# Patient Record
Sex: MALE | Race: BLACK OR AFRICAN AMERICAN | NOT HISPANIC OR LATINO | Employment: OTHER | URBAN - METROPOLITAN AREA
[De-identification: names, ages, dates, MRNs, and addresses within clinical notes are randomized per-mention and may not be internally consistent; named-entity substitution may affect disease eponyms.]

---

## 2023-09-28 ENCOUNTER — TELEPHONE (OUTPATIENT)
Dept: FAMILY MEDICINE CLINIC | Facility: CLINIC | Age: 83
End: 2023-09-28

## 2023-09-28 ENCOUNTER — IN HOME VISIT (OUTPATIENT)
Dept: FAMILY MEDICINE CLINIC | Facility: CLINIC | Age: 83
End: 2023-09-28
Payer: MEDICARE

## 2023-09-28 VITALS
RESPIRATION RATE: 16 BRPM | SYSTOLIC BLOOD PRESSURE: 120 MMHG | DIASTOLIC BLOOD PRESSURE: 60 MMHG | TEMPERATURE: 98.2 F | HEART RATE: 74 BPM | OXYGEN SATURATION: 96 %

## 2023-09-28 DIAGNOSIS — H40.9 GLAUCOMA OF BOTH EYES, UNSPECIFIED GLAUCOMA TYPE: ICD-10-CM

## 2023-09-28 DIAGNOSIS — E11.9 TYPE 2 DIABETES MELLITUS WITHOUT COMPLICATION, WITHOUT LONG-TERM CURRENT USE OF INSULIN (HCC): ICD-10-CM

## 2023-09-28 DIAGNOSIS — K21.9 GASTROESOPHAGEAL REFLUX DISEASE, UNSPECIFIED WHETHER ESOPHAGITIS PRESENT: ICD-10-CM

## 2023-09-28 DIAGNOSIS — J44.9 CHRONIC OBSTRUCTIVE PULMONARY DISEASE, UNSPECIFIED COPD TYPE (HCC): ICD-10-CM

## 2023-09-28 DIAGNOSIS — G47.00 INSOMNIA, UNSPECIFIED TYPE: ICD-10-CM

## 2023-09-28 DIAGNOSIS — K59.00 CONSTIPATION, UNSPECIFIED CONSTIPATION TYPE: ICD-10-CM

## 2023-09-28 DIAGNOSIS — G20.A1 PD (PARKINSON'S DISEASE): ICD-10-CM

## 2023-09-28 DIAGNOSIS — Z00.01 ENCOUNTER FOR GENERAL ADULT MEDICAL EXAMINATION WITH ABNORMAL FINDINGS: Primary | ICD-10-CM

## 2023-09-28 DIAGNOSIS — B36.9 FUNGAL RASH OF TORSO: ICD-10-CM

## 2023-09-28 DIAGNOSIS — F03.C0 SEVERE DEMENTIA, UNSPECIFIED DEMENTIA TYPE, UNSPECIFIED WHETHER BEHAVIORAL, PSYCHOTIC, OR MOOD DISTURBANCE OR ANXIETY (HCC): ICD-10-CM

## 2023-09-28 DIAGNOSIS — N40.0 BENIGN PROSTATIC HYPERPLASIA, UNSPECIFIED WHETHER LOWER URINARY TRACT SYMPTOMS PRESENT: ICD-10-CM

## 2023-09-28 DIAGNOSIS — L89.219 PRESSURE INJURY OF SKIN OF RIGHT HIP, UNSPECIFIED INJURY STAGE: ICD-10-CM

## 2023-09-28 DIAGNOSIS — Z85.46 HISTORY OF PROSTATE CANCER: ICD-10-CM

## 2023-09-28 DIAGNOSIS — H04.123 DRY EYES: ICD-10-CM

## 2023-09-28 DIAGNOSIS — I10 HYPERTENSION, UNSPECIFIED TYPE: ICD-10-CM

## 2023-09-28 DIAGNOSIS — N39.0 URINARY TRACT INFECTION ASSOCIATED WITH CATHETERIZATION OF URINARY TRACT, UNSPECIFIED INDWELLING URINARY CATHETER TYPE, INITIAL ENCOUNTER: ICD-10-CM

## 2023-09-28 DIAGNOSIS — I82.622 ARM DVT (DEEP VENOUS THROMBOEMBOLISM), ACUTE, LEFT (HCC): ICD-10-CM

## 2023-09-28 DIAGNOSIS — Z90.12 H/O LEFT MASTECTOMY: ICD-10-CM

## 2023-09-28 DIAGNOSIS — L98.429 SKIN ULCER OF SACRUM, UNSPECIFIED ULCER STAGE (HCC): ICD-10-CM

## 2023-09-28 DIAGNOSIS — Z74.01 BEDBOUND: ICD-10-CM

## 2023-09-28 DIAGNOSIS — T83.511A URINARY TRACT INFECTION ASSOCIATED WITH CATHETERIZATION OF URINARY TRACT, UNSPECIFIED INDWELLING URINARY CATHETER TYPE, INITIAL ENCOUNTER: ICD-10-CM

## 2023-09-28 DIAGNOSIS — R13.10 DYSPHAGIA, UNSPECIFIED TYPE: ICD-10-CM

## 2023-09-28 DIAGNOSIS — Z93.1 GASTROSTOMY TUBE DEPENDENT (HCC): ICD-10-CM

## 2023-09-28 DIAGNOSIS — Z97.8 INDWELLING FOLEY CATHETER PRESENT: ICD-10-CM

## 2023-09-28 DIAGNOSIS — N18.9 CHRONIC KIDNEY DISEASE, UNSPECIFIED CKD STAGE: ICD-10-CM

## 2023-09-28 DIAGNOSIS — F32.A DEPRESSION, UNSPECIFIED DEPRESSION TYPE: ICD-10-CM

## 2023-09-28 PROCEDURE — G0438 PPPS, INITIAL VISIT: HCPCS | Performed by: NURSE PRACTITIONER

## 2023-09-28 RX ORDER — ASCORBIC ACID 250 MG
250 TABLET ORAL DAILY
Qty: 30 TABLET | Refills: 3 | Status: ON HOLD
Start: 2023-09-28 | End: 2023-10-28

## 2023-09-28 RX ORDER — AMLODIPINE BESYLATE 10 MG/1
10 TABLET ORAL DAILY
Qty: 30 TABLET | Refills: 3 | Status: ON HOLD
Start: 2023-09-28 | End: 2023-10-28

## 2023-09-28 RX ORDER — NYSTATIN 100000 [USP'U]/G
POWDER TOPICAL 3 TIMES DAILY
Qty: 60 G | Refills: 3 | Status: ON HOLD
Start: 2023-09-28 | End: 2023-10-28

## 2023-09-28 RX ORDER — POLYVINYL ALCOHOL 14 MG/ML
1 SOLUTION/ DROPS OPHTHALMIC AS NEEDED
Qty: 30 ML | Refills: 3 | Status: ON HOLD
Start: 2023-09-28 | End: 2023-10-28

## 2023-09-28 RX ORDER — TAMSULOSIN HYDROCHLORIDE 0.4 MG/1
0.4 CAPSULE ORAL
Qty: 30 CAPSULE | Refills: 3 | Status: ON HOLD
Start: 2023-09-28 | End: 2023-10-28

## 2023-09-28 RX ORDER — FOLIC ACID 1 MG/1
1 TABLET ORAL DAILY
Qty: 30 TABLET | Refills: 3 | Status: ON HOLD
Start: 2023-09-28 | End: 2024-01-26

## 2023-09-28 RX ORDER — FAMOTIDINE 20 MG/1
20 TABLET, FILM COATED ORAL 2 TIMES DAILY
Qty: 60 TABLET | Refills: 3 | Status: ON HOLD
Start: 2023-09-28 | End: 2023-10-28

## 2023-09-28 RX ORDER — IPRATROPIUM BROMIDE AND ALBUTEROL SULFATE 2.5; .5 MG/3ML; MG/3ML
3 SOLUTION RESPIRATORY (INHALATION) 4 TIMES DAILY
Qty: 360 ML | Refills: 3 | Status: ON HOLD
Start: 2023-09-28 | End: 2023-10-28

## 2023-09-28 RX ORDER — LAMOTRIGINE 25 MG/1
25 TABLET ORAL 2 TIMES DAILY
Qty: 60 TABLET | Refills: 3 | Status: ON HOLD
Start: 2023-09-28 | End: 2023-10-28

## 2023-09-28 RX ORDER — SERTRALINE HYDROCHLORIDE 100 MG/1
100 TABLET, FILM COATED ORAL DAILY
Qty: 30 TABLET | Refills: 5 | Status: ON HOLD
Start: 2023-09-28

## 2023-09-28 RX ORDER — BUDESONIDE 0.25 MG/2ML
0.25 INHALANT ORAL 2 TIMES DAILY
Qty: 120 ML | Refills: 3 | Status: ON HOLD
Start: 2023-09-28 | End: 2023-10-28

## 2023-09-28 RX ORDER — CARBIDOPA AND LEVODOPA 25; 100 MG/1; MG/1
1 TABLET, ORALLY DISINTEGRATING ORAL 3 TIMES DAILY
Qty: 90 TABLET | Refills: 3 | Status: ON HOLD
Start: 2023-09-28 | End: 2023-10-28

## 2023-09-28 RX ORDER — LATANOPROST 50 UG/ML
1 SOLUTION/ DROPS OPHTHALMIC
Qty: 1.5 ML | Refills: 3 | Status: ON HOLD
Start: 2023-09-28 | End: 2023-10-28

## 2023-09-28 NOTE — TELEPHONE ENCOUNTER
vm on clinical line:     Kurtis my name is [place]. I am calling from the [facility] of [address] regarding a referral for Georgie Lover YOB: 1940. I am calling to confirm if Tuesday Remembers will be signing for his home care orders. If you could please give me a call back, my number is [phone]. Thank you. Called back & confirmed seen today. Will sign for home orders.

## 2023-09-28 NOTE — PROGRESS NOTES
Assessment and Plan:     Problem List Items Addressed This Visit        Digestive    Dysphagia    Gastroesophageal reflux disease    Relevant Medications    famotidine (PEPCID) 20 mg tablet       Endocrine    Type 2 diabetes mellitus without complication  , without long-term current use of insulin ([facility])  Will order blood testing supplies for daughter       Respiratory    Chronic obstructive pulmonary disease ([facility])    Relevant Medications    budesonide (Pulmicort) 0.25 mg/2 mL nebulizer solution    ipratropium-albuterol (DUO-NEB) 0.5-2.5 mg/3 mL nebulizer solution       Cardiovascular and Mediastinum    Hypertension    Relevant Medications    amLODIPine (NORVASC) 10 mg tablet    Arm DVT (deep venous thromboembolism), acute, left ([facility])  No blood thinners ordered for patient on arrival from [place]-report from daughter  No swelling of limb       Nervous and Auditory    PD (Parkinson's disease)    Relevant Medications    carbidopa-levodopa (PARCOPA)  mg per disintegrating tablet    lamoTRIgine (LaMICtal) 25 mg tabletl    Severe dementia ([facility])    Relevant Medications    lamoTRIgine (LaMICtal) 25 mg tablet    sertraline (ZOLOFT) 100 mg tablet       Musculoskeletal and Integument    Skin ulcer of sacrum ([facility])    Relevant Medications    ascorbic acid (VITAMIN C) 564 MG tablet    folic acid ( Folic Acid) 1 mg tablet    nystatin (MYCOSTATIN) powder    Fungal rash of torso    Relevant Medications    nystatin (MYCOSTATIN) powder       Genitourinary    Catheter-associated urinary tract infection     Chronic kidney disease    Benign prostatic hyperplasia    Relevant Medications    tamsulosin (FLOMAX) 0.4 mg       Other    Encounter for general adult medical examination with abnormal findings - Primary    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[address])    Bedbound    Pressure injury of skin of right hip    H/O left mastectomy    History of prostate cancer    Indwelling Lieberman catheter present    Constipation    Relevant Medications docusate (COLACE) 50 mg/5 mL liquid    magnesium hydroxide (MILK OF MAGNESIA) 400 mg/5 mL oral suspension    Glaucoma of both eyes    Relevant Medications    latanoprost (XALATAN) 0.005 % ophthalmic solution    polyvinyl alcohol (LIQUIFILM TEARS) 1.4 % ophthalmic solution    Insomnia    Relevant Medications    Melatonin 5 MG/ML LIQD    Dry eyes    Relevant Medications    polyvinyl alcohol (LIQUIFILM TEARS) 1.4 % ophthalmic solution    Depression    Relevant Medications    sertraline (ZOLOFT) 100 mg tablet     BMI Counseling: There is no height or weight on file to calculate BMI. Follow-up plan was not completed due to elderly patient (72 years old) where weight reduction/weight gain would complicate underlying health condition such as: mental illness, dementia, or confusion. Bed bound    Depression Screening and Follow-up Plan: Patient not screened for depression due to medical reason (urgent/emergent situation, decreased capacity). Tobacco Cessation Counseling: Tobacco use screening or tobacco cessation counseling was not performed due to other medical reasons. Patient has dementia      Preventive health issues were discussed with patient, and age appropriate screening tests were ordered as noted in patient's After Visit Summary. Personalized health advice and appropriate referrals for health education or preventive services given if needed, as noted in patient's After Visit Summary. History of Present Illness:     Patient presents for a Medicare Wellness Visit    Patient seen today at his daughter's house. Arrived last night from [facility] where he had been living for over 8 years. He was in a hospital in [facility] and several nursing homes. He currently has a UTI. He has a vazquez catheter and gastrostomy tube in which he receives meds, food and fluids. He had a left masectomy in July with no further follow up. He has a history of prostate Ca.   Per his daughter he has a left arm DVT but reacted poorly to blood thinners. There is no swelling of his left arm. He is a DNR, DNI. He has dx of UTI, PD, HTN, dementia, CKD, DM, dysphagia, and bed bound. He has right hip and sacral wounds. > 75 minutes was spent with Nilsa Holden on detailed history-reviewing all paperwork his daughter had and talking to his daughter-physical exam and assessment, planning and diagnosing and education for his daughter on tube feeding. Patient Care Team:  Tuesday ROSITA Anderson as PCP - General (Nurse Practitioner)     Review of Systems:     Review of Systems   Unable to perform ROS: Dementia      Problem List:     Patient Active Problem List   Diagnosis   • Type 2 diabetes mellitus without complication, without long-term current use of insulin ([address])   • Encounter for general adult medical examination with abnormal findings   • Catheter-associated urinary tract infection    • PD (Parkinson's disease)   • Hypertension   • Chronic kidney disease   • Severe dementia ([address])   • Dysphagia   • 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[address])   • Bedbound   • Pressure injury of skin of right hip   • Skin ulcer of sacrum ([address])   • H/O left mastectomy   • History of prostate cancer   • Arm DVT (deep venous thromboembolism), acute, left ([facility])   • Indwelling Lieberman catheter present   • Chronic obstructive pulmonary disease ([facility])   • Constipation   • Gastroesophageal reflux disease   • Glaucoma of both eyes   • Insomnia   • Fungal rash of torso   • Dry eyes   • Depression   • Benign prostatic hyperplasia      Past Medical and Surgical History:     No past medical history on file. No past surgical history on file. Family History:     No family history on file.    Social History:     Social History     Socioeconomic History   • Marital status:      Spouse name: Not on file   • Number of children: Not on file   • Years of education: Not on file   • Highest education level: Not on file   Occupational History   • Not on file   Tobacco Use   • Smoking status: Not on file   • Smokeless tobacco: Not on file   Substance and Sexual Activity   • Alcohol use: Not on file   • Drug use: Not on file   • Sexual activity: Not on file   Other Topics Concern   • Not on file   Social History Narrative   • Not on file     Social Determinants of Health     Financial Resource Strain: Unknown (9/28/2023)    Overall Financial Resource Strain (CARDIA)    • Difficulty of Paying Living Expenses: Patient refused   Food Insecurity: Not on file   Transportation Needs: Not on file   Physical Activity: Not on file   Stress: Not on file   Social Connections: Not on file   Intimate Partner Violence: Not on file   Housing Stability: Not on file      Medications and Allergies:     Current Outpatient Medications   Medication Sig Dispense Refill   • amLODIPine (NORVASC) 10 mg tablet Take 1 tablet (10 mg total) by mouth daily 30 tablet 3   • ascorbic acid (VITAMIN C) 250 MG tablet Take 1 tablet (250 mg total) by mouth daily 30 tablet 3   • budesonide (Pulmicort) 0.25 mg/2 mL nebulizer solution Take 2 mL (0.25 mg total) by nebulization 2 (two) times a day Rinse mouth after use.  120 mL 3   • carbidopa-levodopa (PARCOPA)  mg per disintegrating tablet Take 1 tablet by mouth 3 (three) times a day 90 tablet 3   • docusate (COLACE) 50 mg/5 mL liquid Take 10 mL (100 mg total) by mouth daily 300 mL 3   • famotidine (PEPCID) 20 mg tablet Take 1 tablet (20 mg total) by mouth 2 (two) times a day 60 tablet 3   • folic acid (KP Folic Acid) 1 mg tablet Take 1 tablet (1 mg total) by mouth daily 30 tablet 3   • ipratropium-albuterol (DUO-NEB) 0.5-2.5 mg/3 mL nebulizer solution Take 3 mL by nebulization 4 (four) times a day 360 mL 3   • lamoTRIgine (LaMICtal) 25 mg tablet Take 1 tablet (25 mg total) by mouth 2 (two) times a day 60 tablet 3   • latanoprost (XALATAN) 0.005 % ophthalmic solution Administer 1 drop to both eyes daily at bedtime 1.5 mL 3   • magnesium hydroxide (MILK OF MAGNESIA) 400 mg/5 mL oral suspension Take 30 mL by mouth daily as needed (every 3rd day) 300 mL 3   • Melatonin 5 MG/ML LIQD Take 2 mL (10 mg total) by mouth daily at bedtime 60 mL 3   • nystatin (MYCOSTATIN) powder Apply topically 3 (three) times a day 60 g 3   • polyvinyl alcohol (LIQUIFILM TEARS) 1.4 % ophthalmic solution Administer 1 drop to both eyes as needed for dry eyes 30 mL 3   • sertraline (ZOLOFT) 100 mg tablet Take 1 tablet (100 mg total) by mouth daily 30 tablet 5   • tamsulosin (FLOMAX) 0.4 mg Take 1 capsule (0.4 mg total) by mouth daily with dinner 30 capsule 3     No current facility-administered medications for this visit. Not on File   Immunizations: There is no immunization history on file for this patient. Health Maintenance: There are no preventive care reminders to display for this patient. Topic Date Due   • Influenza Vaccine (1) 09/01/2023      Medicare Screening Tests and Risk Assessments:     Castillo Rodriguez is here for his Initial Wellness visit. Historian  Patient cannot answer questions due to cognitive impairment, intelluctual disability, or expressive limitations. Information provided by: family and caregiver. Health Risk Assessment:   Patient rates overall health as fair. Patient feels that their physical health rating is slightly worse. Pain experienced in the last 7 days has been none. Patient states that he has experienced weight loss or gain in last 6 months. Fall Risk Screening:    In the past year, patient has experienced: no history of falling in past year      Home Safety:  Bed bound    Nutrition:   Tube feeding-Diabetisource 1.2 250ml 2 containers 3 times a day    Medications:   Medications via peg    Activities of Daily Living (ADLs)/Instrumental Activities of Daily Living (IADLs):       ADL comments: Bed bound and complete care    Previous Hospitalizations:   Any hospitalizations or ED visits within the last 12 months?: Yes      Advance Care Planning:   Living will: Yes    Advanced directive: Yes    Provider agrees with end of life decisions: Yes      Comments: Daughter Pao CID  Patient DNR/DNI    Cognitive Screening:   Provider or family/friend/caregiver concerned regarding cognition?: No    PREVENTIVE SCREENINGS      Cardiovascular Screening:    General: Patient Declines      Diabetes Screening:       Due for: Blood Glucose      Colorectal Cancer Screening:     General: Patient Declines      Prostate Cancer Screening:    General: Screening Not Indicated      Osteoporosis Screening:    General: Patient Declines      Abdominal Aortic Aneurysm (AAA) Screening:        General: Patient Declines      Lung Cancer Screening:     General: Screening Not Indicated      Hepatitis C Screening:      Hep C Screening Accepted: Yes      Screening, Brief Intervention, and Referral to Treatment (SBIRT)    Screening  Typical number of drinks in a day: 0  Typical number of drinks in a week: 0  Interpretation: Low risk drinking behavior. No results found. Physical Exam:     /60   Pulse 74   Temp 98.2 °F (36.8 °C)   Resp 16   SpO2 96%     Physical Exam  Constitutional:       General: He is not in acute distress. Appearance: He is normal weight. He is not ill-appearing, toxic-appearing or diaphoretic. HENT:      Head: Normocephalic and atraumatic. Right Ear: External ear normal.      Left Ear: External ear normal.      Nose: Nose normal. No congestion. Mouth/Throat:      Mouth: Mucous membranes are moist.   Eyes:      General:         Right eye: No discharge. Left eye: No discharge. Conjunctiva/sclera: Conjunctivae normal.   Cardiovascular:      Rate and Rhythm: Normal rate and regular rhythm. Pulses: Normal pulses. Heart sounds: Normal heart sounds. Pulmonary:      Effort: Pulmonary effort is normal. No respiratory distress. Breath sounds: Normal breath sounds. No wheezing, rhonchi or rales. Abdominal:      General: Abdomen is flat.  Bowel sounds are normal. Palpations: Abdomen is soft. Tenderness: There is no abdominal tenderness. There is no guarding. Comments: Gastrostomy tube left abdomen   Genitourinary:     Penis: Normal.       Comments: Indwelling vazquez catheter  Musculoskeletal:      Cervical back: Normal range of motion. No rigidity. Right lower leg: No edema. Left lower leg: No edema. Comments: Stiffness of limbs and joints   Skin:     General: Skin is warm and dry. Findings: Lesion present. No bruising or rash. Comments: Wounds of right hip and sacrum   Neurological:      Mental Status: He is alert. He is disoriented.       Coordination: Coordination abnormal.      Gait: Gait abnormal.      Comments: dementia   Psychiatric:      Comments: dementia          ROSITA Monae

## 2023-09-29 ENCOUNTER — PATIENT OUTREACH (OUTPATIENT)
Dept: FAMILY MEDICINE CLINIC | Facility: CLINIC | Age: 83
End: 2023-09-29

## 2023-09-29 ENCOUNTER — HOSPITAL ENCOUNTER (INPATIENT)
Facility: HOSPITAL | Age: 83
LOS: 10 days | Discharge: HOME HEALTH | DRG: 668 | End: 2023-10-09
Attending: EMERGENCY MEDICINE | Admitting: INTERNAL MEDICINE
Payer: MEDICARE

## 2023-09-29 ENCOUNTER — TELEPHONE (OUTPATIENT)
Dept: FAMILY MEDICINE CLINIC | Facility: CLINIC | Age: 83
End: 2023-09-29

## 2023-09-29 ENCOUNTER — APPOINTMENT (EMERGENCY)
Dept: RADIOLOGY | Facility: HOSPITAL | Age: 83
DRG: 668 | End: 2023-09-29
Payer: MEDICARE

## 2023-09-29 DIAGNOSIS — K21.9 GASTROESOPHAGEAL REFLUX DISEASE, UNSPECIFIED WHETHER ESOPHAGITIS PRESENT: ICD-10-CM

## 2023-09-29 DIAGNOSIS — G20.A1 PARKINSON'S DISEASE WITHOUT DYSKINESIA, UNSPECIFIED WHETHER MANIFESTATIONS FLUCTUATE: ICD-10-CM

## 2023-09-29 DIAGNOSIS — N40.0 BENIGN PROSTATIC HYPERPLASIA, UNSPECIFIED WHETHER LOWER URINARY TRACT SYMPTOMS PRESENT: ICD-10-CM

## 2023-09-29 DIAGNOSIS — R13.10 DYSPHAGIA, UNSPECIFIED TYPE: ICD-10-CM

## 2023-09-29 DIAGNOSIS — R31.9 HEMATURIA: Primary | ICD-10-CM

## 2023-09-29 DIAGNOSIS — I10 HYPERTENSION, UNSPECIFIED TYPE: ICD-10-CM

## 2023-09-29 DIAGNOSIS — R31.9 HEMATURIA SYNDROME: ICD-10-CM

## 2023-09-29 DIAGNOSIS — R31.0 GROSS HEMATURIA: ICD-10-CM

## 2023-09-29 DIAGNOSIS — K59.00 CONSTIPATION, UNSPECIFIED CONSTIPATION TYPE: ICD-10-CM

## 2023-09-29 DIAGNOSIS — F32.A DEPRESSION, UNSPECIFIED DEPRESSION TYPE: ICD-10-CM

## 2023-09-29 DIAGNOSIS — Z93.1 GASTROSTOMY TUBE DEPENDENT (HCC): ICD-10-CM

## 2023-09-29 DIAGNOSIS — J44.9 CHRONIC OBSTRUCTIVE PULMONARY DISEASE, UNSPECIFIED COPD TYPE (HCC): ICD-10-CM

## 2023-09-29 DIAGNOSIS — D64.9 ANEMIA, UNSPECIFIED TYPE: ICD-10-CM

## 2023-09-29 DIAGNOSIS — G47.00 INSOMNIA, UNSPECIFIED TYPE: ICD-10-CM

## 2023-09-29 DIAGNOSIS — C67.2 CARCINOMA OF LATERAL WALL OF URINARY BLADDER (HCC): ICD-10-CM

## 2023-09-29 DIAGNOSIS — N18.31 STAGE 3A CHRONIC KIDNEY DISEASE (HCC): ICD-10-CM

## 2023-09-29 LAB
ALBUMIN SERPL BCP-MCNC: 3 G/DL (ref 3.5–5)
ALP SERPL-CCNC: 46 U/L (ref 34–104)
ALT SERPL W P-5'-P-CCNC: 10 U/L (ref 7–52)
ANION GAP SERPL CALCULATED.3IONS-SCNC: 7 MMOL/L
APTT PPP: 35 SECONDS (ref 23–37)
AST SERPL W P-5'-P-CCNC: 21 U/L (ref 13–39)
BACTERIA UR QL AUTO: ABNORMAL /HPF
BACTERIA UR QL AUTO: ABNORMAL /HPF
BASOPHILS # BLD AUTO: 0.08 THOUSANDS/ÂΜL (ref 0–0.1)
BASOPHILS NFR BLD AUTO: 1 % (ref 0–1)
BILIRUB SERPL-MCNC: 0.41 MG/DL (ref 0.2–1)
BILIRUB UR QL STRIP: ABNORMAL
BILIRUB UR QL STRIP: ABNORMAL
BUN SERPL-MCNC: 58 MG/DL (ref 5–25)
CALCIUM ALBUM COR SERPL-MCNC: 10 MG/DL (ref 8.3–10.1)
CALCIUM SERPL-MCNC: 9.2 MG/DL (ref 8.4–10.2)
CHLORIDE SERPL-SCNC: 102 MMOL/L (ref 96–108)
CLARITY UR: ABNORMAL
CLARITY UR: ABNORMAL
CO2 SERPL-SCNC: 26 MMOL/L (ref 21–32)
COLOR UR: ABNORMAL
COLOR UR: ABNORMAL
CREAT SERPL-MCNC: 2.35 MG/DL (ref 0.6–1.3)
EOSINOPHIL # BLD AUTO: 0.36 THOUSAND/ÂΜL (ref 0–0.61)
EOSINOPHIL NFR BLD AUTO: 4 % (ref 0–6)
ERYTHROCYTE [DISTWIDTH] IN BLOOD BY AUTOMATED COUNT: 16.4 % (ref 11.6–15.1)
GFR SERPL CREATININE-BSD FRML MDRD: 24 ML/MIN/1.73SQ M
GLUCOSE SERPL-MCNC: 132 MG/DL (ref 65–140)
GLUCOSE SERPL-MCNC: 134 MG/DL (ref 65–140)
GLUCOSE SERPL-MCNC: 169 MG/DL (ref 65–140)
GLUCOSE UR STRIP-MCNC: ABNORMAL MG/DL
GLUCOSE UR STRIP-MCNC: NEGATIVE MG/DL
HCT VFR BLD AUTO: 32.7 % (ref 36.5–49.3)
HGB BLD-MCNC: 10.4 G/DL (ref 12–17)
HGB UR QL STRIP.AUTO: ABNORMAL
HGB UR QL STRIP.AUTO: ABNORMAL
IMM GRANULOCYTES # BLD AUTO: 0.02 THOUSAND/UL (ref 0–0.2)
IMM GRANULOCYTES NFR BLD AUTO: 0 % (ref 0–2)
INR PPP: 1.13 (ref 0.84–1.19)
KETONES UR STRIP-MCNC: ABNORMAL MG/DL
KETONES UR STRIP-MCNC: ABNORMAL MG/DL
LEUKOCYTE ESTERASE UR QL STRIP: ABNORMAL
LEUKOCYTE ESTERASE UR QL STRIP: ABNORMAL
LYMPHOCYTES # BLD AUTO: 1.7 THOUSANDS/ÂΜL (ref 0.6–4.47)
LYMPHOCYTES NFR BLD AUTO: 19 % (ref 14–44)
MAGNESIUM SERPL-MCNC: 2 MG/DL (ref 1.9–2.7)
MCH RBC QN AUTO: 27.7 PG (ref 26.8–34.3)
MCHC RBC AUTO-ENTMCNC: 31.8 G/DL (ref 31.4–37.4)
MCV RBC AUTO: 87 FL (ref 82–98)
MONOCYTES # BLD AUTO: 0.72 THOUSAND/ÂΜL (ref 0.17–1.22)
MONOCYTES NFR BLD AUTO: 8 % (ref 4–12)
NEUTROPHILS # BLD AUTO: 6.19 THOUSANDS/ÂΜL (ref 1.85–7.62)
NEUTS SEG NFR BLD AUTO: 68 % (ref 43–75)
NITRITE UR QL STRIP: ABNORMAL
NITRITE UR QL STRIP: ABNORMAL
NON-SQ EPI CELLS URNS QL MICRO: ABNORMAL /HPF
NON-SQ EPI CELLS URNS QL MICRO: ABNORMAL /HPF
NRBC BLD AUTO-RTO: 0 /100 WBCS
PH UR STRIP.AUTO: 6.5 [PH]
PH UR STRIP.AUTO: ABNORMAL [PH]
PLATELET # BLD AUTO: 239 THOUSANDS/UL (ref 149–390)
PMV BLD AUTO: 9 FL (ref 8.9–12.7)
POTASSIUM SERPL-SCNC: 4.6 MMOL/L (ref 3.5–5.3)
PROT SERPL-MCNC: 7.3 G/DL (ref 6.4–8.4)
PROT UR STRIP-MCNC: ABNORMAL MG/DL
PROT UR STRIP-MCNC: ABNORMAL MG/DL
PROTHROMBIN TIME: 14.7 SECONDS (ref 11.6–14.5)
RBC # BLD AUTO: 3.75 MILLION/UL (ref 3.88–5.62)
RBC #/AREA URNS AUTO: ABNORMAL /HPF
RBC #/AREA URNS AUTO: ABNORMAL /HPF
SODIUM SERPL-SCNC: 135 MMOL/L (ref 135–147)
SP GR UR STRIP.AUTO: 1.01 (ref 1–1.03)
SP GR UR STRIP.AUTO: 1.01 (ref 1–1.03)
UROBILINOGEN UR QL STRIP.AUTO: 0.2 E.U./DL
UROBILINOGEN UR QL STRIP.AUTO: ABNORMAL E.U./DL
WBC # BLD AUTO: 9.07 THOUSAND/UL (ref 4.31–10.16)
WBC #/AREA URNS AUTO: ABNORMAL /HPF
WBC #/AREA URNS AUTO: ABNORMAL /HPF

## 2023-09-29 PROCEDURE — 87077 CULTURE AEROBIC IDENTIFY: CPT | Performed by: EMERGENCY MEDICINE

## 2023-09-29 PROCEDURE — 85730 THROMBOPLASTIN TIME PARTIAL: CPT | Performed by: EMERGENCY MEDICINE

## 2023-09-29 PROCEDURE — 94640 AIRWAY INHALATION TREATMENT: CPT

## 2023-09-29 PROCEDURE — 87186 SC STD MICRODIL/AGAR DIL: CPT | Performed by: EMERGENCY MEDICINE

## 2023-09-29 PROCEDURE — 94760 N-INVAS EAR/PLS OXIMETRY 1: CPT

## 2023-09-29 PROCEDURE — 83036 HEMOGLOBIN GLYCOSYLATED A1C: CPT

## 2023-09-29 PROCEDURE — 87086 URINE CULTURE/COLONY COUNT: CPT | Performed by: EMERGENCY MEDICINE

## 2023-09-29 PROCEDURE — 85025 COMPLETE CBC W/AUTO DIFF WBC: CPT | Performed by: EMERGENCY MEDICINE

## 2023-09-29 PROCEDURE — 99285 EMERGENCY DEPT VISIT HI MDM: CPT

## 2023-09-29 PROCEDURE — 83735 ASSAY OF MAGNESIUM: CPT | Performed by: EMERGENCY MEDICINE

## 2023-09-29 PROCEDURE — 85610 PROTHROMBIN TIME: CPT | Performed by: EMERGENCY MEDICINE

## 2023-09-29 PROCEDURE — 81001 URINALYSIS AUTO W/SCOPE: CPT | Performed by: EMERGENCY MEDICINE

## 2023-09-29 PROCEDURE — 82948 REAGENT STRIP/BLOOD GLUCOSE: CPT

## 2023-09-29 PROCEDURE — 80053 COMPREHEN METABOLIC PANEL: CPT | Performed by: EMERGENCY MEDICINE

## 2023-09-29 PROCEDURE — 36415 COLL VENOUS BLD VENIPUNCTURE: CPT | Performed by: EMERGENCY MEDICINE

## 2023-09-29 PROCEDURE — 99285 EMERGENCY DEPT VISIT HI MDM: CPT | Performed by: EMERGENCY MEDICINE

## 2023-09-29 PROCEDURE — 99223 1ST HOSP IP/OBS HIGH 75: CPT | Performed by: INTERNAL MEDICINE

## 2023-09-29 PROCEDURE — G1004 CDSM NDSC: HCPCS

## 2023-09-29 PROCEDURE — 74176 CT ABD & PELVIS W/O CONTRAST: CPT

## 2023-09-29 RX ORDER — SERTRALINE HYDROCHLORIDE 100 MG/1
100 TABLET, FILM COATED ORAL DAILY
Status: DISCONTINUED | OUTPATIENT
Start: 2023-09-30 | End: 2023-10-09 | Stop reason: HOSPADM

## 2023-09-29 RX ORDER — FAMOTIDINE 20 MG/1
20 TABLET, FILM COATED ORAL EVERY OTHER DAY
Status: DISCONTINUED | OUTPATIENT
Start: 2023-09-30 | End: 2023-10-09 | Stop reason: HOSPADM

## 2023-09-29 RX ORDER — LANOLIN ALCOHOL/MO/W.PET/CERES
6 CREAM (GRAM) TOPICAL
Status: DISCONTINUED | OUTPATIENT
Start: 2023-09-29 | End: 2023-10-09 | Stop reason: HOSPADM

## 2023-09-29 RX ORDER — SERTRALINE HYDROCHLORIDE 100 MG/1
100 TABLET, FILM COATED ORAL DAILY
Status: DISCONTINUED | OUTPATIENT
Start: 2023-09-30 | End: 2023-09-29

## 2023-09-29 RX ORDER — IPRATROPIUM BROMIDE AND ALBUTEROL SULFATE 2.5; .5 MG/3ML; MG/3ML
3 SOLUTION RESPIRATORY (INHALATION) 4 TIMES DAILY
Status: DISCONTINUED | OUTPATIENT
Start: 2023-09-29 | End: 2023-09-30

## 2023-09-29 RX ORDER — MULTIVIT WITH MINERALS/LUTEIN
250 TABLET ORAL DAILY
Status: DISCONTINUED | OUTPATIENT
Start: 2023-09-30 | End: 2023-09-29

## 2023-09-29 RX ORDER — NYSTATIN 100000 [USP'U]/G
POWDER TOPICAL 3 TIMES DAILY
Status: DISCONTINUED | OUTPATIENT
Start: 2023-09-29 | End: 2023-10-09 | Stop reason: HOSPADM

## 2023-09-29 RX ORDER — LATANOPROST 50 UG/ML
1 SOLUTION/ DROPS OPHTHALMIC
Status: DISCONTINUED | OUTPATIENT
Start: 2023-09-29 | End: 2023-10-09 | Stop reason: HOSPADM

## 2023-09-29 RX ORDER — LABETALOL 100 MG/1
200 TABLET, FILM COATED ORAL EVERY 8 HOURS SCHEDULED
Status: DISCONTINUED | OUTPATIENT
Start: 2023-09-29 | End: 2023-09-29

## 2023-09-29 RX ORDER — FOLIC ACID 1 MG/1
1 TABLET ORAL DAILY
Status: DISCONTINUED | OUTPATIENT
Start: 2023-09-30 | End: 2023-10-09 | Stop reason: HOSPADM

## 2023-09-29 RX ORDER — TAMSULOSIN HYDROCHLORIDE 0.4 MG/1
0.4 CAPSULE ORAL
Status: DISCONTINUED | OUTPATIENT
Start: 2023-09-30 | End: 2023-09-30

## 2023-09-29 RX ORDER — AMLODIPINE BESYLATE 10 MG/1
10 TABLET ORAL DAILY
Status: DISCONTINUED | OUTPATIENT
Start: 2023-09-30 | End: 2023-09-29

## 2023-09-29 RX ORDER — LABETALOL 100 MG/1
200 TABLET, FILM COATED ORAL EVERY 8 HOURS SCHEDULED
Status: DISCONTINUED | OUTPATIENT
Start: 2023-09-30 | End: 2023-10-06

## 2023-09-29 RX ORDER — FOLIC ACID 1 MG/1
1 TABLET ORAL DAILY
Status: DISCONTINUED | OUTPATIENT
Start: 2023-09-30 | End: 2023-09-29

## 2023-09-29 RX ORDER — LAMOTRIGINE 25 MG/1
25 TABLET ORAL 2 TIMES DAILY
Status: DISCONTINUED | OUTPATIENT
Start: 2023-09-30 | End: 2023-10-09 | Stop reason: HOSPADM

## 2023-09-29 RX ORDER — BUDESONIDE 0.25 MG/2ML
0.25 INHALANT ORAL 2 TIMES DAILY
Status: DISCONTINUED | OUTPATIENT
Start: 2023-09-29 | End: 2023-10-09 | Stop reason: HOSPADM

## 2023-09-29 RX ORDER — FAMOTIDINE 20 MG/1
20 TABLET, FILM COATED ORAL EVERY OTHER DAY
Status: DISCONTINUED | OUTPATIENT
Start: 2023-09-30 | End: 2023-09-29

## 2023-09-29 RX ORDER — LAMOTRIGINE 25 MG/1
25 TABLET ORAL 2 TIMES DAILY
Status: DISCONTINUED | OUTPATIENT
Start: 2023-09-29 | End: 2023-09-29

## 2023-09-29 RX ORDER — MULTIVIT WITH MINERALS/LUTEIN
250 TABLET ORAL DAILY
Status: DISCONTINUED | OUTPATIENT
Start: 2023-09-30 | End: 2023-10-09 | Stop reason: HOSPADM

## 2023-09-29 RX ORDER — DOCUSATE SODIUM 100 MG/1
100 CAPSULE, LIQUID FILLED ORAL DAILY
Status: DISCONTINUED | OUTPATIENT
Start: 2023-09-30 | End: 2023-09-30

## 2023-09-29 RX ORDER — AMLODIPINE BESYLATE 10 MG/1
10 TABLET ORAL DAILY
Status: DISCONTINUED | OUTPATIENT
Start: 2023-09-30 | End: 2023-10-06

## 2023-09-29 RX ORDER — CEFTRIAXONE 1 G/50ML
1000 INJECTION, SOLUTION INTRAVENOUS ONCE
Status: COMPLETED | OUTPATIENT
Start: 2023-09-29 | End: 2023-09-29

## 2023-09-29 RX ORDER — CEFTRIAXONE 1 G/50ML
1000 INJECTION, SOLUTION INTRAVENOUS EVERY 24 HOURS
Status: DISCONTINUED | OUTPATIENT
Start: 2023-09-30 | End: 2023-10-01

## 2023-09-29 RX ORDER — MINERAL OIL AND PETROLATUM 150; 830 MG/G; MG/G
OINTMENT OPHTHALMIC EVERY 4 HOURS PRN
Status: DISCONTINUED | OUTPATIENT
Start: 2023-09-29 | End: 2023-10-09 | Stop reason: HOSPADM

## 2023-09-29 RX ADMIN — Medication 6 MG: at 23:08

## 2023-09-29 RX ADMIN — IPRATROPIUM BROMIDE AND ALBUTEROL SULFATE 3 ML: .5; 2.5 SOLUTION RESPIRATORY (INHALATION) at 22:45

## 2023-09-29 RX ADMIN — CEFTRIAXONE 1000 MG: 1 INJECTION, SOLUTION INTRAVENOUS at 20:29

## 2023-09-29 RX ADMIN — LATANOPROST 1 DROP: 50 SOLUTION OPHTHALMIC at 23:08

## 2023-09-29 RX ADMIN — BUDESONIDE 0.25 MG: 0.25 INHALANT ORAL at 22:48

## 2023-09-29 RX ADMIN — NYSTATIN: 100000 POWDER TOPICAL at 23:08

## 2023-09-29 NOTE — TELEPHONE ENCOUNTER
Tuesday    Could you please see if you can go to the house, Mr. Tarun Tucker has blood in the cathere the daughter Herrera Spencer  [phone] cell    Please asap. please thanks

## 2023-09-29 NOTE — TELEPHONE ENCOUNTER
Allyn vang    Tuesday will like me to forward this call. See if you can help her. Please    "Tuesday"  " Could you please see if you can go to the hose.  Mr. Samantha García has blood in the cathere the daughnorma Olguin called this morning an now."    [phone] cell

## 2023-09-29 NOTE — PROGRESS NOTES
Received in basket message to reach out to patients daughter. Spoke with Mele Bradford from clerical staff. Attempted outreach to patients daughter. Left detailed message for daughter requesting return call. RN CM contact number provided. Daughter returned RN CM phone call. Per daughter [facility] placed order for [facility]. [facility] ordered all DME. Daughter has been following up with [facility]. Pt had blood in his cathter. Dorita from [facility] came to do home visit. Dorita recommended that patient be sent to ED for evaluation. Pt was enroute to ED at time of call.

## 2023-09-29 NOTE — ED PROVIDER NOTES
History  Chief Complaint   Patient presents with   • Blood in Urine     Pt brought in by squad reportexdly from home. Per squad, pt was recently discharged from nursing home and has developed hematuria from vazquez catheter. Upon arrival, pt noted with dark red urine from catheter. Pt denies any pain at this time. 77-year-old male who lives at home presents with malfunction of the Vazquez catheter where he is draining blood. He has a chronic Vazquez for urine retention/UTIs. History of Parkinson's dementia mostly bedbound recently discharged from another hospital for evaluation of sacral ulcers. No reports of fevers chills abdominal pain nausea vomiting or any other symptoms. History provided by:  Patient and relative   used: No        Prior to Admission Medications   Prescriptions Last Dose Informant Patient Reported? Taking? Blood Glucose Monitoring Suppl Supplies MISC Unknown  No No   Sig: Use every morning Check FBG daily   Melatonin 5 MG/ML LIQD 9/28/2023 at 2100  No Yes   Sig: Take 2 mL (10 mg total) by mouth daily at bedtime   amLODIPine (NORVASC) 10 mg tablet 9/29/2023 at 0900  No Yes   Sig: Take 1 tablet (10 mg total) by mouth daily   ascorbic acid (VITAMIN C) 250 MG tablet More than a month  No No   Sig: Take 1 tablet (250 mg total) by mouth daily   Patient not taking: Reported on 9/29/2023   budesonide (Pulmicort) 0.25 mg/2 mL nebulizer solution More than a month  No No   Sig: Take 2 mL (0.25 mg total) by nebulization 2 (two) times a day Rinse mouth after use.    carbidopa-levodopa (PARCOPA)  mg per disintegrating tablet 9/29/2023 at 0900  No Yes   Sig: Take 1 tablet by mouth 3 (three) times a day   docusate (COLACE) 50 mg/5 mL liquid More than a month  No No   Sig: Take 10 mL (100 mg total) by mouth daily   famotidine (PEPCID) 20 mg tablet 9/29/2023 at 0900  No Yes   Sig: Take 1 tablet (20 mg total) by mouth 2 (two) times a day   folic acid (KP Folic Acid) 1 mg tablet 9/29/2023 at 0900  No Yes   Sig: Take 1 tablet (1 mg total) by mouth daily   ipratropium-albuterol (DUO-NEB) 0.5-2.5 mg/3 mL nebulizer solution More than a month  No No   Sig: Take 3 mL by nebulization 4 (four) times a day   lamoTRIgine (LaMICtal) 25 mg tablet 9/29/2023 at 0900  No Yes   Sig: Take 1 tablet (25 mg total) by mouth 2 (two) times a day   latanoprost (XALATAN) 0.005 % ophthalmic solution 9/29/2023 at 0900  No Yes   Sig: Administer 1 drop to both eyes daily at bedtime   magnesium hydroxide (MILK OF MAGNESIA) 400 mg/5 mL oral suspension More than a month  No No   Sig: Take 30 mL by mouth daily as needed (every 3rd day)   nystatin (MYCOSTATIN) powder More than a month  No No   Sig: Apply topically 3 (three) times a day   polyvinyl alcohol (LIQUIFILM TEARS) 1.4 % ophthalmic solution More than a month  No No   Sig: Administer 1 drop to both eyes as needed for dry eyes   sertraline (ZOLOFT) 100 mg tablet 9/29/2023 at 0900  No Yes   Sig: Take 1 tablet (100 mg total) by mouth daily   tamsulosin (FLOMAX) 0.4 mg 9/28/2023 at 1600  No Yes   Sig: Take 1 capsule (0.4 mg total) by mouth daily with dinner      Facility-Administered Medications: None       Past Medical History:   Diagnosis Date   • BPH (benign prostatic hyperplasia)    • Chronic kidney disease    • COPD (chronic obstructive pulmonary disease) ([facility])    • Dementia ([facility])    • Depression    • Diabetes ([address])    • DVT (deep venous thrombosis) ([facility])    • Dysphagia    • Gastrointestinal tube present ([facility])    • GERD (gastroesophageal reflux disease)    • Glaucoma    • HTN (hypertension)    • Indwelling Lieberman catheter present    • Parkinsons ([address])    • Prostate cancer ([address])        Past Surgical History:   Procedure Laterality Date   • IVC FILTER INSERTION      Per patient's Daughter/Caregiver Pao during inpatient admission   • MASTECTOMY Left        History reviewed. No pertinent family history.   I have reviewed and agree with the history as documented. E-Cigarette/Vaping   • E-Cigarette Use Never User      E-Cigarette/Vaping Substances     Social History     Tobacco Use   • Smoking status: Never   • Smokeless tobacco: Never   Vaping Use   • Vaping Use: Never used   Substance Use Topics   • Alcohol use: Never   • Drug use: Never       Review of Systems   Constitutional: Negative. HENT: Negative. Eyes: Negative. Respiratory: Negative. Cardiovascular: Negative. Gastrointestinal: Negative. Endocrine: Negative. Genitourinary: Positive for hematuria. Musculoskeletal: Negative. Skin: Negative. Allergic/Immunologic: Negative. Neurological: Negative. Hematological: Negative. Psychiatric/Behavioral: Negative. All other systems reviewed and are negative. Physical Exam  Physical Exam  Vitals and nursing note reviewed. Constitutional:       Appearance: Normal appearance. HENT:      Head: Normocephalic and atraumatic. Nose: Nose normal.      Mouth/Throat:      Mouth: Mucous membranes are moist.   Eyes:      Extraocular Movements: Extraocular movements intact. Pupils: Pupils are equal, round, and reactive to light. Cardiovascular:      Rate and Rhythm: Normal rate and regular rhythm. Pulmonary:      Effort: Pulmonary effort is normal.      Breath sounds: Normal breath sounds. Abdominal:      General: Abdomen is flat. Bowel sounds are normal.      Palpations: Abdomen is soft. Musculoskeletal:         General: Normal range of motion. Cervical back: Normal range of motion and neck supple. Skin:     General: Skin is warm. Capillary Refill: Capillary refill takes less than 2 seconds. Neurological:      General: No focal deficit present. Mental Status: He is alert and oriented to person, place, and time. Mental status is at baseline. Psychiatric:         Mood and Affect: Mood normal.         Thought Content:  Thought content normal.         Vital Signs  ED Triage Vitals [09/29/23 1557]   Temperature Pulse Respirations Blood Pressure SpO2   98 °F (36.7 °C) 71 20 114/59 95 %      Temp Source Heart Rate Source Patient Position - Orthostatic VS BP Location FiO2 (%)   Tympanic Monitor Lying Right arm --      Pain Score       No Pain           Vitals:    09/29/23 2030 09/29/23 2100 09/29/23 2204 09/29/23 2248   BP: (!) 146/102 150/67 147/77    Pulse: 88 88 95 79   Patient Position - Orthostatic VS: Lying Lying           Visual Acuity      ED Medications  Medications   budesonide (PULMICORT) inhalation solution 0.25 mg (0.25 mg Nebulization Given 9/29/23 2248)   docusate sodium (COLACE) capsule 100 mg (has no administration in time range)   ipratropium-albuterol (DUO-NEB) 0.5-2.5 mg/3 mL inhalation solution 3 mL (3 mL Nebulization Given 9/29/23 2245)   latanoprost (XALATAN) 0.005 % ophthalmic solution 1 drop (1 drop Both Eyes Given 9/29/23 2308)   melatonin tablet 6 mg (6 mg Per G Tube Given 9/29/23 2308)   nystatin (MYCOSTATIN) powder ( Topical Given 9/29/23 2308)   artificial tear (LUBRIFRESH P.M.) ophthalmic ointment (has no administration in time range)   tamsulosin (FLOMAX) capsule 0.4 mg (has no administration in time range)   amLODIPine (NORVASC) tablet 10 mg (has no administration in time range)   labetalol (NORMODYNE) tablet 200 mg (has no administration in time range)   sertraline (ZOLOFT) tablet 100 mg (has no administration in time range)   famotidine (PEPCID) tablet 20 mg (has no administration in time range)   magnesium hydroxide (MILK OF MAGNESIA) oral suspension 30 mL (has no administration in time range)   folic acid (FOLVITE) tablet 1 mg (has no administration in time range)   carbidopa-levodopa (SINEMET)  mg per tablet 1 tablet (has no administration in time range)   lamoTRIgine (LaMICtal) tablet 25 mg (has no administration in time range)   ascorbic acid (VITAMIN C) tablet 250 mg (has no administration in time range)   cefTRIAXone (ROCEPHIN) IVPB (premix in dextrose) 1,000 mg 50 mL (has no administration in time range)   cefTRIAXone (ROCEPHIN) IVPB (premix in dextrose) 1,000 mg 50 mL (0 mg Intravenous Stopped 9/29/23 2101)       Diagnostic Studies  Results Reviewed     Procedure Component Value Units Date/Time    Urinalysis with microscopic [[id number]]  (Abnormal) Collected: 09/29/23 2023    Lab Status: Final result Specimen: Urine, Indwelling Lieberman Catheter Updated: 09/29/23 2101     Color, UA Red     Clarity, UA Cloudy     Specific Gravity, UA 1.010     pH, UA 6.5     Leukocytes, UA Interference- unable to analyze     Nitrite, UA Interference- unable to analyze     Protein, UA       Interference- unable to analyze     mg/dl     Glucose, UA Negative mg/dl      Ketones, UA       Interference- unable to analyze     mg/dl     Urobilinogen, UA 0.2 E.U./dl      Bilirubin, UA Interference- unable to analyze     Occult Blood, UA Interference- unable to analyze     RBC, UA Innumerable /hpf      WBC, UA       Field obscured, unable to enumerate     /hpf     Epithelial Cells       Field obscured, unable to enumerate     /hpf     Bacteria, UA       Field obscured, unable to enumerate     /hpf    Urine culture [[id number]] Collected: 09/29/23 2023    Lab Status:  In process Specimen: Urine, Indwelling Lieberman Catheter Updated: 09/29/23 2030    Urine Microscopic [[id number]]  (Abnormal) Collected: 09/29/23 1629    Lab Status: Final result Specimen: Urine, Indwelling Lieberman Catheter Updated: 09/29/23 1702     RBC, UA Innumerable /hpf      WBC, UA       Field obscured, unable to enumerate     /hpf     Epithelial Cells       Field obscured, unable to enumerate     /hpf     Bacteria, UA       Field obscured, unable to enumerate     /hpf    UA (URINE) with reflex to Scope [[id number]]  (Abnormal) Collected: 09/29/23 1629    Lab Status: Edited Result - FINAL Specimen: Urine, Indwelling Lieberman Catheter Updated: 09/29/23 1700     Color, UA Red     Clarity, UA Turbid     Specific Gravity, UA 1.015     pH, UA Interference-unable to analyze     Leukocytes, UA Interference- unable to analyze     Nitrite, UA Interference- unable to analyze     Protein, UA       Interference- unable to analyze     mg/dl     Glucose, UA       Interference- unable to analyze     mg/dl     Ketones, UA       Interference- unable to analyze     mg/dl     Urobilinogen, UA       Interference-unable to analyze     E.U./dl     Bilirubin, UA Interference- unable to analyze     Occult Blood, UA Interference- unable to analyze    Comprehensive metabolic panel [[id number]]  (Abnormal) Collected: 09/29/23 1629    Lab Status: Final result Specimen: Blood from Arm, Right Updated: 09/29/23 1654     Sodium 135 mmol/L      Potassium 4.6 mmol/L      Chloride 102 mmol/L      CO2 26 mmol/L      ANION GAP 7 mmol/L      BUN 58 mg/dL      Creatinine 2.35 mg/dL      Glucose 134 mg/dL      Calcium 9.2 mg/dL      Corrected Calcium 10.0 mg/dL      AST 21 U/L      ALT 10 U/L      Alkaline Phosphatase 46 U/L      Total Protein 7.3 g/dL      Albumin 3.0 g/dL      Total Bilirubin 0.41 mg/dL      eGFR 24 ml/min/1.73sq m     Narrative:      [place] guidelines for Chronic Kidney Disease (CKD):   •  Stage 1 with normal or high GFR (GFR > 90 mL/min/1.73 square meters)  •  Stage 2 Mild CKD (GFR = 60-89 mL/min/1.73 square meters)  •  Stage 3A Moderate CKD (GFR = 45-59 mL/min/1.73 square meters)  •  Stage 3B Moderate CKD (GFR = 30-44 mL/min/1.73 square meters)  •  Stage 4 Severe CKD (GFR = 15-29 mL/min/1.73 square meters)  •  Stage 5 End Stage CKD (GFR <15 mL/min/1.73 square meters)  Note: GFR calculation is accurate only with a steady state creatinine    Magnesium [[id number]]  (Normal) Collected: 09/29/23 1629    Lab Status: Final result Specimen: Blood from Arm, Right Updated: 09/29/23 1654     Magnesium 2.0 mg/dL     Protime-INR [[id number]]  (Abnormal) Collected: 09/29/23 1629    Lab Status: Final result Specimen: Blood from Arm, Right Updated: 09/29/23 1650     Protime 14.7 seconds      INR 1.13    APTT [[id number]]  (Normal) Collected: 09/29/23 1629    Lab Status: Final result Specimen: Blood from Arm, Right Updated: 09/29/23 1650     PTT 35 seconds     CBC and differential [[id number]]  (Abnormal) Collected: 09/29/23 1629    Lab Status: Final result Specimen: Blood from Arm, Right Updated: 09/29/23 1638     WBC 9.07 Thousand/uL      RBC 3.75 Million/uL      Hemoglobin 10.4 g/dL      Hematocrit 32.7 %      MCV 87 fL      MCH 27.7 pg      MCHC 31.8 g/dL      RDW 16.4 %      MPV 9.0 fL      Platelets 379 Thousands/uL      nRBC 0 /100 WBCs      Neutrophils Relative 68 %      Immat GRANS % 0 %      Lymphocytes Relative 19 %      Monocytes Relative 8 %      Eosinophils Relative 4 %      Basophils Relative 1 %      Neutrophils Absolute 6.19 Thousands/µL      Immature Grans Absolute 0.02 Thousand/uL      Lymphocytes Absolute 1.70 Thousands/µL      Monocytes Absolute 0.72 Thousand/µL      Eosinophils Absolute 0.36 Thousand/µL      Basophils Absolute 0.08 Thousands/µL     Urine culture [[id number]] Collected: 09/29/23 1630    Lab Status: In process Specimen: Urine, Indwelling Lieberman Catheter Updated: 09/29/23 1635    Fingerstick Glucose (POCT) [[id number]]  (Abnormal) Collected: 09/29/23 1553    Lab Status: Final result Updated: 09/29/23 1554     POC Glucose 169 mg/dl                  CT abdomen pelvis wo contrast   Final Result by Roosevelt Palma MD (09/29 1825)      Low-lying Lieberman catheter with balloon in the region of the prostatic urethra. Recommend repositioning. No urinary tract calculi, although the base of the urinary bladder is obscured by streak artifact from patient's hip prostheses. No hydronephrosis. Additional findings as above. The study was marked in [facility] for immediate notification.          Workstation performed: [id number]                    Procedures  Procedures         ED Course                               SBIRT 20yo+    Flowsheet Row Most Recent Value   Initial Alcohol Screen: US AUDIT-C     1. How often do you have a drink containing alcohol? 0 Filed at: 09/29/2023 1634   2. How many drinks containing alcohol do you have on a typical day you are drinking? 0 Filed at: 09/29/2023 1634   3a. Male UNDER 65: How often do you have five or more drinks on one occasion? 0 Filed at: 09/29/2023 1634   3b. FEMALE Any Age, or MALE 65+: How often do you have 4 or more drinks on one occassion? 0 Filed at: 09/29/2023 1634   Audit-C Score 0 Filed at: 09/29/2023 1634   ROYA: How many times in the past year have you. .. Used an illegal drug or used a prescription medication for non-medical reasons? Never Filed at: 09/29/2023 1634                    Medical Decision Making  Patient evaluated in the ED with imaging labs urinalysis I spoke to Dr. Sheri Mauro the urologist who replaced the Lieberman catheter connected to continuous bladder irrigation admitted overnight for further observation. Given a dose of IV antibiotics in the ED. Hematuria: acute illness or injury  Amount and/or Complexity of Data Reviewed  External Data Reviewed: notes. Labs: ordered. Decision-making details documented in ED Course. Radiology: ordered. Decision-making details documented in ED Course. Risk  Prescription drug management. Decision regarding hospitalization.           Disposition  Final diagnoses:   Hematuria     Time reflects when diagnosis was documented in both MDM as applicable and the Disposition within this note     Time User Action Codes Description Comment    9/29/2023  7:50 PM Jimmie Samuels Add [R31.9] Hematuria     9/29/2023 11:05 PM Merced Guevara Add [Z93.1] 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[address])     9/30/2023 12:13 AM Tara Fischer Add [R31.0] Gross hematuria       ED Disposition     ED Disposition   Admit    Condition   Stable    Date/Time   Fri Sep 29, 2023  7:50 PM    Comment               Follow-up Information    None         Current Discharge Medication List CONTINUE these medications which have NOT CHANGED    Details   amLODIPine (NORVASC) 10 mg tablet Take 1 tablet (10 mg total) by mouth daily  Qty: 30 tablet, Refills: 3    Associated Diagnoses: Hypertension, unspecified type      carbidopa-levodopa (PARCOPA)  mg per disintegrating tablet Take 1 tablet by mouth 3 (three) times a day  Qty: 90 tablet, Refills: 3    Associated Diagnoses: PD (Parkinson's disease)      famotidine (PEPCID) 20 mg tablet Take 1 tablet (20 mg total) by mouth 2 (two) times a day  Qty: 60 tablet, Refills: 3    Associated Diagnoses: Gastroesophageal reflux disease, unspecified whether esophagitis present      folic acid ( Folic Acid) 1 mg tablet Take 1 tablet (1 mg total) by mouth daily  Qty: 30 tablet, Refills: 3    Associated Diagnoses: Skin ulcer of sacrum, unspecified ulcer stage ([facility])      lamoTRIgine (LaMICtal) 25 mg tablet Take 1 tablet (25 mg total) by mouth 2 (two) times a day  Qty: 60 tablet, Refills: 3    Associated Diagnoses: Severe dementia, unspecified dementia type, unspecified whether behavioral, psychotic, or mood disturbance or anxiety ([facility])      latanoprost (XALATAN) 0.005 % ophthalmic solution Administer 1 drop to both eyes daily at bedtime  Qty: 1.5 mL, Refills: 3    Associated Diagnoses: Glaucoma of both eyes, unspecified glaucoma type      Melatonin 5 MG/ML LIQD Take 2 mL (10 mg total) by mouth daily at bedtime  Qty: 60 mL, Refills: 3    Associated Diagnoses: Insomnia, unspecified type      sertraline (ZOLOFT) 100 mg tablet Take 1 tablet (100 mg total) by mouth daily  Qty: 30 tablet, Refills: 5    Associated Diagnoses: Depression, unspecified depression type      tamsulosin (FLOMAX) 0.4 mg Take 1 capsule (0.4 mg total) by mouth daily with dinner  Qty: 30 capsule, Refills: 3    Associated Diagnoses: Benign prostatic hyperplasia, unspecified whether lower urinary tract symptoms present      ascorbic acid (VITAMIN C) 250 MG tablet Take 1 tablet (250 mg total) by mouth daily  Qty: 30 tablet, Refills: 3    Associated Diagnoses: Skin ulcer of sacrum, unspecified ulcer stage ([facility])      Blood Glucose Monitoring Suppl Supplies MISC Use every morning Check FBG daily  Qty: 1 each, Refills: 0    Associated Diagnoses: Type 2 diabetes mellitus without complication, without long-term current use of insulin ([facility])      budesonide (Pulmicort) 0.25 mg/2 mL nebulizer solution Take 2 mL (0.25 mg total) by nebulization 2 (two) times a day Rinse mouth after use. Qty: 120 mL, Refills: 3    Associated Diagnoses: Chronic obstructive pulmonary disease, unspecified COPD type ([facility])      docusate (COLACE) 50 mg/5 mL liquid Take 10 mL (100 mg total) by mouth daily  Qty: 300 mL, Refills: 3    Associated Diagnoses: Constipation, unspecified constipation type      ipratropium-albuterol (DUO-NEB) 0.5-2.5 mg/3 mL nebulizer solution Take 3 mL by nebulization 4 (four) times a day  Qty: 360 mL, Refills: 3    Associated Diagnoses: Chronic obstructive pulmonary disease, unspecified COPD type ([facility])      magnesium hydroxide (MILK OF MAGNESIA) 400 mg/5 mL oral suspension Take 30 mL by mouth daily as needed (every 3rd day)  Qty: 300 mL, Refills: 3    Associated Diagnoses: Constipation, unspecified constipation type      nystatin (MYCOSTATIN) powder Apply topically 3 (three) times a day  Qty: 60 g, Refills: 3    Associated Diagnoses: Fungal rash of torso      polyvinyl alcohol (LIQUIFILM TEARS) 1.4 % ophthalmic solution Administer 1 drop to both eyes as needed for dry eyes  Qty: 30 mL, Refills: 3    Associated Diagnoses: Dry eyes             No discharge procedures on file.     PDMP Review     None          ED Provider  Electronically Signed by           Rm Palmer DO  09/30/23 0105

## 2023-09-29 NOTE — ED NOTES
Pt arrives via squad from home with bunny boots noted on feet, pt noted with trixie lift sling under pt and gown wrapped loosely around penis and vazquez with blood noted to that area of gown. Pt noted with vazquez not secured by any vazquez secure device.         Joyce Starkey RN  09/29/23 [id number]

## 2023-09-30 LAB
ANION GAP SERPL CALCULATED.3IONS-SCNC: 8 MMOL/L
BASOPHILS # BLD AUTO: 0.07 THOUSANDS/ÂΜL (ref 0–0.1)
BASOPHILS NFR BLD AUTO: 1 % (ref 0–1)
BUN SERPL-MCNC: 59 MG/DL (ref 5–25)
CALCIUM SERPL-MCNC: 9.5 MG/DL (ref 8.4–10.2)
CHLORIDE SERPL-SCNC: 103 MMOL/L (ref 96–108)
CO2 SERPL-SCNC: 26 MMOL/L (ref 21–32)
CREAT SERPL-MCNC: 2.36 MG/DL (ref 0.6–1.3)
EOSINOPHIL # BLD AUTO: 0.26 THOUSAND/ÂΜL (ref 0–0.61)
EOSINOPHIL NFR BLD AUTO: 2 % (ref 0–6)
ERYTHROCYTE [DISTWIDTH] IN BLOOD BY AUTOMATED COUNT: 16.3 % (ref 11.6–15.1)
EST. AVERAGE GLUCOSE BLD GHB EST-MCNC: 126 MG/DL
GFR SERPL CREATININE-BSD FRML MDRD: 24 ML/MIN/1.73SQ M
GLUCOSE SERPL-MCNC: 121 MG/DL (ref 65–140)
GLUCOSE SERPL-MCNC: 147 MG/DL (ref 65–140)
GLUCOSE SERPL-MCNC: 155 MG/DL (ref 65–140)
GLUCOSE SERPL-MCNC: 162 MG/DL (ref 65–140)
GLUCOSE SERPL-MCNC: 173 MG/DL (ref 65–140)
GLUCOSE SERPL-MCNC: 197 MG/DL (ref 65–140)
HBA1C MFR BLD: 6 %
HCT VFR BLD AUTO: 31.9 % (ref 36.5–49.3)
HGB BLD-MCNC: 10.1 G/DL (ref 12–17)
IMM GRANULOCYTES # BLD AUTO: 0.05 THOUSAND/UL (ref 0–0.2)
IMM GRANULOCYTES NFR BLD AUTO: 0 % (ref 0–2)
LYMPHOCYTES # BLD AUTO: 1.73 THOUSANDS/ÂΜL (ref 0.6–4.47)
LYMPHOCYTES NFR BLD AUTO: 13 % (ref 14–44)
MAGNESIUM SERPL-MCNC: 1.9 MG/DL (ref 1.9–2.7)
MCH RBC QN AUTO: 27.1 PG (ref 26.8–34.3)
MCHC RBC AUTO-ENTMCNC: 31.7 G/DL (ref 31.4–37.4)
MCV RBC AUTO: 86 FL (ref 82–98)
MONOCYTES # BLD AUTO: 1.06 THOUSAND/ÂΜL (ref 0.17–1.22)
MONOCYTES NFR BLD AUTO: 8 % (ref 4–12)
NEUTROPHILS # BLD AUTO: 10.5 THOUSANDS/ÂΜL (ref 1.85–7.62)
NEUTS SEG NFR BLD AUTO: 76 % (ref 43–75)
NRBC BLD AUTO-RTO: 0 /100 WBCS
PLATELET # BLD AUTO: 256 THOUSANDS/UL (ref 149–390)
PMV BLD AUTO: 9.5 FL (ref 8.9–12.7)
POTASSIUM SERPL-SCNC: 3.9 MMOL/L (ref 3.5–5.3)
RBC # BLD AUTO: 3.73 MILLION/UL (ref 3.88–5.62)
SODIUM SERPL-SCNC: 137 MMOL/L (ref 135–147)
WBC # BLD AUTO: 13.67 THOUSAND/UL (ref 4.31–10.16)

## 2023-09-30 PROCEDURE — 85025 COMPLETE CBC W/AUTO DIFF WBC: CPT

## 2023-09-30 PROCEDURE — 94760 N-INVAS EAR/PLS OXIMETRY 1: CPT

## 2023-09-30 PROCEDURE — 82948 REAGENT STRIP/BLOOD GLUCOSE: CPT

## 2023-09-30 PROCEDURE — 94640 AIRWAY INHALATION TREATMENT: CPT

## 2023-09-30 PROCEDURE — 99223 1ST HOSP IP/OBS HIGH 75: CPT | Performed by: INTERNAL MEDICINE

## 2023-09-30 PROCEDURE — 92610 EVALUATE SWALLOWING FUNCTION: CPT | Performed by: SPEECH-LANGUAGE PATHOLOGIST

## 2023-09-30 PROCEDURE — 80048 BASIC METABOLIC PNL TOTAL CA: CPT

## 2023-09-30 PROCEDURE — 83735 ASSAY OF MAGNESIUM: CPT

## 2023-09-30 RX ORDER — OXYBUTYNIN CHLORIDE 5 MG/1
2.5 TABLET ORAL 2 TIMES DAILY
Status: DISCONTINUED | OUTPATIENT
Start: 2023-09-30 | End: 2023-09-30

## 2023-09-30 RX ORDER — MAGNESIUM SULFATE HEPTAHYDRATE 40 MG/ML
2 INJECTION, SOLUTION INTRAVENOUS ONCE
Status: COMPLETED | OUTPATIENT
Start: 2023-09-30 | End: 2023-09-30

## 2023-09-30 RX ORDER — ACETAMINOPHEN 325 MG/1
650 TABLET ORAL EVERY 6 HOURS PRN
Status: DISCONTINUED | OUTPATIENT
Start: 2023-09-30 | End: 2023-10-09 | Stop reason: HOSPADM

## 2023-09-30 RX ORDER — ACETAMINOPHEN 325 MG/1
650 TABLET ORAL EVERY 6 HOURS PRN
Status: DISCONTINUED | OUTPATIENT
Start: 2023-09-30 | End: 2023-09-30

## 2023-09-30 RX ORDER — IPRATROPIUM BROMIDE AND ALBUTEROL SULFATE 2.5; .5 MG/3ML; MG/3ML
3 SOLUTION RESPIRATORY (INHALATION)
Status: DISCONTINUED | OUTPATIENT
Start: 2023-09-30 | End: 2023-10-09 | Stop reason: HOSPADM

## 2023-09-30 RX ORDER — SODIUM CHLORIDE 9 MG/ML
75 INJECTION, SOLUTION INTRAVENOUS CONTINUOUS
Status: DISCONTINUED | OUTPATIENT
Start: 2023-09-30 | End: 2023-10-01

## 2023-09-30 RX ORDER — POTASSIUM CHLORIDE 20 MEQ/1
20 TABLET, EXTENDED RELEASE ORAL ONCE
Status: DISCONTINUED | OUTPATIENT
Start: 2023-09-30 | End: 2023-09-30

## 2023-09-30 RX ORDER — POTASSIUM CHLORIDE 20MEQ/15ML
20 LIQUID (ML) ORAL ONCE
Status: COMPLETED | OUTPATIENT
Start: 2023-09-30 | End: 2023-09-30

## 2023-09-30 RX ORDER — SENNOSIDES 8.8 MG/5ML
8.8 LIQUID ORAL
Status: DISCONTINUED | OUTPATIENT
Start: 2023-09-30 | End: 2023-10-09 | Stop reason: HOSPADM

## 2023-09-30 RX ORDER — OXYBUTYNIN CHLORIDE 5 MG/1
2.5 TABLET ORAL 2 TIMES DAILY
Status: DISCONTINUED | OUTPATIENT
Start: 2023-10-01 | End: 2023-10-09 | Stop reason: HOSPADM

## 2023-09-30 RX ADMIN — SODIUM CHLORIDE 75 ML/HR: 0.9 INJECTION, SOLUTION INTRAVENOUS at 21:52

## 2023-09-30 RX ADMIN — AMLODIPINE BESYLATE 10 MG: 10 TABLET ORAL at 08:36

## 2023-09-30 RX ADMIN — Medication 250 MG: at 08:36

## 2023-09-30 RX ADMIN — ACETAMINOPHEN 650 MG: 325 TABLET ORAL at 09:05

## 2023-09-30 RX ADMIN — OXYBUTYNIN CHLORIDE 2.5 MG: 5 TABLET ORAL at 17:46

## 2023-09-30 RX ADMIN — LAMOTRIGINE 25 MG: 25 TABLET ORAL at 08:35

## 2023-09-30 RX ADMIN — NYSTATIN: 100000 POWDER TOPICAL at 17:48

## 2023-09-30 RX ADMIN — MAGNESIUM SULFATE HEPTAHYDRATE 2 G: 40 INJECTION, SOLUTION INTRAVENOUS at 08:36

## 2023-09-30 RX ADMIN — FAMOTIDINE 20 MG: 20 TABLET, FILM COATED ORAL at 08:36

## 2023-09-30 RX ADMIN — IPRATROPIUM BROMIDE AND ALBUTEROL SULFATE 3 ML: 2.5; .5 SOLUTION RESPIRATORY (INHALATION) at 19:25

## 2023-09-30 RX ADMIN — LAMOTRIGINE 25 MG: 25 TABLET ORAL at 17:46

## 2023-09-30 RX ADMIN — SENNOSIDES 8.8 MG: 8.8 SYRUP ORAL at 21:53

## 2023-09-30 RX ADMIN — LABETALOL HYDROCHLORIDE 200 MG: 100 TABLET, FILM COATED ORAL at 21:51

## 2023-09-30 RX ADMIN — LATANOPROST 1 DROP: 50 SOLUTION OPHTHALMIC at 21:53

## 2023-09-30 RX ADMIN — BUDESONIDE 0.25 MG: 0.25 INHALANT ORAL at 07:15

## 2023-09-30 RX ADMIN — POTASSIUM CHLORIDE 20 MEQ: 1.5 SOLUTION ORAL at 08:35

## 2023-09-30 RX ADMIN — SODIUM CHLORIDE 75 ML/HR: 0.9 INJECTION, SOLUTION INTRAVENOUS at 10:41

## 2023-09-30 RX ADMIN — SERTRALINE HYDROCHLORIDE 100 MG: 100 TABLET ORAL at 08:36

## 2023-09-30 RX ADMIN — CARBIDOPA AND LEVODOPA 1 TABLET: 25; 100 TABLET ORAL at 08:35

## 2023-09-30 RX ADMIN — IPRATROPIUM BROMIDE AND ALBUTEROL SULFATE 3 ML: 2.5; .5 SOLUTION RESPIRATORY (INHALATION) at 07:15

## 2023-09-30 RX ADMIN — Medication 6 MG: at 21:52

## 2023-09-30 RX ADMIN — BUDESONIDE 0.25 MG: 0.25 INHALANT ORAL at 19:25

## 2023-09-30 RX ADMIN — IPRATROPIUM BROMIDE AND ALBUTEROL SULFATE 3 ML: 2.5; .5 SOLUTION RESPIRATORY (INHALATION) at 13:03

## 2023-09-30 RX ADMIN — NYSTATIN: 100000 POWDER TOPICAL at 08:37

## 2023-09-30 RX ADMIN — LABETALOL HYDROCHLORIDE 200 MG: 100 TABLET, FILM COATED ORAL at 13:31

## 2023-09-30 RX ADMIN — CARBIDOPA AND LEVODOPA 1 TABLET: 25; 100 TABLET ORAL at 17:47

## 2023-09-30 RX ADMIN — LABETALOL HYDROCHLORIDE 200 MG: 100 TABLET, FILM COATED ORAL at 05:21

## 2023-09-30 RX ADMIN — NYSTATIN: 100000 POWDER TOPICAL at 21:53

## 2023-09-30 RX ADMIN — FOLIC ACID 1 MG: 1 TABLET ORAL at 08:36

## 2023-09-30 RX ADMIN — CEFTRIAXONE 1000 MG: 1 INJECTION, SOLUTION INTRAVENOUS at 21:52

## 2023-09-30 RX ADMIN — CARBIDOPA AND LEVODOPA 1 TABLET: 25; 100 TABLET ORAL at 21:52

## 2023-09-30 NOTE — SPEECH THERAPY NOTE
Speech-Language Pathology Bedside Swallow Evaluation        Patient Name: Francisco Dumont    LLBWY'C Date: 9/30/2023     Problem List  Patient Active Problem List   Diagnosis   • Type 2 diabetes mellitus without complication, without long-term current use of insulin ([address])   • Encounter for general adult medical examination with abnormal findings   • Catheter-associated urinary tract infection    • PD (Parkinson's disease)   • Hypertension   • Chronic kidney disease   • Severe dementia ([address])   • Dysphagia   • 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[address])   • Bedbound   • Pressure injury of skin of right hip   • Skin ulcer of sacrum ([address])   • H/O left mastectomy   • History of prostate cancer   • Arm DVT (deep venous thromboembolism), acute, left (HCC)   • Indwelling Lieberman catheter present   • Chronic obstructive pulmonary disease (HCC)   • Constipation   • Gastroesophageal reflux disease   • Glaucoma of both eyes   • Insomnia   • Fungal rash of torso   • Dry eyes   • Depression   • Benign prostatic hyperplasia   • Gross hematuria       Past Medical History  Past Medical History:   Diagnosis Date   • BPH (benign prostatic hyperplasia)    • Chronic kidney disease    • COPD (chronic obstructive pulmonary disease) ([facility])    • Dementia ([address])    • Depression    • Diabetes ([address])    • DVT (deep venous thrombosis) ([facility])    • Dysphagia    • Gastrointestinal tube present ([facility])    • GERD (gastroesophageal reflux disease)    • Glaucoma    • HTN (hypertension)    • Indwelling Lieberman catheter present    • Parkinsons ([address])    • Prostate cancer ([address])        Past Surgical History  Past Surgical History:   Procedure Laterality Date   • IVC FILTER INSERTION      Per patient's Daughter/Caregiver Pao during inpatient admission   • MASTECTOMY Left          Current Medical Status  Pt is a 80 y.o. male who presented to St/ [address] [place] [address] with hematuria.  Patient with past medical history of prostate cancer s/p radical prostatectomy long time ago, s/p mastectomy due to breast cancer ,s/p IVC filter placement CKD ,hypertension , Parkinson's , absence seizures. He has a chronic Lieberman for urine retention/UTIs. History of Parkinson's dementia mostly bedbound recently discharged from another hospital for evaluation of sacral ulcers. SLP consult requested due to daughter report of failing swallow evaluations multiple times during prior hospital stay. I spoke with his daughter Joselyn Barahona over the phone who reports he was hospitalized in July and had dysphagia and aspiration PNA. She characterized this as "aspirating with everything" and sometimes wouldn't swallow "the food would just stay on his tongue." She reports she is not sure if a VBSS was completed but reports he was seen by SLP at nursing home who started exercises with him. She reports he has been strict NPO and receives bolus feeding via PEG. Past medical history:   Please see H&P for details    Special Studies:  9/29 CT ab/pelvis wo contrast: Low-lying Lieberman catheter with balloon in the region of the prostatic urethra. Recommend repositioning. No urinary tract calculi, although the base of the urinary bladder is obscured by streak artifact from patient's hip prostheses. No hydronephrosis.     Swallow Information   Current Risks for Dysphagia & Aspiration: known history of dysphagia and known history of aspiration     Current Symptoms/Concerns: prior dysphagia hx    Current Diet: NPO      Baseline Diet: NPO with tube feeds    Baseline Assessment   Behavior/Cognition: alert    Speech/Language Status: able to follow commands inconsistently and limited verbal output    Patient Positioning: upright in bed    Swallow Mechanism Exam   Facial: masked facies  Labial: unable to test 2/2 limited command following  Lingual: bilateral decreased strength and decreased coordination  Velum: symmetrical  Mandible: adequate ROM  Dentition: adequate  Vocal quality: difficult to assess given limited verbalizations but appeared clear/adequate   Volitional Cough: fair   Resp: RA    Consistencies Assessed and Performance   Consistencies Administered: thin liquids, nectar thick, honey thick and puree  Specific materials administered included: applesauce, thick and thin liquids    Oral Stage:  Straw suck. Bolus retrieval and anterior containment appeared adequate- no anterior spill noted. Bolus formation and transfer were incoordinated at times with occ bolus holding but eventual transfer noted with no significant oral residue. He was noted to have lingual pumping throughout. No sxs of reduced oral control posteriorly. Pharyngeal Stage:   Swallowing initiation appeared prompt- mildly delayed. Laryngeal rise was palpated and judged to be within functional limits. Throat clear noted at times with liquids ( minimal). No coughing, additional throat clearing, change in vocal quality or respiratory status noted today. Esophageal Concerns: none reported/noted    Summary   Assessment limited to the above materials however s/s suggestive of mild-moderate oral and suspected minimal pharyngeal dysphagia with the materials administered today. Cannot r/o silent aspiration- currently awaiting response from SLP at [facility] (left message on vm) to determine if study was completed recently. Discussed with daughter and MD that some po would be good to prevent further decline in swallow if he is safely able to tolerate some but given report above I would hold off on po at this time pending further f/u assessment. Recommendations: NPO with tube feeds     Recommended Form of Meds: via PEG     Aspiration precautions and compensatory swallowing strategies: maintain oral care and upright position during feeding    Results Reviewed with: patient, RN, MD and family     Dysphagia Goals: pt will participate in repeat swallow eval to determine safety of po intake and/or further instrumental testing.  and pt will participate in VBS as indicated    Plan  Will f/u    Brittany Coulter M.S., [address] [facility]  Speech Language Pathologist   Available via [address] [place] [address] #[id number]  [place] #[id number]

## 2023-09-30 NOTE — ASSESSMENT & PLAN NOTE
Present on admission patient had blood in his Lieberman that daughter noticed. Prior to arrival patient was recently discharged from [facility] in [place] 2 days prior to mission for chronic UTI and bacteremia. Patient's daughter reported this was the first hematuria since discharge. In the ED patient was given new Lieberman due to patient attempting to pull out previous. During an admission patient had a TURPT found suspicious mass in bladder. PSA was slightly elevated, mass suspected to be bladder cancer versus retained prostate tissue status post prostatectomy. Of note patient's urine culture also positive for Pseudomonas.     · Patient's antibiotic regimen changed to Zosyn. · Patient will complete 5-day course on 10/8  · Urology consulted  · Plan for voiding trial on 10/7 to [place] cath.    · Status post continuous bladder irrigation  · Soft restraints, since patient keeps pulling out the catheter

## 2023-09-30 NOTE — PHYSICAL THERAPY NOTE
Attempted PT evaluation however pt declined. Pt reports he wants to sleep. Will follow.   Alexn Due PT  [id number]     09/30/23 1017   Note Type   Note type Cancelled Session   Cancel Reasons Refusal

## 2023-09-30 NOTE — ASSESSMENT & PLAN NOTE
Chart review patient noted to have a chronic history of dementia with progressive severity. Suspected in setting of Parkinson's disease. Patient of note has been seen by neurologist for his Parkinson's disease. Patient's home medication regimen consist of Lamictal, Zoloft and Parcopa. Of note patient has resting tremors present in all 4 limbs however more pronounced in his left leg.     · Continue on home medication regimen  · Patient requiring soft restraints due to excessive pulling at Lieberman catheter

## 2023-09-30 NOTE — ASSESSMENT & PLAN NOTE
Prior to admission patient reported to have chronic sacral wound as patient is nonambulatory. Patient's wound described as expanding with right hip with skin peeling.      · Reposition every 2 hours  · Consult for wound care placed  · Follow recommendations  · Monitor closely

## 2023-09-30 NOTE — PLAN OF CARE
Recommendations: NPO with tube feeds     Recommended Form of Meds: via PEG     Aspiration precautions and compensatory swallowing strategies: maintain oral care and upright position during feeding

## 2023-09-30 NOTE — CONSULTS
H&P Exam - Urology       Patient: Emmie Goddard   : 1940 Sex: male   MRN: [id number]     CSN: [id number]      History of Present Illness   HPI:  Emmie Goddard is a 80 y.o. male with history of prostate cancer status post radical prostatectomy years ago recently admitted to [facility] for 66-HBD stay for complicated UTI having chronic Lieberman catheter exchanged at 4-week intervals in light of chronic urinary tract infections discharged from hospital 2 days ago presents to the office today now residing with daughter in the area with gross hematuria patient seen in the ER by ER staff stat urologic consult called for indwelling 18 Indonesian 9 Silastic catheter appears to be 80% out of the penis CAT scan confirms Lieberman balloon to be in the urethra patient cannot give any urologic history daughter states last Lieberman catheter change done by nurses no difficulty discharged 2 days ago patient apparently may have inadvertently pulled Lieberman down into bladder neck CAT scan confirms catheter to be at or in the bladder neck seen at the bedside attempted irrigation by myself unsuccessful Lieberman lubricated balloon taken down and placed into the bladder lumen with immediate rush of gross hematuria some clots irrigated 2 2 L normal saline cystoscopy over 22 Indonesian three-way Lieberman catheter hand irrigated for multiple clots now with continuous bladder irrigation        Review of Systems:   Constitutional:  Negative for activity change, fever, chills and diaphoresis. HENT: Negative for hearing loss and trouble swallowing. Eyes: Negative for itching and visual disturbance. Respiratory: Negative for chest tightness and shortness of breath. Cardiovascular: Negative for chest pain, edema. Gastrointestinal: Negative for abdominal distention, na abdominal pain, constipation, diarrhea, Nausea and vomiting.    Genitourinary: Negative for decreased urine volume, difficulty urinating, dysuria, enuresis, frequency, hematuria and urgency. Musculoskeletal: Negative for gait problem and myalgias. Neurological: Negative for dizziness and headaches. Hematological: Does not bruise/bleed easily. Historical Information   Past Medical History:   Diagnosis Date   • BPH (benign prostatic hyperplasia)    • Chronic kidney disease    • COPD (chronic obstructive pulmonary disease) ([facility])    • Dementia ([address])    • Depression    • Diabetes ([address])    • DVT (deep venous thrombosis) ([facility])    • Dysphagia    • Gastrointestinal tube present ([facility])    • GERD (gastroesophageal reflux disease)    • Glaucoma    • HTN (hypertension)    • Indwelling Lieberman catheter present    • Parkinsons ([address])    • Prostate cancer ([address])      Past Surgical History:   Procedure Laterality Date   • MASTECTOMY Left      Social History   Social History     Substance and Sexual Activity   Alcohol Use Not on file     Social History     Substance and Sexual Activity   Drug Use Not on file     Social History     Tobacco Use   Smoking Status Not on file   Smokeless Tobacco Not on file     Family History: No family history on file. Meds/Allergies   (Not in a hospital admission)    Allergies   Allergen Reactions   • Lactose - Food Allergy GI Intolerance       Objective   Vitals: /63 (BP Location: Right arm)   Pulse 72   Temp 98 °F (36.7 °C) (Tympanic)   Resp 16   Ht 5' 11" (1.803 m)   SpO2 97%     Physical Exam:  General Alert awake   Normocephalic atraumatic PERRLA  Lungs clear bilaterally  Cardiac normal S1 normal S2  Abdomen soft, flank pain  Penile catheter 80% out of penis gross hematuria  Extremities no edema    No intake/output data recorded.     Invasive Devices     Peripheral Intravenous Line  Duration           Peripheral IV 09/29/23 Proximal;Right;Ventral (anterior) Forearm <1 day          Drain  Duration           Gastrostomy/Enterostomy  -- days    Urethral Catheter -- days                    Lab Results: CBC:   Lab Results   Component Value Date    WBC 9.07 09/29/2023    HGB 10.4 (L) 09/29/2023    HCT 32.7 (L) 09/29/2023    MCV 87 09/29/2023     09/29/2023    RBC 3.75 (L) 09/29/2023    MCH 27.7 09/29/2023    MCHC 31.8 09/29/2023    RDW 16.4 (H) 09/29/2023    MPV 9.0 09/29/2023    NRBC 0 09/29/2023     CMP:   Lab Results   Component Value Date     09/29/2023    CO2 26 09/29/2023    BUN 58 (H) 09/29/2023    CREATININE 2.35 (H) 09/29/2023    CALCIUM 9.2 09/29/2023    AST 21 09/29/2023    ALT 10 09/29/2023    ALKPHOS 46 09/29/2023    EGFR 24 09/29/2023     Urinalysis:   Lab Results   Component Value Date    COLORU Red 09/29/2023    CLARITYU Turbid 09/29/2023    SPECGRAV 1.015 09/29/2023    PHUR Interference-unable to analyze (A) 09/29/2023    LEUKOCYTESUR Interference- unable to analyze (A) 09/29/2023    NITRITE Interference- unable to analyze 09/29/2023    GLUCOSEU Interference- unable to analyze 09/29/2023    KETONESU Interference- unable to analyze (A) 09/29/2023    BILIRUBINUR Interference- unable to analyze (A) 09/29/2023    BLOODU Interference- unable to analyze (A) 09/29/2023     Urine Culture: No results found for: "URINECX"  PSA: No results found for: "PSA"        Assessment/ Plan:  Gross hematuria  Traumatic Lieberman catheter in bladder neck prostatic urethra  18 Zimbabwean catheter removed 25 Zimbabwean three-way Lieberman catheter inserted with difficulty over what appears to be bladder neck contracture  Continuous bladder irrigation  Discussed with patient and daughter going to be living out here and in light of difficult cath would suggest cystoscopy if bladder neck contracture noted would need to have this opened to make catheter insertion easier in the future Lieberman catheter apparently in due to chronic UTIs can DC Lieberman start antibiotic prophylaxis if warranted we will discuss in office after discharge  Urine cultures pending      Malissa So MD

## 2023-09-30 NOTE — ASSESSMENT & PLAN NOTE
Chronic, stable  · Patient was recently started on labetalol 200 mg every 8 hourly  · Continue his home medications  · Monitor blood pressure

## 2023-09-30 NOTE — ASSESSMENT & PLAN NOTE
Chronic, stable -resting tremors present in all 4 limbs, more pronounced on his left leg.   · Patient has been on carbidopa levodopa for a long time,  · Continue home medications

## 2023-09-30 NOTE — ASSESSMENT & PLAN NOTE
Suspected due to chronic illnesses and other neurological conditions    · Continue home dose of Zoloft 100 mg daily

## 2023-09-30 NOTE — OCCUPATIONAL THERAPY NOTE
OT EVALUATION       09/30/23 1216   Note Type   Note type Cancelled Session   Cancel Reasons Refusal   Licensure   [place] License Number  [facility] [id number] [facility] OTR/L [id number]

## 2023-09-30 NOTE — PROGRESS NOTES
Daily Progress Note - [facility]  Family Medicine Residency  Princess Muñiz 80 y.o. male MRN: [id number]  Unit/Bed#: [address] Encounter: [id number]  Admitting Physician: Eusebia Navarro MD   PCP: ROSITA Monae  Date of Admission:  9/29/2023  3:48 PM    Assessment and Plan    * Gross hematuria  Assessment & Plan  Acute on chronic     plan:  · Continue ceftriaxone 1 gq 24hourly for covering gram neg organism due to prior history of chronic UTIs  · Urgent urology consult- seen by Dr. Montserrat Petersen who advised:  · Change Vazquez's catheter from 18 to 22  · Continuous bladder irrigation  · He will consider doing cystoscopy and if found having bladder neck contracture, would need to have the contracture opened to make catheter insertion easier in the future   · Consider d'cing Vazquez's with antibiotics prophylaxis  · This is an ongoing discussion after discharge  · Urine cultures sent, follow results  · Soft restraints, since patient keeps pulling out the catheter    Catheter-associated urinary tract infection   Assessment & Plan  Patient has a history of developing chronic UTIs. UA showed red,cloudy, turbid urine, nitrite negative, but the results showed interference - unable to analyze    Plan  · Vazquez's catheter changed from 18 to 22  · Patient pulling out vazquez's catheter, put soft restraints  · Follow urine culture  · Urology following, appreciate recommendations  · See plan under gross hematuria    Depression  Assessment & Plan  Chronic, stable  Continue home dose of Zoloft 100 mg daily    Dry eyes  Assessment & Plan  Patient has a chronic problem of having dry eyes  Continue putting artificial tears, 1 drop in both eyes as needed    Gastroesophageal reflux disease  Assessment & Plan  Chronic, stable  Continue home dose of famotidine 20 mg every other day    Chronic obstructive pulmonary disease (HCC)  Assessment & Plan  Chronic, stable.   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 Dysphagia  Assessment & Plan  Patient is unable to swallow his medication, failed speech evaluation at his prior hospital admission. G-tube was placed. Consult for speech evaluation ordered    Severe dementia [facility])  Assessment & Plan  Chronic, upon chart review, no documentation was found for absence seizures, EEG or neurology note. Patient sees a neurologist for his Parkinson's. Continue Zoloft 100 mg daily        Chronic kidney disease  Assessment & Plan  Lab Results   Component Value Date    EGFR 24 09/30/2023    EGFR 24 09/29/2023    CREATININE 2.36 (H) 09/30/2023    CREATININE 2.35 (H) 09/29/2023       Chronic, stable  · Unclear baseline since no prior records exist  · Avoid nephrotoxic drugs  · Avoid dehydration  · Trend creatinine levels  · Do Dose adjustment for medications as appropriate      Hypertension  Assessment & Plan  Chronic, stable  · Patient was recently started on labetalol 200 mg every 8 hourly  · Continue his home medications  · Monitor blood pressure    PD (Parkinson's disease)  Assessment & Plan  Chronic, stable -resting tremors present in all 4 limbs, more pronounced on his left leg. · Patient has been on carbidopa levodopa for a long time,  · Continue home medications      VTE Pharmacologic Prophylaxis: VTE Score: 11 High Risk (Score >/= 5) - Pharmacological DVT Prophylaxis Contraindicated. Sequential Compression Devices Ordered. Patient Centered Rounds: I have performed bedside rounds with nursing staff today.     Discussions with Specialists or Other Care Team Provider: Urology    Education and Discussions with Family / Patient:   Patient Information Sharing: With the consent of Nay Pelayo , their loved ones Jeanette Galicia, daughter) were notified today by inpatient team of the patient’s condition and current plan. All questions answered. Time Spent for Care: Greater than 30 minutes. More than 50% of total time spent on counseling and coordination of care as described above. Current Length of Stay: 1 day(s)    Current Patient Status: Inpatient   Certification Statement: The patient will continue to require additional inpatient hospital stay due to continuous bladder irrigation and possible cystoscopy    Discharge Plan: Pending    Code Status: Level 3 - DNAR and DNI    Subjective:   78-year-old male resting comfortably in bed. He notes that he is tired. He denies any headache, nausea, or vomiting. He currently has soft wrist restraints to stop him from pulling out Lieberman    Objective     Objective:   Vitals:   Temp (24hrs), Av.4 °F (36.9 °C), Min:98 °F (36.7 °C), Max:98.8 °F (37.1 °C)    Temp:  [98 °F (36.7 °C)-98.8 °F (37.1 °C)] 98.8 °F (37.1 °C)  HR:  [71-95] 78  Resp:  [16-20] 18  BP: (114-150)/() 135/64  SpO2:  [95 %-100 %] 96 %  Body mass index is 26.5 kg/m². Input and Output Summary (last 24 hours): Intake/Output Summary (Last 24 hours) at 2023 0855  Last data filed at 2023 0544  Gross per 24 hour   Intake --   Output -1360 ml   Net 1360 ml       Physical Exam:   Physical Exam  Vitals reviewed. Constitutional:       Appearance: Normal appearance. He is normal weight. Comments: dehydrated   HENT:      Head: Normocephalic and atraumatic. Right Ear: External ear normal.      Left Ear: External ear normal.      Nose: Nose normal.      Mouth/Throat:      Mouth: Mucous membranes are moist.      Pharynx: Oropharynx is clear. Eyes:      Extraocular Movements: Extraocular movements intact. Cardiovascular:      Rate and Rhythm: Normal rate and regular rhythm. Pulses: Normal pulses. Heart sounds: Normal heart sounds. No murmur heard. Pulmonary:      Effort: Pulmonary effort is normal. No respiratory distress. Breath sounds: Normal breath sounds. No wheezing. Abdominal:      General: Bowel sounds are normal. There is no distension. Palpations: Abdomen is soft. Tenderness: There is no abdominal tenderness. Hernia: No hernia is present. Comments: PEG tube inserted, in place   Genitourinary:     Comments: No pain around vazquez's  Musculoskeletal:         General: Normal range of motion. Cervical back: Normal range of motion. Skin:     General: Skin is warm and dry. Capillary Refill: Capillary refill takes less than 2 seconds. Coloration: Skin is pale. Neurological:      Mental Status: He is alert. He is disoriented. Motor: Weakness present. Coordination: Coordination normal.      Comments: Patient was dozing off   Psychiatric:         Mood and Affect: Mood normal.         Behavior: Behavior normal.         Thought Content: Thought content normal.         Judgment: Judgment normal.      Comments: Patient is drowsy, falling asleep during encounter    He is oriented to self, but not place or time.            Additional Data:     Labs:  Results from last 7 days   Lab Units 09/30/23  0521   WBC Thousand/uL 13.67*   HEMOGLOBIN g/dL 10.1*   HEMATOCRIT % 31.9*   PLATELETS Thousands/uL 256   NEUTROS PCT % 76*   LYMPHS PCT % 13*   MONOS PCT % 8   EOS PCT % 2     Results from last 7 days   Lab Units 09/30/23  0521 09/29/23  1629   POTASSIUM mmol/L 3.9 4.6   CHLORIDE mmol/L 103 102   CO2 mmol/L 26 26   BUN mg/dL 59* 58*   CREATININE mg/dL 2.36* 2.35*   CALCIUM mg/dL 9.5 9.2   ALK PHOS U/L  --  46   ALT U/L  --  10   AST U/L  --  21     Results from last 7 days   Lab Units 09/29/23  1629   INR  1.13     Results from last 7 days   Lab Units 09/30/23  0518 09/29/23  2255 09/29/23  1553   POC GLUCOSE mg/dl 147* 132 169* * I Have Reviewed All Lab Data Listed Above. * Additional Pertinent Lab Tests Reviewed: All Labs For Current Hospital Admission Reviewed    Imaging:    Imaging Reports Reviewed Today Include: All imaging  Imaging Personally Reviewed by Myself Includes: All imaging    Recent Cultures (last 7 days):           Last 24 Hours Medication List:   Current Facility-Administered Medications   Medication Dose Route Frequency Provider Last Rate   • acetaminophen  650 mg Oral Q6H PRN Reggie Johnson MD     • amLODIPine  10 mg Per G Tube Daily Reji Sosa MD     • artificial tear   Both Eyes Q4H PRN Reji Sosa MD     • ascorbic acid  250 mg Per G Tube Daily Reji Sosa MD     • budesonide  0.25 mg Nebulization BID Reji Sosa MD     • carbidopa-levodopa  1 tablet Per G Tube TID Reji Sosa MD     • cefTRIAXone  1,000 mg Intravenous Q24H Reji Sosa MD     • docusate sodium  100 mg Oral Daily Reji Sosa MD     • famotidine  20 mg Per G Tube Every Other Day Reji Sosa MD     • folic acid  1 mg Per G Tube Daily Reji Sosa MD     • ipratropium-albuterol  3 mL Nebulization TID Reggie Johnson MD     • labetalol  200 mg Per G Tube Q8H [address] Reji Sosa MD     • lamoTRIgine  25 mg Per G Tube BID Reji Sosa MD     • latanoprost  1 drop Both Eyes HS Reji Sosa MD     • magnesium hydroxide  30 mL Per G Tube Daily PRN Reji Sosa MD     • magnesium sulfate  2 g Intravenous Once Mary Jimenes MD 2 g (09/30/23 0836)   • melatonin  6 mg Per G Tube HS Reji Sosa MD     • nystatin   Topical TID Reji Sosa MD     • sertraline  100 mg Per G Tube Daily Reji Sosa MD     • tamsulosin  0.4 mg Oral Daily With Deandre Demarco MD               ** Please Note: Dictation voice to text software may have been used in the creation of this document.  **    Mary Jimenes MD  09/30/23  8:55 AM

## 2023-09-30 NOTE — QUICK NOTE
PICC line was removed by Dr. Leighton Gastelum. No bleeding, erythema, tenderness around the site noted. Patient was reassured.

## 2023-09-30 NOTE — PLAN OF CARE
Problem: Potential for Falls  Goal: Patient will remain free of falls  Description: INTERVENTIONS:  - Educate patient/family on patient safety including physical limitations  - Instruct patient to call for assistance with activity   - Consult OT/PT to assist with strengthening/mobility   - Keep Call bell within reach  - Keep bed low and locked with side rails adjusted as appropriate  - Keep care items and personal belongings within reach  - Initiate and maintain comfort rounds  - Make Fall Risk Sign visible to staff  - Offer Toileting every 2 Hours, in advance of need  - Initiate/Maintain bed alarm  - Obtain necessary fall risk management equipment: bed alarm, yellow bracelet, yellow socks  - Apply yellow socks and bracelet for high fall risk patients  - Consider moving patient to room near nurses station  Outcome: Progressing     Problem: GENITOURINARY - ADULT  Goal: Absence of urinary retention  Description: INTERVENTIONS:  - Assess patient’s ability to void and empty bladder  - Monitor I/O  - Bladder scan as needed  - Discuss with physician/AP medications to alleviate retention as needed  - Discuss catheterization for long term situations as appropriate  Outcome: Progressing  Goal: Urinary catheter remains patent  Description: INTERVENTIONS:  - Assess patency of urinary catheter  - If patient has a chronic vazquez, consider changing catheter if non-functioning  - Follow guidelines for intermittent irrigation of non-functioning urinary catheter  Outcome: Progressing     Problem: SKIN/TISSUE INTEGRITY - ADULT  Goal: Skin Integrity remains intact(Skin Breakdown Prevention)  Description: Assess:  -Perform Henry assessment every shift  -Clean and moisturize skin every day and PRN  -Inspect skin when repositioning, toileting, and assisting with ADLS  -Assess under medical devices such as catheter tubing every shift  -Assess extremities for adequate circulation and sensation     Bed Management:  -Have minimal linens on bed & keep smooth, unwrinkled  -Change linens as needed when moist or perspiring  -Avoid sitting or lying in one position for more than 2 hours while in bed  -Keep HOB at 30 or more degrees     Activity:  -Mobilize patient 3 times a day  -Encourage or provide ROM exercises   -Turn and reposition patient every 2 Hours  -Use appropriate equipment to lift or move patient in bed    Skin Care:  -Avoid use of baby powder, tape, friction and shearing, hot water or constrictive clothing  -Relieve pressure over bony prominences using foam dressings, green wedges, Q2 hour turns  -Do not massage red bony areas    Next Steps:  -Teach patient strategies to minimize risks such as not to scratch skin   -Consider consults to  interdisciplinary teams such as nutrition, wound care.    Outcome: Progressing  Goal: Incision(s), wounds(s) or drain site(s) healing without S/S of infection  Description: INTERVENTIONS  - Assess and document dressing, incision, wound bed, drain sites and surrounding tissue  - Provide patient and family education  - Perform skin care/dressing changes every day and PRN  Outcome: Progressing  Goal: Pressure injury heals and does not worsen  Description: Interventions:  - Implement low air loss mattress or specialty surface (Criteria met)  - Apply silicone foam dressing  - Perform passive or active ROM every shift  - Turn and reposition patient & offload bony prominences every 2 hours   - Utilize friction reducing device or surface for transfers   - Consider consults to  interdisciplinary teams such as wound care, pt/ot  - Use incontinent care products after each incontinent episode such as after a bowel movement  - Consider nutrition services referral as needed  Outcome: Progressing     Problem: SAFETY,RESTRAINT: NV/NON-SELF DESTRUCTIVE BEHAVIOR  Goal: Remains free of harm/injury (restraint for non violent/non self-detsructive behavior)  Description: INTERVENTIONS:  - Instruct patient/family regarding restraint use   - Assess and monitor physiologic and psychological status   - Provide interventions and comfort measures to meet assessed patient needs   - Identify and implement measures to help patient regain control  - Assess readiness for release of restraint   Outcome: Progressing  Goal: Returns to optimal restraint-free functioning  Description: INTERVENTIONS:  - Assess the patient's behavior and symptoms that indicate continued need for restraint  - Identify and implement measures to help patient regain control  - Assess readiness for release of restraint   Outcome: Progressing     Problem: Nutrition/Hydration-ADULT  Goal: Nutrient/Hydration intake appropriate for improving, restoring or maintaining nutritional needs  Description: Monitor and assess patient's nutrition/hydration status for malnutrition. Collaborate with interdisciplinary team and initiate plan and interventions as ordered. Monitor patient's weight and dietary intake as ordered or per policy. Utilize nutrition screening tool and intervene as necessary. Determine patient's food preferences and provide high-protein, high-caloric foods as appropriate.      INTERVENTIONS:  - Monitor oral intake, urinary output, labs, and treatment plans  - Assess nutrition and hydration status and recommend course of action  - Evaluate amount of meals eaten  - Assist patient with eating if necessary   - Allow adequate time for meals  - Recommend/ encourage appropriate diets, oral nutritional supplements, and vitamin/mineral supplements  - Order, calculate, and assess calorie counts as needed  - Recommend, monitor, and adjust tube feedings based on assessed needs  - Assess need for intravenous fluids  - Provide specific nutrition/hydration education as appropriate  - Include patient/family/caregiver in decisions related to nutrition  Outcome: Progressing

## 2023-09-30 NOTE — ASSESSMENT & PLAN NOTE
Prior to admission patient has chronic dry eyes.   Patient is stable on artificial tears     · Continue 1 drop in both eyes as needed

## 2023-09-30 NOTE — PROGRESS NOTES
Progress Note - Urology      Patient: Rohit Living   : 1940 Sex: male   MRN: [id number]     CSN: [id number]  Unit/Bed#: [address] [place] [address]     SUBJECTIVE:   Patient seen on afternoon rounds  CBI stopped urine crystal-clear  Hematuria related to Lieberman trauma balloon pushed into bladder neck sphincter      Objective   Vitals: /69   Pulse 81   Temp 98.4 °F (36.9 °C)   Resp 18   Ht 5' 11" (1.803 m)   Wt 86.2 kg (190 lb)   SpO2 97%   BMI 26.50 kg/m²     I/O last 24 hours: In: -   Out: -460       Physical Exam:   General Alert awake   Normocephalic atraumatic PERRLA  Lungs clear bilaterally  Cardiac normal S1 normal S2  Abdomen soft, flank pain  Extremities no edema      Lab Results: CBC:   Lab Results   Component Value Date    WBC 13.67 (H) 2023    HGB 10.1 (L) 2023    HCT 31.9 (L) 2023    MCV 86 2023     2023    RBC 3.73 (L) 2023    MCH 27.1 2023    MCHC 31.7 2023    RDW 16.3 (H) 2023    MPV 9.5 2023    NRBC 0 2023     CMP:   Lab Results   Component Value Date     2023    CO2 26 2023    BUN 59 (H) 2023    CREATININE 2.36 (H) 2023    CALCIUM 9.5 2023    AST 21 2023    ALT 10 2023    ALKPHOS 46 2023    EGFR 24 2023     Urinalysis:   Lab Results   Component Value Date    COLORU Red 2023    CLARITYU Cloudy 2023    SPECGRAV 1.010 2023    PHUR 6.5 2023    LEUKOCYTESUR Interference- unable to analyze (A) 2023    NITRITE Interference- unable to analyze 2023    GLUCOSEU Negative 2023    KETONESU Interference- unable to analyze (A) 2023    BILIRUBINUR Interference- unable to analyze (A) 2023    BLOODU Interference- unable to analyze (A) 2023     Urine Culture: No results found for: "URINECX"  PSA: No results found for: "PSA"      Assessment/ Plan:   Lieberman trauma causing gross hematuria clot retention  CBI now off  Urine clear  Patient can be discharged tomorrow with Lieberman  Will discuss with daughter to schedule cystoscopy exam under anesthesia possible transurethral resection 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

## 2023-09-30 NOTE — ASSESSMENT & PLAN NOTE
Patient has chronic/reoccurring history of UTI. Patient's UA on admission was unable to be analyzed due to interference. Note patient most recently was discharged from [facility] for UTI. Patient's urine culture grew Pseudomonas. Patient's ceftriaxone was then changed to Zosyn. Patient's repeat urine culture showed Pseudomonas however decreased colonies.      · Continue antibiotic treatment for 5-day course  · See plan under gross hematuria above  · Urology following, appreciate recommendations

## 2023-09-30 NOTE — ASSESSMENT & PLAN NOTE
On prior hospitalization patient failed speech eval.  Patient has been unable to swallow his medications and at risk for aspiration so patient was given a G-tube. Patient now receiving enteric feeds and medication been changed accordingly. · Consult for speech evaluation ordered  · Check gastrotomy tube daily  · Feeds consist of Glucerna 1.2, 85 mL/h continuous with water boluses every 4 hours.   · During admission patient's water boluses were increased due to patient's increased creatinine

## 2023-09-30 NOTE — H&P
History and Physical - [address] [place] [address]  Family Medicine Residency     Patient Information: Adam Anne 80 y.o. male MRN: [id number]  Unit/Bed#: [address] Encounter: [id number]  Admitting Physician: Annetta Hampton MD   PCP: ROSITA Monae  Date of Admission:  09/30/23     Assessment and Plan     * Gross hematuria  Assessment & Plan  POA had blood in his foleys that the daughter noticed this morning. This is his first episode of developing gross hematuria. It seemed he inadvertently pulled out the catheter into the bladder neck . He denies any pain and according to the daughter, his mental status hasn't change significantly since discharge. Of note, Patient was recently admitted due to chronic UTI and Gram positive bacteremia at [facility] and was discharged 2 days ago. Last Vazquez's was placed 2 weeks ago. Vazquez catheter apparently in place due to chronic UTIs. ED course: UA: turbid, red colored urine, nitrites negative -unable to analyze due to interference, Cr 1.35, Na 135, Mg 2.0, Hb 10.4, 1 gm Ceftriaxone x 1, new vazquez's placed, flushing done with 2L of Normal saline by Dr. Pranay Yancey    09/29: CT abd shows low-lying Vazquez catheter with balloon in the region of the prostatic urethra.   Repositioning recommended    Differentials include Traumatic Vazquez catheter in bladder neck or prostatic urethra VS undiagnosed malignancy VS cystitis    Plan:  -Continue ceftriaxone 1 gq 24hourly for covering gram neg organism due to prior history of chronic UTIs  -Urgent urology consult- seen by Dr. Pranay Yancey who advised:  · Change Vazquez's catheter from 18 to 22  · Continuous bladder irrigation  · He will consider doing cystoscopy and if found having bladder neck contracture, would need to have the contracture opened to make catheter insertion easier in the future   · Consider d'cing Vazquez's with antibiotics prophylaxis  · This is an ongoing discussion after discharge    -Urine cultures sent, follow results  -PT/OT evaluation  -Speech evaluation, since patient stopped anything orally and takes his feeds & meds through the G tube only  -Soft restraints, since patient keeps pulling out the catheter    PD (Parkinson's disease)  Assessment & Plan  Chronic, stable -resting tremors present in all 4 limbs, more pronounced on his left leg. · Patient has been on carbidopa levodopa for a long time,  · Continue home medications    Catheter-associated urinary tract infection   Assessment & Plan  Patient has a history of developing chronic UTIs. According to the daughter, he has been getting readmitted very frequently in the last 1 year. 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  Assessment & Plan  Patient has a chronic sacral wound, long with a right hip wound with skin peeling off indicating signs of pressure injury  · Reposition every 2 hours  · Consult for wound care placed  · Monitor closely        Depression  Assessment & Plan  Chronic, stable  Continue home dose of Zoloft 100 mg daily    Dry eyes  Assessment & Plan  Patient has a chronic problem of having dry eyes  Continue putting artificial tears, 1 drop in both eyes as needed    Gastroesophageal reflux disease  Assessment & Plan  Chronic, stable  Continue home dose of famotidine 20 mg every other day    Chronic obstructive pulmonary disease (HCC)  Assessment & Plan  Chronic, stable. 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     Dysphagia  Assessment & Plan  Patient is unable to swallow his medication, failed speech evaluation at his prior hospital admission. G-tube was placed. Consult for speech evaluation ordered    Severe dementia [facility])  Assessment & Plan  Chronic    · According to the daughter, patient has significant deterioration from his baseline in the past year. He does not recognize her, does not know where he is but is able to give his full name. He has been noted to have a few episodes of absence seizures by the nursing home in [place] for which lamotrigine was started. · Upon chart review, no documentation was found for absence seizures, EEG or neurology note. Patient sees a neurologist for his Parkinson's. · Continue home medications        Chronic kidney disease  Assessment & Plan  Lab Results   Component Value Date    EGFR 24 09/29/2023    CREATININE 2.35 (H) 09/29/2023       Chronic, stable  · Unclear baseline since no prior records exist  · Avoid nephrotoxic drugs  · Avoid dehydration  · Trend creatinine levels  · Do Dose adjustment for medications as appropriate      Hypertension  Assessment & Plan  Blood pressure on admission: 114/59    Chronic, stable  · Patient was recently started on labetalol 200 mg every 8 hourly  · Continue his home medications  · Monitor blood pressure       Fluids: Not given  Electrolyte repletion: Replete  as needed. Nutrition:        Diet Orders   (From admission, onward)             Start     Ordered    09/29/23 2207  Diet NPO; Sips with meds  Diet effective now        References:    Adult Nutrition Support Algorithm    RD Therapeutic Diet Order Protocol   Question Answer Comment   Diet Type NPO    NPO Except: Sips with meds    RD to adjust diet per protocol? Yes        09/29/23 2206               VTE Prophylaxis: VTE Score: 11 High Risk (Score >/= 5) - Pharmacological DVT Prophylaxis Contraindicated. Sequential Compression Devices Ordered.   Code Status: Level 3, DNR and DNI  Anticipated Length of Stay:  Patient will be admitted on an Inpatient basis with an anticipated length of stay of  more than 2 midnights. Justification for Hospital Stay: Management of his bloody urine  Total Time for Visit, including Counseling / Coordination of Care: 45 mins. Greater than 50% of this total time spent on direct patient counseling and coordination of care. Patient Information Sharing: With the consent of Rohit Freeman, their loved ones Cedric Guallpa- daughter) were notified today by inpatient team of the patient’s condition and current plan. All questions answered. Chief Complaint:     Chief Complaint   Patient presents with   • Blood in Urine     Pt brought in by josé reportexdly from home. Per josé, pt was recently discharged from nursing home and has developed hematuria from vazquez catheter. Upon arrival, pt noted with dark red urine from catheter. Pt denies any pain at this time. Subjective      History of Present Illness:     Rohit Freeman is a 80 y.o. male with past medical history of prostate cancer s/p radical prostatectomy long time ago, s/p mastectomy due to breast cancer ,s/p IVC filter placement CKD ,hypertension , Parkinson's , absence seizures who presents to the hospital with blood in his vazquez's this morning. According to the daughter he was at home with her after being discharged from the [facility]) 2 days ago for gram-positive bacteremia, UTI and acute metabolic encephalopathy. He was paid a visit by Tuesday NP on Wednesday and this morning by a A nurse, who suggested hospital visit due to continuous bloody urine on flushing the catheter x 3. He denied chest pain, shortness of breath, headache, blood in the stools, nausea or vomiting, fever and chills. Patient was complaining of abdominal pain a day prior to the symptoms.      Of note, his daughter Cedric Guallpa) has been taking care of him and mentioned his deteriorating medical condition since the past year that included multiple falls in the nursing home, becoming less self-aware of himself and the surroundings, and recently became wheelchair-bound. He got the [facility] catheter placed at his prior admission due to multiple UTIs in the past year, however it is unclear whether the patient was having any WILSON symptoms and if a voiding trial was suggested. This is his first visit to [address] [place] [address].  On admission, urgent urological consult was placed and he was seen by Dr. Sekou Duarte who attempted irrigation of the bladder on bedside that was unsuccessful. There was gross hematuria, with clots noted. He was admitted to our service for medical management. Review of Systems:  Review of Systems   Constitutional: Negative for activity change, chills and fever. HENT: Negative for ear pain and sore throat. Eyes: Negative for pain and visual disturbance. Respiratory: Negative for cough and shortness of breath. Cardiovascular: Negative for chest pain, palpitations and leg swelling. Gastrointestinal: Positive for abdominal pain (lower abdominal pain). Negative for diarrhea and vomiting. Endocrine: Negative. Genitourinary: Positive for decreased urine volume (since morning) and hematuria. Negative for dysuria and genital sores. Musculoskeletal: Negative for arthralgias, back pain and joint swelling. Skin: Positive for wound (sacral). Negative for color change and rash. Allergic/Immunologic: Positive for immunocompromised state. Neurological: Positive for tremors, speech difficulty and weakness. Negative for seizures and syncope. Psychiatric/Behavioral: Positive for confusion, decreased concentration and hallucinations. All other systems reviewed and are negative.        Past Medical History:   Diagnosis Date   • BPH (benign prostatic hyperplasia)    • Chronic kidney disease    • COPD (chronic obstructive pulmonary disease) ([address])    • Dementia ([address])    • Depression    • Diabetes ([address])    • DVT (deep venous thrombosis) ([address])    • Dysphagia    • Gastrointestinal tube present ([address]) • GERD (gastroesophageal reflux disease)    • Glaucoma    • HTN (hypertension)    • Indwelling Lieberman catheter present    • Parkinsons ([address])    • Prostate cancer [facility])      Past Surgical History:   Procedure Laterality Date   • IVC FILTER INSERTION      Per patient's Daughter/Caregiver Pao during inpatient admission   • MASTECTOMY Left      Allergies   Allergen Reactions   • Lactose - Food Allergy GI Intolerance     Prior to Admission Medications   Prescriptions Last Dose Informant Patient Reported? Taking? Blood Glucose Monitoring Suppl Supplies MISC Unknown  No No   Sig: Use every morning Check FBG daily   Melatonin 5 MG/ML LIQD 9/28/2023 at 2100  No Yes   Sig: Take 2 mL (10 mg total) by mouth daily at bedtime   amLODIPine (NORVASC) 10 mg tablet 9/29/2023 at 0900  No Yes   Sig: Take 1 tablet (10 mg total) by mouth daily   ascorbic acid (VITAMIN C) 250 MG tablet More than a month  No No   Sig: Take 1 tablet (250 mg total) by mouth daily   Patient not taking: Reported on 9/29/2023   budesonide (Pulmicort) 0.25 mg/2 mL nebulizer solution More than a month  No No   Sig: Take 2 mL (0.25 mg total) by nebulization 2 (two) times a day Rinse mouth after use.    carbidopa-levodopa (PARCOPA)  mg per disintegrating tablet 9/29/2023 at 0900  No Yes   Sig: Take 1 tablet by mouth 3 (three) times a day   docusate (COLACE) 50 mg/5 mL liquid More than a month  No No   Sig: Take 10 mL (100 mg total) by mouth daily   famotidine (PEPCID) 20 mg tablet 9/29/2023 at 0900  No Yes   Sig: Take 1 tablet (20 mg total) by mouth 2 (two) times a day   folic acid (KP Folic Acid) 1 mg tablet 9/29/2023 at 0900  No Yes   Sig: Take 1 tablet (1 mg total) by mouth daily   ipratropium-albuterol (DUO-NEB) 0.5-2.5 mg/3 mL nebulizer solution More than a month  No No   Sig: Take 3 mL by nebulization 4 (four) times a day   lamoTRIgine (LaMICtal) 25 mg tablet 9/29/2023 at 0900  No Yes   Sig: Take 1 tablet (25 mg total) by mouth 2 (two) times a day latanoprost (XALATAN) 0.005 % ophthalmic solution 9/29/2023 at 0900  No Yes   Sig: Administer 1 drop to both eyes daily at bedtime   magnesium hydroxide (MILK OF MAGNESIA) 400 mg/5 mL oral suspension More than a month  No No   Sig: Take 30 mL by mouth daily as needed (every 3rd day)   nystatin (MYCOSTATIN) powder More than a month  No No   Sig: Apply topically 3 (three) times a day   polyvinyl alcohol (LIQUIFILM TEARS) 1.4 % ophthalmic solution More than a month  No No   Sig: Administer 1 drop to both eyes as needed for dry eyes   sertraline (ZOLOFT) 100 mg tablet 9/29/2023 at 0900  No Yes   Sig: Take 1 tablet (100 mg total) by mouth daily   tamsulosin (FLOMAX) 0.4 mg 9/28/2023 at 1600  No Yes   Sig: Take 1 capsule (0.4 mg total) by mouth daily with dinner      Facility-Administered Medications: None     Social History     Socioeconomic History   • Marital status:      Spouse name: Not on file   • Number of children: Not on file   • Years of education: Not on file   • Highest education level: Not on file   Occupational History   • Not on file   Tobacco Use   • Smoking status: Never   • Smokeless tobacco: Never   Vaping Use   • Vaping Use: Never used   Substance and Sexual Activity   • Alcohol use: Never   • Drug use: Never   • Sexual activity: Not on file   Other Topics Concern   • Not on file   Social History Narrative   • Not on file     Social Determinants of Health     Financial Resource Strain: Unknown (9/28/2023)    Overall Financial Resource Strain (CARDIA)    • Difficulty of Paying Living Expenses: Patient refused   Food Insecurity: Not on file   Transportation Needs: Not on file   Physical Activity: Not on file   Stress: Not on file   Social Connections: Not on file   Intimate Partner Violence: Not on file   Housing Stability: Not on file     History reviewed. No pertinent family history.             Objective     Physical Exam:   Vitals:   Blood Pressure: 147/77 (09/29/23 2204)  Pulse: 79 (09/29/23 2248)  Temperature: 98.5 °F (36.9 °C) (09/29/23 2204)  Temp Source: Tympanic (09/29/23 1557)  Respirations: 18 (09/29/23 2248)  Height: 5' 11" (180.3 cm) (09/29/23 2204)  Weight - Scale: 86.2 kg (190 lb) (09/29/23 2204)  SpO2: 98 % (09/29/23 2248)     Physical Exam  Vitals reviewed. Constitutional:       Appearance: Normal appearance. He is normal weight. Comments: dehydrated   Cardiovascular:      Rate and Rhythm: Normal rate and regular rhythm. Pulses: Normal pulses. Heart sounds: Normal heart sounds. No murmur heard. Pulmonary:      Effort: Pulmonary effort is normal. No respiratory distress. Breath sounds: Normal breath sounds. No wheezing. Abdominal:      General: Bowel sounds are normal. There is no distension. Palpations: Abdomen is soft. Tenderness: There is no abdominal tenderness. Hernia: No hernia is present. Comments: PEG tube inserted, in place   Genitourinary:     Comments: No pain around vazquez's  Musculoskeletal:         General: Normal range of motion. Skin:     General: Skin is dry. Capillary Refill: Capillary refill takes less than 2 seconds. Coloration: Skin is pale. Neurological:      General: No focal deficit present. Mental Status: He is alert and oriented to person, place, and time. Motor: Weakness present. Coordination: Coordination normal.      Comments: Patient was shaking all over, resting tremors, difficult to evaluate. Exam was limited   Psychiatric:         Mood and Affect: Mood normal.         Behavior: Behavior normal.         Thought Content: Thought content normal.      Comments: Patient is confused, drowsy, falling asleep during encounter    He knows his name, but not his age. He says he is 24. He doesn't know where he is, Did not recognize daughter on the bedside            Lab Results: I have personally reviewed pertinent reports.     Results from last 7 days   Lab Units 09/29/23  7049   WBC Thousand/uL 9.07   HEMOGLOBIN g/dL 10.4*   HEMATOCRIT % 32.7*   PLATELETS Thousands/uL 239   NEUTROS PCT % 68   LYMPHS PCT % 19   MONOS PCT % 8   EOS PCT % 4     Results from last 7 days   Lab Units 09/29/23  1629   POTASSIUM mmol/L 4.6   CHLORIDE mmol/L 102   CO2 mmol/L 26   BUN mg/dL 58*   CREATININE mg/dL 2.35*   CALCIUM mg/dL 9.2   ALK PHOS U/L 46   ALT U/L 10   AST U/L 21   EGFR ml/min/1.73sq m 24   MAGNESIUM mg/dL 2.0     Results from last 7 days   Lab Units 09/29/23  1629   INR  1.13                  Results from last 7 days   Lab Units 09/29/23 2023   COLOR UA  Red   CLARITY UA  Cloudy   SPEC GRAV UA  1.010   PH UA  6.5   LEUKOCYTES UA  Interference- unable to analyze*   NITRITE UA  Interference- unable to analyze   GLUCOSE UA mg/dl Negative   KETONES UA mg/dl Interference- unable to analyze*   BILIRUBIN UA  Interference- unable to analyze*   BLOOD UA  Interference- unable to analyze*      Results from last 7 days   Lab Units 09/29/23 2023   RBC UA /hpf Innumerable*   WBC UA /hpf Field obscured, unable to enumerate*   EPITHELIAL CELLS WET PREP /hpf Field obscured, unable to enumerate*   BACTERIA UA /hpf Field obscured, unable to enumerate*                  Imaging: I have personally reviewed pertinent reports. CT abdomen pelvis wo contrast    Result Date: 9/29/2023  Low-lying Lieberman catheter with balloon in the region of the prostatic urethra. Recommend repositioning. No urinary tract calculi, although the base of the urinary bladder is obscured by streak artifact from patient's hip prostheses. No hydronephrosis. Additional findings as above. The study was marked in [facility] for immediate notification.  Workstation performed: [id number]                   ** Please Note: This note has been constructed using a voice recognition system **     Daniel Nolasco MD  09/30/23  1:14 AM

## 2023-09-30 NOTE — PLAN OF CARE
Problem: Potential for Falls  Goal: Patient will remain free of falls  Description: INTERVENTIONS:  - Educate patient/family on patient safety including physical limitations  - Instruct patient to call for assistance with activity   - Consult OT/PT to assist with strengthening/mobility   - Keep Call bell within reach  - Keep bed low and locked with side rails adjusted as appropriate  - Keep care items and personal belongings within reach  - Initiate and maintain comfort rounds  - Make Fall Risk Sign visible to staff  - Offer Toileting every 2Hours, in advance of need  - Initiate/Maintain bed alarm  - Obtain necessary fall risk management equipment: yellow socks  - Apply yellow socks and bracelet for high fall risk patients  - Consider moving patient to room near nurses station  Outcome: Progressing     Problem: RESPIRATORY - ADULT  Goal: Achieves optimal ventilation and oxygenation  Description: INTERVENTIONS:  - Assess for changes in respiratory status  - Assess for changes in mentation and behavior  - Position to facilitate oxygenation and minimize respiratory effort  - Oxygen administered by appropriate delivery if ordered  - Initiate smoking cessation education as indicated  - Encourage broncho-pulmonary hygiene including cough, deep breathe, Incentive Spirometry  - Assess the need for suctioning and aspirate as needed  - Assess and instruct to report SOB or any respiratory difficulty  - Respiratory Therapy support as indicated  Outcome: Progressing     Problem: GENITOURINARY - ADULT  Goal: Maintains or returns to baseline urinary function  Description: INTERVENTIONS:  - Assess urinary function  - Encourage oral fluids to ensure adequate hydration if ordered  - Administer IV fluids as ordered to ensure adequate hydration  - Administer ordered medications as needed  - Offer frequent toileting  - Follow urinary retention protocol if ordered  Outcome: Progressing  Goal: Absence of urinary retention  Description: INTERVENTIONS:  - Assess patient’s ability to void and empty bladder  - Monitor I/O  - Bladder scan as needed  - Discuss with physician/AP medications to alleviate retention as needed  - Discuss catheterization for long term situations as appropriate  Outcome: Progressing  Goal: Urinary catheter remains patent  Description: INTERVENTIONS:  - Assess patency of urinary catheter  - If patient has a chronic vazquez, consider changing catheter if non-functioning  - Follow guidelines for intermittent irrigation of non-functioning urinary catheter  Outcome: Progressing     Problem: SKIN/TISSUE INTEGRITY - ADULT  Goal: Skin Integrity remains intact(Skin Breakdown Prevention)  Description: Assess:  -Perform Henry assessment every shift  -Clean and moisturize skin every shift  -Inspect skin when repositioning, toileting, and assisting with ADLS  -Assess under medical devices such as IV sites every shift  -Assess extremities for adequate circulation and sensation     Bed Management:  -Have minimal linens on bed & keep smooth, unwrinkled  -Change linens as needed when moist or perspiring  -Avoid sitting or lying in one position for more than 2 hours while in bed  -Keep HOB at 30 degrees     Toileting:  -Offer bedside commode  -Assess for incontinence every shift  -Use incontinent care products after each incontinent episode such as barrier creams    Activity:  -Mobilize patient 3 times a day  -Encourage activity and walks on unit  -Encourage or provide ROM exercises   -Turn and reposition patient every 2 Hours  -Use appropriate equipment to lift or move patient in bed  -Instruct/ Assist with weight shifting every shift when out of bed in chair  -Consider limitation of chair time 2 hour intervals    Skin Care:  -Avoid use of baby powder, tape, friction and shearing, hot water or constrictive clothing  -Relieve pressure over bony prominences using waffle cushion  -Do not massage red bony areas    Next Steps:  -Teach patient strategies to minimize risks such as repositioning   -Consider consults to  interdisciplinary teams such as PT  Outcome: Progressing  Goal: Incision(s), wounds(s) or drain site(s) healing without S/S of infection  Description: INTERVENTIONS  - Assess and document dressing, incision, wound bed, drain sites and surrounding tissue  - Provide patient and family education  - Perform skin care/dressing changes every shift  Outcome: Progressing  Goal: Pressure injury heals and does not worsen  Description: Interventions:  - Implement low air loss mattress or specialty surface (Criteria met)  - Apply silicone foam dressing  - Instruct/assist with weight shifting every 30 minutes when in chair   - Limit chair time to 2 hour intervals  - Use special pressure reducing interventions such as waffle cushion when in chair   - Apply fecal or urinary incontinence containment device   - Perform passive or active ROM every shift  - Turn and reposition patient & offload bony prominences every 2 hours   - Utilize friction reducing device or surface for transfers   - Consider consults to  interdisciplinary teams such as PT  - Use incontinent care products after each incontinent episode such as barrier creams  - Consider nutrition services referral as needed  Outcome: Progressing     Problem: SAFETY ADULT  Goal: Patient will remain free of falls  Description: INTERVENTIONS:  - Educate patient/family on patient safety including physical limitations  - Instruct patient to call for assistance with activity   - Consult OT/PT to assist with strengthening/mobility   - Keep Call bell within reach  - Keep bed low and locked with side rails adjusted as appropriate  - Keep care items and personal belongings within reach  - Initiate and maintain comfort rounds  - Make Fall Risk Sign visible to staff  - Offer Toileting every 2 Hours, in advance of need  - Initiate/Maintain bed alarm  - Obtain necessary fall risk management equipment: yellow socks  - Apply yellow socks and bracelet for high fall risk patients  - Consider moving patient to room near nurses station  Outcome: Progressing  Goal: Maintain or return to baseline ADL function  Description: INTERVENTIONS:  -  Assess patient's ability to carry out ADLs; assess patient's baseline for ADL function and identify physical deficits which impact ability to perform ADLs (bathing, care of mouth/teeth, toileting, grooming, dressing, etc.)  - Assess/evaluate cause of self-care deficits   - Assess range of motion  - Assess patient's mobility; develop plan if impaired  - Assess patient's need for assistive devices and provide as appropriate  - Encourage maximum independence but intervene and supervise when necessary  - Involve family in performance of ADLs  - Assess for home care needs following discharge   - Consider OT consult to assist with ADL evaluation and planning for discharge  - Provide patient education as appropriate  Outcome: Progressing  Goal: Maintains/Returns to pre admission functional level  Description: INTERVENTIONS:  - Perform BMAT or MOVE assessment daily.   - Set and communicate daily mobility goal to care team and patient/family/caregiver. - Collaborate with rehabilitation services on mobility goals if consulted  - Perform Range of Motion 3 times a day. - Reposition patient every 2 hours.   - Dangle patient 3 times a day  - Stand patient 3 times a day  - Ambulate patient 3 times a day  - Out of bed to chair 3 times a day   - Out of bed for meals 3 times a day  - Out of bed for toileting  - Record patient progress and toleration of activity level   Outcome: Progressing     Problem: SAFETY,RESTRAINT: NV/NON-SELF DESTRUCTIVE BEHAVIOR  Goal: Remains free of harm/injury (restraint for non violent/non self-detsructive behavior)  Description: INTERVENTIONS:  - Instruct patient/family regarding restraint use   - Assess and monitor physiologic and psychological status   - Provide interventions and comfort measures to meet assessed patient needs   - Identify and implement measures to help patient regain control  - Assess readiness for release of restraint   Outcome: Progressing  Goal: Returns to optimal restraint-free functioning  Description: INTERVENTIONS:  - Assess the patient's behavior and symptoms that indicate continued need for restraint  - Identify and implement measures to help patient regain control  - Assess readiness for release of restraint   Outcome: Progressing     Problem: Nutrition/Hydration-ADULT  Goal: Nutrient/Hydration intake appropriate for improving, restoring or maintaining nutritional needs  Description: Monitor and assess patient's nutrition/hydration status for malnutrition. Collaborate with interdisciplinary team and initiate plan and interventions as ordered. Monitor patient's weight and dietary intake as ordered or per policy. Utilize nutrition screening tool and intervene as necessary. Determine patient's food preferences and provide high-protein, high-caloric foods as appropriate.      INTERVENTIONS:  - Monitor oral intake, urinary output, labs, and treatment plans  - Assess nutrition and hydration status and recommend course of action  - Evaluate amount of meals eaten  - Assist patient with eating if necessary   - Allow adequate time for meals  - Recommend/ encourage appropriate diets, oral nutritional supplements, and vitamin/mineral supplements  - Order, calculate, and assess calorie counts as needed  - Recommend, monitor, and adjust tube feedings and TPN/PPN based on assessed needs  - Assess need for intravenous fluids  - Provide specific nutrition/hydration education as appropriate  - Include patient/family/caregiver in decisions related to nutrition  Outcome: Progressing     Problem: MOBILITY - ADULT  Goal: Maintain or return to baseline ADL function  Description: INTERVENTIONS:  -  Assess patient's ability to carry out ADLs; assess patient's baseline for ADL function and identify physical deficits which impact ability to perform ADLs (bathing, care of mouth/teeth, toileting, grooming, dressing, etc.)  - Assess/evaluate cause of self-care deficits   - Assess range of motion  - Assess patient's mobility; develop plan if impaired  - Assess patient's need for assistive devices and provide as appropriate  - Encourage maximum independence but intervene and supervise when necessary  - Involve family in performance of ADLs  - Assess for home care needs following discharge   - Consider OT consult to assist with ADL evaluation and planning for discharge  - Provide patient education as appropriate  Outcome: Progressing  Goal: Maintains/Returns to pre admission functional level  Description: INTERVENTIONS:  - Perform BMAT or MOVE assessment daily.   - Set and communicate daily mobility goal to care team and patient/family/caregiver. - Collaborate with rehabilitation services on mobility goals if consulted  - Perform Range of Motion 3 times a day. - Reposition patient every 2 hours.   - Dangle patient 3 times a day  - Stand patient 3 times a day  - Ambulate patient 3 times a day  - Out of bed to chair 3 times a day   - Out of bed for meals 3 times a day  - Out of bed for toileting  - Record patient progress and toleration of activity level   Outcome: Progressing

## 2023-09-30 NOTE — ASSESSMENT & PLAN NOTE
Prior to admission patient had a stable history of COPD. Throughout admission patient has not had any increased oxygenation need or wheezing on exam.  His home medication regimen consist of Pulmicort and DuoNebs.     · Continue home medications

## 2023-10-01 LAB
ANION GAP SERPL CALCULATED.3IONS-SCNC: 5 MMOL/L
BACTERIA UR CULT: ABNORMAL
BASOPHILS # BLD AUTO: 0.07 THOUSANDS/ÂΜL (ref 0–0.1)
BASOPHILS NFR BLD AUTO: 1 % (ref 0–1)
BUN SERPL-MCNC: 52 MG/DL (ref 5–25)
CALCIUM SERPL-MCNC: 9.2 MG/DL (ref 8.4–10.2)
CHLORIDE SERPL-SCNC: 106 MMOL/L (ref 96–108)
CO2 SERPL-SCNC: 26 MMOL/L (ref 21–32)
CREAT SERPL-MCNC: 2.33 MG/DL (ref 0.6–1.3)
EOSINOPHIL # BLD AUTO: 0.17 THOUSAND/ÂΜL (ref 0–0.61)
EOSINOPHIL NFR BLD AUTO: 1 % (ref 0–6)
ERYTHROCYTE [DISTWIDTH] IN BLOOD BY AUTOMATED COUNT: 16.4 % (ref 11.6–15.1)
GFR SERPL CREATININE-BSD FRML MDRD: 24 ML/MIN/1.73SQ M
GLUCOSE SERPL-MCNC: 154 MG/DL (ref 65–140)
GLUCOSE SERPL-MCNC: 155 MG/DL (ref 65–140)
GLUCOSE SERPL-MCNC: 160 MG/DL (ref 65–140)
GLUCOSE SERPL-MCNC: 166 MG/DL (ref 65–140)
GLUCOSE SERPL-MCNC: 179 MG/DL (ref 65–140)
GLUCOSE SERPL-MCNC: 184 MG/DL (ref 65–140)
HCT VFR BLD AUTO: 30.6 % (ref 36.5–49.3)
HGB BLD-MCNC: 9.6 G/DL (ref 12–17)
IMM GRANULOCYTES # BLD AUTO: 0.05 THOUSAND/UL (ref 0–0.2)
IMM GRANULOCYTES NFR BLD AUTO: 0 % (ref 0–2)
LYMPHOCYTES # BLD AUTO: 1.6 THOUSANDS/ÂΜL (ref 0.6–4.47)
LYMPHOCYTES NFR BLD AUTO: 11 % (ref 14–44)
MAGNESIUM SERPL-MCNC: 2.1 MG/DL (ref 1.9–2.7)
MCH RBC QN AUTO: 27.1 PG (ref 26.8–34.3)
MCHC RBC AUTO-ENTMCNC: 31.4 G/DL (ref 31.4–37.4)
MCV RBC AUTO: 86 FL (ref 82–98)
MONOCYTES # BLD AUTO: 1.11 THOUSAND/ÂΜL (ref 0.17–1.22)
MONOCYTES NFR BLD AUTO: 8 % (ref 4–12)
NEUTROPHILS # BLD AUTO: 11.69 THOUSANDS/ÂΜL (ref 1.85–7.62)
NEUTS SEG NFR BLD AUTO: 79 % (ref 43–75)
NRBC BLD AUTO-RTO: 0 /100 WBCS
PLATELET # BLD AUTO: 245 THOUSANDS/UL (ref 149–390)
PMV BLD AUTO: 9.3 FL (ref 8.9–12.7)
POTASSIUM SERPL-SCNC: 3.9 MMOL/L (ref 3.5–5.3)
RBC # BLD AUTO: 3.54 MILLION/UL (ref 3.88–5.62)
SODIUM SERPL-SCNC: 137 MMOL/L (ref 135–147)
WBC # BLD AUTO: 14.69 THOUSAND/UL (ref 4.31–10.16)

## 2023-10-01 PROCEDURE — 82948 REAGENT STRIP/BLOOD GLUCOSE: CPT

## 2023-10-01 PROCEDURE — 97163 PT EVAL HIGH COMPLEX 45 MIN: CPT | Performed by: PHYSICAL THERAPIST

## 2023-10-01 PROCEDURE — 94760 N-INVAS EAR/PLS OXIMETRY 1: CPT

## 2023-10-01 PROCEDURE — 97167 OT EVAL HIGH COMPLEX 60 MIN: CPT

## 2023-10-01 PROCEDURE — 80048 BASIC METABOLIC PNL TOTAL CA: CPT

## 2023-10-01 PROCEDURE — 85025 COMPLETE CBC W/AUTO DIFF WBC: CPT

## 2023-10-01 PROCEDURE — 94640 AIRWAY INHALATION TREATMENT: CPT

## 2023-10-01 PROCEDURE — 99232 SBSQ HOSP IP/OBS MODERATE 35: CPT | Performed by: INTERNAL MEDICINE

## 2023-10-01 PROCEDURE — 83735 ASSAY OF MAGNESIUM: CPT

## 2023-10-01 RX ORDER — CEFEPIME HYDROCHLORIDE 1 G/50ML
1000 INJECTION, SOLUTION INTRAVENOUS EVERY 12 HOURS
Status: DISCONTINUED | OUTPATIENT
Start: 2023-10-01 | End: 2023-10-03

## 2023-10-01 RX ADMIN — LABETALOL HYDROCHLORIDE 200 MG: 100 TABLET, FILM COATED ORAL at 14:48

## 2023-10-01 RX ADMIN — BUDESONIDE 0.25 MG: 0.25 INHALANT ORAL at 19:51

## 2023-10-01 RX ADMIN — NYSTATIN: 100000 POWDER TOPICAL at 09:36

## 2023-10-01 RX ADMIN — LABETALOL HYDROCHLORIDE 200 MG: 100 TABLET, FILM COATED ORAL at 05:59

## 2023-10-01 RX ADMIN — CEFEPIME HYDROCHLORIDE 1000 MG: 1 INJECTION, SOLUTION INTRAVENOUS at 10:32

## 2023-10-01 RX ADMIN — CEFEPIME HYDROCHLORIDE 1000 MG: 1 INJECTION, SOLUTION INTRAVENOUS at 22:00

## 2023-10-01 RX ADMIN — LABETALOL HYDROCHLORIDE 200 MG: 100 TABLET, FILM COATED ORAL at 21:42

## 2023-10-01 RX ADMIN — Medication 6 MG: at 21:43

## 2023-10-01 RX ADMIN — IPRATROPIUM BROMIDE AND ALBUTEROL SULFATE 3 ML: 2.5; .5 SOLUTION RESPIRATORY (INHALATION) at 14:45

## 2023-10-01 RX ADMIN — OXYBUTYNIN CHLORIDE 2.5 MG: 5 TABLET ORAL at 09:35

## 2023-10-01 RX ADMIN — Medication 250 MG: at 09:35

## 2023-10-01 RX ADMIN — FOLIC ACID 1 MG: 1 TABLET ORAL at 09:35

## 2023-10-01 RX ADMIN — LAMOTRIGINE 25 MG: 25 TABLET ORAL at 18:38

## 2023-10-01 RX ADMIN — NYSTATIN: 100000 POWDER TOPICAL at 21:44

## 2023-10-01 RX ADMIN — IPRATROPIUM BROMIDE AND ALBUTEROL SULFATE 3 ML: 2.5; .5 SOLUTION RESPIRATORY (INHALATION) at 07:05

## 2023-10-01 RX ADMIN — LAMOTRIGINE 25 MG: 25 TABLET ORAL at 09:35

## 2023-10-01 RX ADMIN — CARBIDOPA AND LEVODOPA 1 TABLET: 25; 100 TABLET ORAL at 16:53

## 2023-10-01 RX ADMIN — CARBIDOPA AND LEVODOPA 1 TABLET: 25; 100 TABLET ORAL at 21:43

## 2023-10-01 RX ADMIN — IPRATROPIUM BROMIDE AND ALBUTEROL SULFATE 3 ML: 2.5; .5 SOLUTION RESPIRATORY (INHALATION) at 19:51

## 2023-10-01 RX ADMIN — OXYBUTYNIN CHLORIDE 2.5 MG: 5 TABLET ORAL at 18:38

## 2023-10-01 RX ADMIN — CARBIDOPA AND LEVODOPA 1 TABLET: 25; 100 TABLET ORAL at 09:35

## 2023-10-01 RX ADMIN — BUDESONIDE 0.25 MG: 0.25 INHALANT ORAL at 07:04

## 2023-10-01 RX ADMIN — ACETAMINOPHEN 650 MG: 325 TABLET ORAL at 12:38

## 2023-10-01 RX ADMIN — SENNOSIDES 8.8 MG: 8.8 SYRUP ORAL at 21:44

## 2023-10-01 RX ADMIN — NYSTATIN: 100000 POWDER TOPICAL at 16:54

## 2023-10-01 RX ADMIN — LATANOPROST 1 DROP: 50 SOLUTION OPHTHALMIC at 21:45

## 2023-10-01 RX ADMIN — SERTRALINE HYDROCHLORIDE 100 MG: 100 TABLET ORAL at 09:35

## 2023-10-01 RX ADMIN — AMLODIPINE BESYLATE 10 MG: 10 TABLET ORAL at 09:35

## 2023-10-01 NOTE — QUICK NOTE
Received Tiger text from nurse for possible muffled sounds. Patient's daughter was concerned that her her father was not able to speak. The patient was reassessed by me and Dr. Jenny Collins, and the patient was speaking in clear sentences. Patient was responding to our questions clearly.   No other acute concerns at this time      Albania Gomez

## 2023-10-01 NOTE — OCCUPATIONAL THERAPY NOTE
Occupational Therapy Evaluation       10/01/23 0901   Note Type   Note type Evaluation   Pain Assessment   Pain Assessment Tool 0-10   Pain Score No Pain   Restrictions/Precautions   Other Precautions Cognitive; Chair Alarm; Bed Alarm; Fall Risk  (Bilateral soft wrist restraints)   Home Living   Additional Comments Patient is a poor historian; hx dementia and minimal verbalization throughout session; Per chart, patient picked up from home via squad; Bourne pad underneath patient upon arrival; Patient home after recently being discharged from [facility]   Prior Function   Level of Grand   (Unclear; pt reports staying in bed and going to bathroom in the bed)   Lives With [facility] Help From Family   IADLs Family/Friend/Other provides transportation; Family/Friend/Other provides meals; Family/Friend/Other provides medication management   Comments Patient reports 'family' when asking who cares for him at home   General   Additional Pertinent History Patient presented to hospital from home via squad for blood in urine   ADL   Eating Assistance 2  Maximal Assistance   Grooming Assistance 1  Total Assistance   Grooming Deficit Wash/dry hands; Wash/dry face  (Hand over hand assist to complete with washcloth)   UB Bathing Assistance 1  Total Assistance   LB Bathing Assistance 1  Total Assistance   [address] [facility] 1  Total Assistance   LB Dressing Assistance 1  Total [address] [place] [address]  1  Total Assistance   Toileting Deficit   (Lieberman catheter)   Bed Mobility   Rolling R 1  Dependent   Rolling L 1  Dependent   Additional Comments Patient lethargic; little active movement on evaluation; Roll to left and right with total assist; unable to complete any further mobility due to dependent status; Unsure of patient's baseline functional status   Activity Tolerance   Activity Tolerance Patient limited by fatigue; Other (Comment)  (Limited by cognition, limited by weakness)   Medical Staff Made Aware '   RUE Assessment   RUE Assessment   (Minimal active movement; PROM WFL; patient with poor cooperation with formal MMT)   LUE Assessment   LUE Assessment   (Minimal active movement; PROM WFL; patient with poor cooperation with formal MMT)   Cognition   Overall Cognitive Status Impaired   Arousal/Participation Lethargic   Attention Difficulty attending to directions   Orientation Level Oriented to person;Oriented to place; Disoriented to time;Disoriented to situation   Following Commands Follows one step commands inconsistently   Comments Patient lethargic, arousable with increased verbal stimuli; Minimall verbalization throughout session; follows one step commands less than 50% of the time; Poor attention to task, poor problem solving skills, little to no insight into current functional limitations; Patient unable to participate in formal cognitive assessment today due to lethargy and minimal active participation; Per chart patient with baseline dementia; Unsure of appropriateness of OT cognitive eval at this time due to preexisting dementia diagnosis; outcomes of cognitive eval unlikely to influence current discharge recommendation   Assessment   Limitation Decreased ADL status; Decreased UE ROM; Decreased UE strength;Decreased Safe judgement during ADL;Decreased cognition;Decreased endurance;Decreased self-care trans;Decreased high-level ADLs   Prognosis Fair   Assessment Patient evaluated by Occupational Therapy. Patient admitted with Gross hematuria. The patients occupational profile, medical and therapy history includes a extensive additional review of physical, cognitive, or psychosocial history related to current functional performance. Comorbidities affecting functional mobility and ADLS include: Diabetes, Parkinson;s, HTN, CKD, dementia, dysphagia, COPD, GERD, depression BPH, prostate cancer.   Prior to admission, patient was requiring assist for functional mobility with unknown AD, requiring assist for ADLS, requiring assist for IADLS and home with family assist.  The evaluation identifies the following performance deficits: weakness, impaired balance, decreased endurance, increased fall risk, new onset of impairment of functional mobility, decreased ADLS, decreased IADLS, decreased activity tolerance, decreased safety awareness, impaired judgement, decreased cognition and decreased strength, that result in activity limitations and/or participation restrictions. This evaluation requires clinical decision making of high complexity, because the patient presents with comorbidites that affect occupational performance and required significant modification of tasks or assistance with consideration of multiple treatment options. The Barthel Index was used as a functional outcome tool presenting with a score of Barthel Index Score: 0, indicating marked limitations of functional mobility and ADLS. The patient's raw score on the -PAC Daily Activity Inpatient Short Form is 7. A raw score of less than 19 suggests the patient may benefit from discharge to post-acute rehabilitation services. Please refer to the recommendation of the Occupational Therapist for safe discharge planning. Patient will benefit from skilled Occupational Therapy services to address above deficits and facilitate a safe return to prior level of function. Goals   Patient Goals Unable to state due to impaired cognition   STG Time Frame   (1-7 days)   Short Term Goal  Eating: mod assist; Grooming: max assist seated; Bathing: max assist; Upper Body Dressing max assist; Lower Body Dressing: max assist; Toileting: max assist; Patient will increase stand pivot commode transfer to max assist of 2 with rolling walker to increase performance and safety with ADLS and functional mobility;  Patient will increase sitting tolerance to 5 minutes during ADL task to decrease assistance level and decrease fall risk; Patient will increase bed mobility to max assist of 2 in preparation for ADLS and transfers; Patient will tolerate 5 minutes of UE ROM/strengthening to increase general activity tolerance and performance in ADLS/IADLS; Patient will improve functional activity tolerance to 5 minutes of sustained functional tasks to increase participation in basic self-care and decrease assistance level;  Patient will be able to to verbalize understanding and perform energy conservation/proper body mechanics during ADLS and functional mobility at least 25% of the time with maximal cueing to decrease signs of fatigue and increase stamina to return to prior level of function; Patient will increase static/dynamic sitting balance to fair- to improve the ability to sit at edge of bed or on a chair for ADLS;   LTG Time Frame   (8-14 days)   Long Term Goal Eating: min assist; Grooming: mod assist seated; Bathing: mod assist; Upper Body Dressing mod assist; Lower Body Dressing: mod assist; Toileting: mod assist; Patient will increase stand pivot commode transfer to mod assist of 2 with rolling walker to increase performance and safety with ADLS and functional mobility; Patient will increase sitting tolerance to 10 minutes during ADL task to decrease assistance level and decrease fall risk; Patient will increase bed mobility to mod assist of 2 in preparation for ADLS and transfers;  Patient will tolerate 10 minutes of UE ROM/strengthening to increase general activity tolerance and performance in ADLS/IADLS; Patient will improve functional activity tolerance to 10 minutes of sustained functional tasks to increase participation in basic self-care and decrease assistance level;  Patient will be able to to verbalize understanding and perform energy conservation/proper body mechanics during ADLS and functional mobility at least 50% of the time with moderate cueing to decrease signs of fatigue and increase stamina to return to prior level of function; Patient will increase static/dynamic sitting balance to fair to improve the ability to sit at edge of bed or on a chair for ADLS;  Patient will increase static/dynamic standing balance to fair- to improve postural stability and decrease fall risk during standing ADLS and transfers. Pt will score >/= 12/24 on AM-PAC Daily Activity Inpatient scale to promote safe independence with ADLs and functional mobility; Pt will score >/= 30/100 on Barthel Index in order to decrease caregiver assistance needed and increase ability to perform ADLs and functional mobility. Plan   Treatment Interventions ADL retraining;Functional transfer training;UE strengthening/ROM; Endurance training;Cognitive reorientation;Patient/family training;Equipment evaluation/education; Compensatory technique education;Continued evaluation; Energy conservation; Activityengagement   Goal Expiration Date 10/15/23   OT Frequency 2-3x/wk   Recommendation   OT Discharge Recommendation Post acute rehabilitation services   [facility]   Lower Body Dressing 1   Bathing 1   Toileting 1   Upper Body Dressing 1   Grooming 1   Eating 2   Daily Activity Raw Score 7   Turning Head Towards Sound 2   Follow Simple Instructions 2   Low Function Daily Activity Raw Score 11   Low Function Daily Activity Standardized Score  19.45   AM-[facility] Applied Cognition Inpatient   Following a Speech/Presentation 1   Understanding Ordinary Conversation 2   Taking Medications 1   Remembering Where Things Are Placed or Put Away 2   Remembering List of 4-5 Errands 1   Taking Care of Complicated Tasks 1   Applied Cognition Raw Score 8   Applied Cognition Standardized Score 19.32   Barthel Index   Feeding 0   Bathing 0   Grooming Score 0   Dressing Score 0   Bladder Score 0   Bowels Score 0   Toilet Use Score 0   Transfers (Bed/Chair) Score 0   Mobility (Level Surface) Score 0   Stairs Score 0   Barthel Index Score 0   Licensure   [place] License Number  Daniella CARLOSR/JANI [id number]

## 2023-10-01 NOTE — PHYSICAL THERAPY NOTE
PT EVALUATION     Time In: 0900  Time Out: 0910       10/01/23 0910   PT Last Visit   PT Visit Date 10/01/23   Note Type   Note type Evaluation   Pain Assessment   Pain Assessment Tool 0-10   Pain Location/Orientation Location: Leg   Hospital Pain Intervention(s) Repositioned; Emotional support; Ambulation/increased activity   Restrictions/Precautions   Other Precautions Restraints; Chair Alarm; Bed Alarm;Cognitive; Fall Risk;Pain   Home Living   Additional Comments Patient poor historian, unable to obtain home set-up and other details. Follow-up with family for details as able. Prior Function   Level of [place]   (Patient reports he goes to bathroom in bed when asked about toileting.)   Lives With [facility] Help From Family   IADLs Family/Friend/Other provides transportation; Family/Friend/Other provides meals; Family/Friend/Other provides medication management   Vocational Retired   Comments Patient reports primarily bedbound at this time. General   Additional Pertinent History Pt admitted to ER secondary to blood in urine from [place]. Patient recently discharged from nursing home.  Patient denying pain from [place] upon arrival to hospital   Cognition   Overall Cognitive Status Unable to assess   Arousal/Participation Lethargic   Orientation Level Oriented to person;Disoriented to time;Disoriented to situation  (Patient able to articulate he is in a hospital, unable to state which hospital.)   Memory Unable to assess   Following Commands Follows one step commands inconsistently   RLE Assessment   RLE Assessment   (Patient demonstrating trace movements for ankle DF/PF, knee flexion/ extension, and hip flexion)   LLE Assessment   LLE Assessment   (Patient demonstrating trace movements for ankle DF/PF, knee flexion/ extension, and hip flexion)   Bed Mobility   Rolling R 1  Dependent   Rolling L 1  Dependent   Additional Comments Patient with limited participation today, dependent for rolling, unable to assess additional bed mobility, transfers, or gait, assess as able next session. Transfers   Sit to Stand Unable to assess   Stand to Sit Unable to assess   Additional Comments Patient with limited participation today, dependent for rolling, unable to assess additional bed mobility, transfers, or gait, assess as able next session. Ambulation/Elevation   Gait pattern Not appropriate   Balance   Static Sitting Zero   Activity Tolerance   Activity Tolerance Patient limited by fatigue;Patient limited by pain   Medical Staff Made Aware CARLOS Davis   Nurse Made Aware WENDIE Saul   Assessment   Problem List Decreased strength;Decreased endurance; Impaired balance;Decreased mobility; Decreased coordination;Decreased skin integrity;Pain   Assessment Patient seen for Physical Therapy evaluation. Patient admitted with Gross hematuria. Comorbidities affecting patient's physical performance include: CKD, COPD, dementia, depression, dysphagia, GERD, hypertension, Parkinsons and UTI. Personal factors affecting patient at time of initial evaluation include: communication issues, inability to ambulate household distances, inability to navigate community distances, decreased cognition, limited home support, decreased initiation and engagement, depression, inability to perform ADLS and inability to perform IADLS . Prior to admission, patient was dependent for mobility, requiring assist for ADLS and requiring assist for IADLS. Please find objective findings from Physical Therapy assessment regarding body systems outlined above with impairments and limitations including weakness, impaired balance, decreased endurance, impaired coordination, gait deviations, pain, decreased activity tolerance, decreased functional mobility tolerance, decreased safety awareness, impaired judgement, fall risk, decreased skin integrity and decreased cognition.   The Barthel Index was used as a functional outcome tool presenting with a score of Barthel Index Score: 0 today indicating marked limitations of functional mobility and ADLS. Patient's clinical presentation is currently unstable/unpredictable as seen in patient's presentation of changing level of pain, varying levels of cognitive performance, increased fall risk, new onset of impairment of functional mobility, decreased endurance and new onset of weakness. Pt would benefit from continued Physical Therapy treatment to address deficits as defined above and maximize level of functional mobility. As demonstrated by objective findings, the assigned level of complexity for this evaluation is high. The patient's [facility] Basic Mobility Inpatient Short Form Raw Score is 6. A Raw score of less than or equal to 16 suggests the patient may benefit from discharge to post-acute rehabilitation services. Please also refer to the recommendation of the Physical Therapist for safe discharge planning. Goals   STG Expiration Date 10/08/23   Short Term Goal #1 Short-term goals: Pt will improve strength by 1/2 grade in bilateral LE;  Pt will improve bed mobility to max A x 1 in order to improve function; pt will improve all transfers to max A x 1 ;   LTG Expiration Date 10/15/23   Long Term Goal #1 Long-term goals: Patient will improve all transfers to mod A x 1 demonstrating safe and appropriate technique in order to improve ability to negotiate safely in home environment;  Patient will be able to pivot transfer with least restrictive AD  Max A x 1 in order to demonstrate ability to transfer to commode. Patient will improve AMPAC score to 9/24 or better to demonstrate improved functional independence. Plan   Treatment/Interventions ADL retraining;Functional transfer training;LE strengthening/ROM; Therapeutic exercise; Endurance training;Patient/family training;Bed mobility;Gait training;Spoke to nursing;OT   PT Frequency 3-5x/wk   Recommendation   PT Discharge Recommendation Post acute rehabilitation services   [facility] Basic Mobility Inpatient   Turning in Flat Bed Without Bedrails 1   Lying on Back to Sitting on Edge of Flat Bed Without Bedrails 1   Moving Bed to Chair 1   Standing Up From Chair Using Arms 1   Walk in Room 1   Climb 3-5 Stairs With Railing 1   Basic Mobility Inpatient Raw Score 6   Turning Head Towards Sound 2   Follow Simple Instructions 2   Low Function Basic Mobility Raw Score  10   Low Function Basic Mobility Standardized Score  14.65   Highest Level Of Mobility   -[facility] Goal 2: Bed activities/Dependent transfer   -[facility] Achieved 2: Bed activities/Dependent transfer   Barthel Index   Feeding 0   Bathing 0   Grooming Score 0   Dressing Score 0   Bladder Score 0   Bowels Score 0   Toilet Use Score 0   Transfers (Bed/Chair) Score 0   Mobility (Level Surface) Score 0   Stairs Score 0   Barthel Index Score 0   End of Consult   Patient Position at End of Consult Supine; All needs within reach  (Restraints reapplied)   Licensure   [place] License Number  Honeyia Rosanna PT, DPT [id number]

## 2023-10-01 NOTE — PLAN OF CARE
Problem: Potential for Falls  Goal: Patient will remain free of falls  Description: INTERVENTIONS:  - Educate patient/family on patient safety including physical limitations  - Instruct patient to call for assistance with activity   - Consult OT/PT to assist with strengthening/mobility   - Keep Call bell within reach  - Keep bed low and locked with side rails adjusted as appropriate  - Keep care items and personal belongings within reach  - Initiate and maintain comfort rounds  - Make Fall Risk Sign visible to staff  - Offer Toileting every 2 Hours, in advance of need  - Initiate/Maintain bed alarm  - Obtain necessary fall risk management equipment: bed alarm, yellow socks   - Apply yellow socks and bracelet for high fall risk patients  - Consider moving patient to room near nurses station  Outcome: Progressing     Problem: RESPIRATORY - ADULT  Goal: Achieves optimal ventilation and oxygenation  Description: INTERVENTIONS:  - Assess for changes in respiratory status  - Assess for changes in mentation and behavior  - Position to facilitate oxygenation and minimize respiratory effort  - Oxygen administered by appropriate delivery if ordered  - Initiate smoking cessation education as indicated  - Encourage broncho-pulmonary hygiene including cough, deep breathe, Incentive Spirometry  - Assess the need for suctioning and aspirate as needed  - Assess and instruct to report SOB or any respiratory difficulty  - Respiratory Therapy support as indicated  Outcome: Progressing     Problem: SAFETY ADULT  Goal: Patient will remain free of falls  Description: INTERVENTIONS:  - Educate patient/family on patient safety including physical limitations  - Instruct patient to call for assistance with activity   - Consult OT/PT to assist with strengthening/mobility   - Keep Call bell within reach  - Keep bed low and locked with side rails adjusted as appropriate  - Keep care items and personal belongings within reach  - Initiate and maintain comfort rounds  - Make Fall Risk Sign visible to staff  - Offer Toileting every 2 Hours, in advance of need  - Initiate/Maintain bed alarm  - Obtain necessary fall risk management equipment: bed alarm, yellow socks   - Apply yellow socks and bracelet for high fall risk patients  - Consider moving patient to room near nurses station  Outcome: Progressing  Goal: Maintain or return to baseline ADL function  Description: INTERVENTIONS:  -  Assess patient's ability to carry out ADLs; assess patient's baseline for ADL function and identify physical deficits which impact ability to perform ADLs (bathing, care of mouth/teeth, toileting, grooming, dressing, etc.)  - Assess/evaluate cause of self-care deficits   - Assess range of motion  - Assess patient's mobility; develop plan if impaired  - Assess patient's need for assistive devices and provide as appropriate  - Encourage maximum independence but intervene and supervise when necessary  - Involve family in performance of ADLs  - Assess for home care needs following discharge   - Consider OT consult to assist with ADL evaluation and planning for discharge  - Provide patient education as appropriate  Outcome: Progressing  Goal: Maintains/Returns to pre admission functional level  Description: INTERVENTIONS:  - Perform BMAT or MOVE assessment daily.   - Set and communicate daily mobility goal to care team and patient/family/caregiver. - Collaborate with rehabilitation services on mobility goals if consulted  - Perform Range of Motion 3 times a day. - Reposition patient every 2 hours.   - Dangle patient 3 times a day  - Stand patient 3 times a day  - Ambulate patient 3 times a day  - Out of bed to chair 3 times a day   - Out of bed for meals 3 times a day  - Out of bed for toileting  - Record patient progress and toleration of activity level   Outcome: Progressing     Problem: GENITOURINARY - ADULT  Goal: Maintains or returns to baseline urinary function  Description: INTERVENTIONS:  - Assess urinary function  - Encourage oral fluids to ensure adequate hydration if ordered  - Administer IV fluids as ordered to ensure adequate hydration  - Administer ordered medications as needed  - Offer frequent toileting  - Follow urinary retention protocol if ordered  Outcome: Progressing  Goal: Absence of urinary retention  Description: INTERVENTIONS:  - Assess patient’s ability to void and empty bladder  - Monitor I/O  - Bladder scan as needed  - Discuss with physician/AP medications to alleviate retention as needed  - Discuss catheterization for long term situations as appropriate  Outcome: Progressing  Goal: Urinary catheter remains patent  Description: INTERVENTIONS:  - Assess patency of urinary catheter  - If patient has a chronic vazquez, consider changing catheter if non-functioning  - Follow guidelines for intermittent irrigation of non-functioning urinary catheter  Outcome: Progressing     Problem: SKIN/TISSUE INTEGRITY - ADULT  Goal: Skin Integrity remains intact(Skin Breakdown Prevention)  Description: Assess:  -Perform Henry assessment every 8 hours   -Clean and moisturize skin every 8 hours   -Inspect skin when repositioning, toileting, and assisting with ADLS  -Assess extremities for adequate circulation and sensation     Bed Management:  -Have minimal linens on bed & keep smooth, unwrinkled  -Change linens as needed when moist or perspiring  -Avoid sitting or lying in one position for more than 2 hours while in bed  -Keep HOB at 45 degrees     Toileting:  -Offer bedside commode  -Assess for incontinence every 2 hours   -Use incontinent care products after each incontinent episode such as wipes, foams, pads     Activity:  -Mobilize patient 3 times a day  -Encourage activity and walks on unit  -Encourage or provide ROM exercises   -Turn and reposition patient every 2 Hours  -Use appropriate equipment to lift or move patient in bed  -Instruct/ Assist with weight shifting every 3 when out of bed in chair  -Consider limitation of chair time 2 hour intervals    Skin Care:  -Avoid use of baby powder, tape, friction and shearing, hot water or constrictive clothing  -Relieve pressure over bony prominences using cushions, allyvyn   -Do not massage red bony areas    Next Steps:  -Teach patient strategies to minimize risks such as weight shifting    -Consider consults to  interdisciplinary teams such as internal med   Outcome: Progressing  Goal: Incision(s), wounds(s) or drain site(s) healing without S/S of infection  Description: INTERVENTIONS  - Assess and document dressing, incision, wound bed, drain sites and surrounding tissue  - Provide patient and family education  - Perform skin care/dressing changes every 8 hours   Outcome: Progressing  Goal: Pressure injury heals and does not worsen  Description: Interventions:  - Implement low air loss mattress or specialty surface (Criteria met)  - Apply silicone foam dressing  - Instruct/assist with weight shifting every 60  minutes when in chair   - Limit chair time to 2 hour intervals  - Use special pressure reducing interventions such as weight shifting  when in chair   - Apply fecal or urinary incontinence containment device   - Perform passive or active ROM every 4 hours   - Turn and reposition patient & offload bony prominences every 2 hours   - Utilize friction reducing device or surface for transfers   - Consider consults to  interdisciplinary teams such as internal med  - Use incontinent care products after each incontinent episode such as wipes  - Consider nutrition services referral as needed  Outcome: Progressing     Problem: SAFETY,RESTRAINT: NV/NON-SELF DESTRUCTIVE BEHAVIOR  Goal: Remains free of harm/injury (restraint for non violent/non self-detsructive behavior)  Description: INTERVENTIONS:  - Instruct patient/family regarding restraint use   - Assess and monitor physiologic and psychological status   - Provide interventions and comfort measures to meet assessed patient needs   - Identify and implement measures to help patient regain control  - Assess readiness for release of restraint   Outcome: Progressing  Goal: Returns to optimal restraint-free functioning  Description: INTERVENTIONS:  - Assess the patient's behavior and symptoms that indicate continued need for restraint  - Identify and implement measures to help patient regain control  - Assess readiness for release of restraint   Outcome: Progressing     Problem: Nutrition/Hydration-ADULT  Goal: Nutrient/Hydration intake appropriate for improving, restoring or maintaining nutritional needs  Description: Monitor and assess patient's nutrition/hydration status for malnutrition. Collaborate with interdisciplinary team and initiate plan and interventions as ordered. Monitor patient's weight and dietary intake as ordered or per policy. Utilize nutrition screening tool and intervene as necessary. Determine patient's food preferences and provide high-protein, high-caloric foods as appropriate.      INTERVENTIONS:  - Monitor oral intake, urinary output, labs, and treatment plans  - Assess nutrition and hydration status and recommend course of action  - Evaluate amount of meals eaten  - Assist patient with eating if necessary   - Allow adequate time for meals  - Recommend/ encourage appropriate diets, oral nutritional supplements, and vitamin/mineral supplements  - Order, calculate, and assess calorie counts as needed  - Recommend, monitor, and adjust tube feedings and TPN/PPN based on assessed needs  - Assess need for intravenous fluids  - Provide specific nutrition/hydration education as appropriate  - Include patient/family/caregiver in decisions related to nutrition  Outcome: Progressing     Problem: MOBILITY - ADULT  Goal: Maintain or return to baseline ADL function  Description: INTERVENTIONS:  -  Assess patient's ability to carry out ADLs; assess patient's baseline for ADL function and identify physical deficits which impact ability to perform ADLs (bathing, care of mouth/teeth, toileting, grooming, dressing, etc.)  - Assess/evaluate cause of self-care deficits   - Assess range of motion  - Assess patient's mobility; develop plan if impaired  - Assess patient's need for assistive devices and provide as appropriate  - Encourage maximum independence but intervene and supervise when necessary  - Involve family in performance of ADLs  - Assess for home care needs following discharge   - Consider OT consult to assist with ADL evaluation and planning for discharge  - Provide patient education as appropriate  Outcome: Progressing  Goal: Maintains/Returns to pre admission functional level  Description: INTERVENTIONS:  - Perform BMAT or MOVE assessment daily.   - Set and communicate daily mobility goal to care team and patient/family/caregiver. - Collaborate with rehabilitation services on mobility goals if consulted  - Perform Range of Motion 3 times a day. - Reposition patient every 2 hours.   - Dangle patient 3 times a day  - Stand patient 3 times a day  - Ambulate patient 3 times a day  - Out of bed to chair 3 times a day   - Out of bed for meals 3 times a day  - Out of bed for toileting  - Record patient progress and toleration of activity level   Outcome: Progressing

## 2023-10-01 NOTE — CONSULTS
Nutrition Note:  Consult for tube feed rec's received and appreciated. Recommend Glucerna 1.2 @new goal rate of 75ml/hr x24hrs. This provides 1800ml, 2160kcals (25kcal/kg actual wt), 108g protein (1.2g/kg protein), 8.5g CHO/hr, 1449 free water from TF's. Recommend adjusting FWF to 100ml q4hr w/ rate increase (provides 2049ml total water w/ TF's). Monitor Na and adjust prn. Will continue to monitor and follow-up per acuity.

## 2023-10-01 NOTE — PROGRESS NOTES
Progress Note - Urology      Patient: Tamiko [place]   : 1940 Sex: male   MRN: [id number]     CSN: [id number]  Unit/Bed#: [address] [place] [address]     SUBJECTIVE:   Pt seen on morning rounds  No hematuria now pt must of   attempted to pull  Vazquez out at home /demenia      Objective   Vitals: /73   Pulse 78   Temp 98.6 °F (37 °C)   Resp 18   Ht 5' 11" (1.803 m)   Wt 86.2 kg (190 lb)   SpO2 99%   BMI 26.50 kg/m²     I/O last 24 hours:   In: 900 [I.V.:900]  Out: 1600 [Urine:1600]      Physical Exam:   General confused   grabbing vazquez  Normocephalic atraumatic PERRLA  Lungs clear bilaterally  Cardiac normal S1 normal S2  Abdomen soft, flank pain  Vazquez clear  Extremities no edema      Lab Results: CBC:   Lab Results   Component Value Date    WBC 14.69 (H) 10/01/2023    HGB 9.6 (L) 10/01/2023    HCT 30.6 (L) 10/01/2023    MCV 86 10/01/2023     10/01/2023    RBC 3.54 (L) 10/01/2023    MCH 27.1 10/01/2023    MCHC 31.4 10/01/2023    RDW 16.4 (H) 10/01/2023    MPV 9.3 10/01/2023    NRBC 0 10/01/2023     CMP:   Lab Results   Component Value Date     10/01/2023    CO2 26 10/01/2023    BUN 52 (H) 10/01/2023    CREATININE 2.33 (H) 10/01/2023    CALCIUM 9.2 10/01/2023    AST 21 2023    ALT 10 2023    ALKPHOS 46 2023    EGFR 24 10/01/2023     Urinalysis:   Lab Results   Component Value Date    COLORU Red 2023    CLARITYU Cloudy 2023    SPECGRAV 1.010 2023    PHUR 6.5 2023    LEUKOCYTESUR Interference- unable to analyze (A) 2023    NITRITE Interference- unable to analyze 2023    GLUCOSEU Negative 2023    KETONESU Interference- unable to analyze (A) 2023    BILIRUBINUR Interference- unable to analyze (A) 2023    BLOODU Interference- unable to analyze (A) 2023     Urine Culture:   Lab Results   Component Value Date    URINECX 10,000-19,000 cfu/ml Pseudomonas aeruginosa (A) 2023     PSA: No results found for: "PSA"      Assessment/ Plan:  Gross hematuria  /complicated uti/  chronic uti  S/p radical prostectomy x 20 y ago  difficult cath  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

## 2023-10-01 NOTE — PLAN OF CARE
Problem: PHYSICAL THERAPY ADULT  Goal: Performs mobility at highest level of function for planned discharge setting. See evaluation for individualized goals. Description: Treatment/Interventions: ADL retraining, Functional transfer training, LE strengthening/ROM, Therapeutic exercise, Endurance training, Patient/family training, Bed mobility, Gait training, Spoke to nursing, OT          See flowsheet documentation for full assessment, interventions and recommendations. Note:    Problem List: Decreased strength, Decreased endurance, Impaired balance, Decreased mobility, Decreased coordination, Decreased skin integrity, Pain  Assessment: Patient seen for Physical Therapy evaluation. Patient admitted with Gross hematuria. Comorbidities affecting patient's physical performance include: CKD, COPD, dementia, depression, dysphagia, GERD, hypertension, Parkinsons and UTI. Personal factors affecting patient at time of initial evaluation include: communication issues, inability to ambulate household distances, inability to navigate community distances, decreased cognition, limited home support, decreased initiation and engagement, depression, inability to perform ADLS and inability to perform IADLS . Prior to admission, patient was dependent for mobility, requiring assist for ADLS and requiring assist for IADLS. Please find objective findings from Physical Therapy assessment regarding body systems outlined above with impairments and limitations including weakness, impaired balance, decreased endurance, impaired coordination, gait deviations, pain, decreased activity tolerance, decreased functional mobility tolerance, decreased safety awareness, impaired judgement, fall risk, decreased skin integrity and decreased cognition. The Barthel Index was used as a functional outcome tool presenting with a score of Barthel Index Score: 0 today indicating marked limitations of functional mobility and ADLS.   Patient's clinical presentation is currently unstable/unpredictable as seen in patient's presentation of changing level of pain, varying levels of cognitive performance, increased fall risk, new onset of impairment of functional mobility, decreased endurance and new onset of weakness. Pt would benefit from continued Physical Therapy treatment to address deficits as defined above and maximize level of functional mobility. As demonstrated by objective findings, the assigned level of complexity for this evaluation is high. The patient's [facility] Basic Mobility Inpatient Short Form Raw Score is 6. A Raw score of less than or equal to 16 suggests the patient may benefit from discharge to post-acute rehabilitation services. Please also refer to the recommendation of the Physical Therapist for safe discharge planning. PT Discharge Recommendation: Post acute rehabilitation services    See flowsheet documentation for full assessment.

## 2023-10-01 NOTE — PROGRESS NOTES
Daily Progress Note - [facility]  Christiano Carrasco 80 y.o. male MRN: [id number]  Unit/Bed#: [address] Encounter: [id number]  Admitting Physician: Carolina Moffett MD   PCP: ROSITA Monae  Date of Admission:  9/29/2023  3:48 PM    Assessment and Plan    * Gross hematuria  Assessment & Plan    Acute on chronic. Being followed per urology. Noted to have Pseudomonas in his urine cultures. Plan:    · Discontinue ceftriaxone 1 g, IV Q24H for covering gram neg organism due to prior history of chronic UTIs  · Started Cefepime 1 g, IV, Q12H   · Being followed per urology; Recommendations below  · Change Vazquez's catheter from 18 to 22  · Continuous bladder irrigation  · He will consider doing cystoscopy and if found having bladder neck contracture, would need to have the contracture opened to make catheter insertion easier in the future   · Consider d'cing Vazquez's with antibiotics prophylaxis  · Soft restraints, since patient keeps pulling out the catheter    Catheter-associated urinary tract infection   Assessment & Plan    Patient has a history of developing chronic UTIs.   UA showed red,cloudy, turbid urine, nitrite negative, but the results showed interference - unable to analyze    Plan    · Vazquez's catheter changed from 18 to 22  · Patient pulling out vazquez's catheter, put soft restraints  · Follow urine culture  · 1st: > 100,000 cfu Pseudomonas  · 2nd: 10,000 - 19,000 cfu Pseudomonas   · Urology following, appreciate recommendations  · See plan under gross hematuria    Depression  Assessment & Plan    Chronic, stable    · Continue home dose of Zoloft 100 mg daily    Dry eyes  Assessment & Plan    Patient has a chronic problem of having dry eyes    · Continue putting artificial tears, 1 drop in both eyes as needed    Gastroesophageal reflux disease  Assessment & Plan    Chronic, stable    · Continue home dose of famotidine 20 mg every other day    Chronic obstructive pulmonary disease (HCC)  Assessment & Plan    Chronic, stable. 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 Dysphagia  Assessment & Plan    Patient is unable to swallow his medication, failed speech evaluation at his prior hospital admission. G-tube was placed. · Consult for speech evaluation ordered    Severe dementia [facility])  Assessment & Plan    Chronic, upon chart review, no documentation was found for absence seizures, EEG or neurology note. Patient sees a neurologist for his Parkinson's. · Continue Zoloft 100 mg daily    Chronic kidney disease  Assessment & Plan    Lab Results   Component Value Date    EGFR 24 10/01/2023    EGFR 24 09/30/2023    EGFR 24 09/29/2023    CREATININE 2.33 (H) 10/01/2023    CREATININE 2.36 (H) 09/30/2023    CREATININE 2.35 (H) 09/29/2023     Chronic, stable    · Unclear baseline since no prior records exist  · Avoid nephrotoxic drugs  · Avoid dehydration  · Avoid hypotension   · Trend creatinine levels  · Dose adjustment for medications as appropriate    Hypertension  Assessment & Plan    Chronic, stable    /73 mmHg. · Patient was recently started on labetalol 200 mg every 8 hourly  · Continue his home medications  · Monitor blood pressure    PD (Parkinson's disease)  Assessment & Plan    Chronic, stable -resting tremors present in all 4 limbs, more pronounced on his left leg.     · Patient has been on carbidopa levodopa for a long time,  · Continue home medications    VTE Pharmacologic Prophylaxis: VTE Score: 11 High Risk (Score >/= 5) - Pharmacological DVT Prophylaxis Contraindicated. Sequential Compression Devices Ordered. True Kraus hematuria    Patient Centered Rounds: I have performed bedside rounds with nursing staff today. Discussions with Specialists or Other Care Team Provider: Urology    Time Spent for Care: 35 minutes. More than 50% of total time spent on counseling and coordination of care as described above. Current Length of Stay: 2 day(s)    Current Patient Status: Inpatient   Certification Statement: The patient will continue to require additional inpatient hospital stay due to management of gross hematuria. Discharge Plan: Pending    Code Status: Level 3 - DNAR and DNI    Subjective:     Patient was evaluated at bedside. At first he was asleep, mumbling. When awakened, he did not speak much. Nodded yes when asked if he was in pain but unable to answer where his pain was. Soft restraints in place. No acute distress. Objective     Vitals:   Temp (24hrs), Av.7 °F (37.1 °C), Min:98.4 °F (36.9 °C), Max:99.4 °F (37.4 °C)    Temp:  [98.4 °F (36.9 °C)-99.4 °F (37.4 °C)] 98.6 °F (37 °C)  HR:  [78-86] 78  Resp:  [16-18] 18  BP: (138-147)/(69-73) 145/73  SpO2:  [96 %-99 %] 99 %  Body mass index is 26.5 kg/m². Input and Output Summary (last 24 hours): Intake/Output Summary (Last 24 hours) at 10/1/2023 0912  Last data filed at 10/1/2023 0601  Gross per 24 hour   Intake 900 ml   Output 1600 ml   Net -700 ml     Physical Exam  Vitals and nursing note reviewed. Constitutional:       General: He is sleeping. He is not in acute distress. Appearance: He is overweight. He is ill-appearing. He is not toxic-appearing. HENT:      Head: Normocephalic and atraumatic. Right Ear: External ear normal.      Left Ear: External ear normal.   Eyes:      General: No scleral icterus. Conjunctiva/sclera: Conjunctivae normal.   Cardiovascular:      Rate and Rhythm: Normal rate and regular rhythm. Pulses: Normal pulses.       Heart sounds: Normal heart sounds. Pulmonary:      Effort: Pulmonary effort is normal.      Breath sounds: Normal breath sounds. Abdominal:      General: Abdomen is flat. There is no distension. Palpations: Abdomen is soft. Tenderness: There is no abdominal tenderness. Genitourinary:     Penis: Normal.       Testes: Normal.      Comments: Lieberman catheter in place  Musculoskeletal:         General: Normal range of motion. Cervical back: Normal range of motion. Right lower leg: No edema. Left lower leg: No edema. Comments: SCD and pressure relieving boots in place x2   Skin:     General: Skin is warm and dry. Capillary Refill: Capillary refill takes less than 2 seconds. Neurological:      Mental Status: He is oriented to person, place, and time. Psychiatric:         Mood and Affect: Mood normal.         Behavior: Behavior normal.         Thought Content: Thought content normal.      Comments: Talking in his sleep     Additional Data:     Labs:    Results from last 7 days   Lab Units 10/01/23  0526   WBC Thousand/uL 14.69*   HEMOGLOBIN g/dL 9.6*   HEMATOCRIT % 30.6*   PLATELETS Thousands/uL 245   NEUTROS PCT % 79*   LYMPHS PCT % 11*   MONOS PCT % 8   EOS PCT % 1     Results from last 7 days   Lab Units 10/01/23  0526 09/30/23  0521 09/29/23  1629   POTASSIUM mmol/L 3.9   < > 4.6   CHLORIDE mmol/L 106   < > 102   CO2 mmol/L 26   < > 26   BUN mg/dL 52*   < > 58*   CREATININE mg/dL 2.33*   < > 2.35*   CALCIUM mg/dL 9.2   < > 9.2   ALK PHOS U/L  --   --  46   ALT U/L  --   --  10   AST U/L  --   --  21    < > = values in this interval not displayed. Results from last 7 days   Lab Units 09/29/23  1629   INR  1.13     Results from last 7 days   Lab Units 10/01/23  0705 10/01/23  0610 09/30/23  2344 09/30/23  1737 09/30/23  1552   POC GLUCOSE mg/dl 155* 184* 173* 162* 155*     Results from last 7 days   Lab Units 09/29/23  1629   HEMOGLOBIN A1C % 6.0*     * I Have Reviewed All Lab Data Listed Above.   * Additional Pertinent Lab Tests Reviewed: [facility] Admission Reviewed    Results from last 7 days   Lab Units 09/29/23 2023 09/29/23  1630   URINE CULTURE  10,000-19,000 cfu/ml Pseudomonas aeruginosa* >100,000 cfu/ml Pseudomonas aeruginosa*     Last 24 Hours Medication List:     Current Facility-Administered Medications   Medication Dose Route Frequency Provider Last Rate   • acetaminophen  650 mg Per G Tube Q6H PRN Roberto Perkins MD     • amLODIPine  10 mg Per G Tube Daily Andrey Modi MD     • artificial tear   Both Eyes Q4H PRN Andrey Modi MD     • ascorbic acid  250 mg Per G Tube Daily Andrey Modi MD     • budesonide  0.25 mg Nebulization BID Andrey Modi MD     • carbidopa-levodopa  1 tablet Per G Tube TID Andrey Modi MD     • cefTRIAXone  1,000 mg Intravenous Q24H Andrey Modi MD 1,000 mg (09/30/23 2152)   • famotidine  20 mg Per G Tube Every Other Day Andrey Modi MD     • folic acid  1 mg Per G Tube Daily Andrey Modi MD     • ipratropium-albuterol  3 mL Nebulization TID Roberto Perkins MD     • labetalol  200 mg Per G Tube Q8H [facility] Andrey Modi MD     • lamoTRIgine  25 mg Per G Tube BID Andrey Modi MD     • latanoprost  1 drop Both Eyes HS Andrey Modi MD     • magnesium hydroxide  30 mL Per G Tube Daily PRN Andrey Modi MD     • melatonin  6 mg Per G Tube HS Andrey Modi MD     • nystatin   Topical TID Andrey Modi MD     • oxybutynin  2.5 mg Per G Tube BID Roberto Perkins MD     • senna  8.8 mg Oral HS Melody Tloentino MD     • sertraline  100 mg Per G Tube Daily Andrey Modi MD     • sodium chloride  75 mL/hr Intravenous Continuous Melody Tolentino MD 75 mL/hr (09/30/23 2152)     ** Please Note: Dictation voice to text software may have been used in the creation of this document.  **    Anna Queen MD  10/01/23  9:12 AM

## 2023-10-02 ENCOUNTER — APPOINTMENT (INPATIENT)
Dept: RADIOLOGY | Facility: HOSPITAL | Age: 83
DRG: 668 | End: 2023-10-02
Payer: MEDICARE

## 2023-10-02 PROBLEM — R53.2 FUNCTIONAL QUADRIPLEGIA (HCC): Status: ACTIVE | Noted: 2023-10-02

## 2023-10-02 LAB
ANION GAP SERPL CALCULATED.3IONS-SCNC: 6 MMOL/L
BASOPHILS # BLD AUTO: 0.09 THOUSANDS/ÂΜL (ref 0–0.1)
BASOPHILS NFR BLD AUTO: 1 % (ref 0–1)
BUN SERPL-MCNC: 44 MG/DL (ref 5–25)
CALCIUM SERPL-MCNC: 9.2 MG/DL (ref 8.4–10.2)
CHLORIDE SERPL-SCNC: 112 MMOL/L (ref 96–108)
CO2 SERPL-SCNC: 25 MMOL/L (ref 21–32)
CREAT SERPL-MCNC: 2.11 MG/DL (ref 0.6–1.3)
EOSINOPHIL # BLD AUTO: 0.18 THOUSAND/ÂΜL (ref 0–0.61)
EOSINOPHIL NFR BLD AUTO: 1 % (ref 0–6)
ERYTHROCYTE [DISTWIDTH] IN BLOOD BY AUTOMATED COUNT: 16.6 % (ref 11.6–15.1)
GFR SERPL CREATININE-BSD FRML MDRD: 28 ML/MIN/1.73SQ M
GLUCOSE SERPL-MCNC: 128 MG/DL (ref 65–140)
GLUCOSE SERPL-MCNC: 154 MG/DL (ref 65–140)
GLUCOSE SERPL-MCNC: 156 MG/DL (ref 65–140)
GLUCOSE SERPL-MCNC: 173 MG/DL (ref 65–140)
GLUCOSE SERPL-MCNC: 177 MG/DL (ref 65–140)
GLUCOSE SERPL-MCNC: 180 MG/DL (ref 65–140)
HCT VFR BLD AUTO: 29.5 % (ref 36.5–49.3)
HGB BLD-MCNC: 9.1 G/DL (ref 12–17)
IMM GRANULOCYTES # BLD AUTO: 0.09 THOUSAND/UL (ref 0–0.2)
IMM GRANULOCYTES NFR BLD AUTO: 1 % (ref 0–2)
LYMPHOCYTES # BLD AUTO: 1.8 THOUSANDS/ÂΜL (ref 0.6–4.47)
LYMPHOCYTES NFR BLD AUTO: 10 % (ref 14–44)
MAGNESIUM SERPL-MCNC: 1.9 MG/DL (ref 1.9–2.7)
MCH RBC QN AUTO: 27.2 PG (ref 26.8–34.3)
MCHC RBC AUTO-ENTMCNC: 30.8 G/DL (ref 31.4–37.4)
MCV RBC AUTO: 88 FL (ref 82–98)
MONOCYTES # BLD AUTO: 1.59 THOUSAND/ÂΜL (ref 0.17–1.22)
MONOCYTES NFR BLD AUTO: 9 % (ref 4–12)
NEUTROPHILS # BLD AUTO: 14.75 THOUSANDS/ÂΜL (ref 1.85–7.62)
NEUTS SEG NFR BLD AUTO: 78 % (ref 43–75)
NRBC BLD AUTO-RTO: 0 /100 WBCS
PLATELET # BLD AUTO: 234 THOUSANDS/UL (ref 149–390)
PMV BLD AUTO: 9.7 FL (ref 8.9–12.7)
POTASSIUM SERPL-SCNC: 3.9 MMOL/L (ref 3.5–5.3)
RBC # BLD AUTO: 3.34 MILLION/UL (ref 3.88–5.62)
SODIUM SERPL-SCNC: 143 MMOL/L (ref 135–147)
WBC # BLD AUTO: 18.5 THOUSAND/UL (ref 4.31–10.16)

## 2023-10-02 PROCEDURE — 80048 BASIC METABOLIC PNL TOTAL CA: CPT

## 2023-10-02 PROCEDURE — G1004 CDSM NDSC: HCPCS

## 2023-10-02 PROCEDURE — 85025 COMPLETE CBC W/AUTO DIFF WBC: CPT

## 2023-10-02 PROCEDURE — 99233 SBSQ HOSP IP/OBS HIGH 50: CPT | Performed by: INTERNAL MEDICINE

## 2023-10-02 PROCEDURE — 82948 REAGENT STRIP/BLOOD GLUCOSE: CPT

## 2023-10-02 PROCEDURE — 94760 N-INVAS EAR/PLS OXIMETRY 1: CPT

## 2023-10-02 PROCEDURE — 74176 CT ABD & PELVIS W/O CONTRAST: CPT

## 2023-10-02 PROCEDURE — 94640 AIRWAY INHALATION TREATMENT: CPT

## 2023-10-02 PROCEDURE — 83735 ASSAY OF MAGNESIUM: CPT

## 2023-10-02 RX ORDER — MAGNESIUM SULFATE 1 G/100ML
1 INJECTION INTRAVENOUS ONCE
Status: COMPLETED | OUTPATIENT
Start: 2023-10-02 | End: 2023-10-02

## 2023-10-02 RX ORDER — POTASSIUM CHLORIDE 14.9 MG/ML
20 INJECTION INTRAVENOUS ONCE
Status: COMPLETED | OUTPATIENT
Start: 2023-10-02 | End: 2023-10-02

## 2023-10-02 RX ADMIN — NYSTATIN: 100000 POWDER TOPICAL at 21:23

## 2023-10-02 RX ADMIN — Medication 6 MG: at 21:22

## 2023-10-02 RX ADMIN — CARBIDOPA AND LEVODOPA 1 TABLET: 25; 100 TABLET ORAL at 17:01

## 2023-10-02 RX ADMIN — FAMOTIDINE 20 MG: 20 TABLET, FILM COATED ORAL at 09:28

## 2023-10-02 RX ADMIN — OXYBUTYNIN CHLORIDE 2.5 MG: 5 TABLET ORAL at 19:01

## 2023-10-02 RX ADMIN — CARBIDOPA AND LEVODOPA 1 TABLET: 25; 100 TABLET ORAL at 21:22

## 2023-10-02 RX ADMIN — CEFEPIME HYDROCHLORIDE 1000 MG: 1 INJECTION, SOLUTION INTRAVENOUS at 11:22

## 2023-10-02 RX ADMIN — LAMOTRIGINE 25 MG: 25 TABLET ORAL at 09:28

## 2023-10-02 RX ADMIN — LABETALOL HYDROCHLORIDE 200 MG: 100 TABLET, FILM COATED ORAL at 13:48

## 2023-10-02 RX ADMIN — AMLODIPINE BESYLATE 10 MG: 10 TABLET ORAL at 09:28

## 2023-10-02 RX ADMIN — CEFEPIME HYDROCHLORIDE 1000 MG: 1 INJECTION, SOLUTION INTRAVENOUS at 22:22

## 2023-10-02 RX ADMIN — IPRATROPIUM BROMIDE AND ALBUTEROL SULFATE 3 ML: 2.5; .5 SOLUTION RESPIRATORY (INHALATION) at 19:51

## 2023-10-02 RX ADMIN — Medication 250 MG: at 09:28

## 2023-10-02 RX ADMIN — FOLIC ACID 1 MG: 1 TABLET ORAL at 09:28

## 2023-10-02 RX ADMIN — SENNOSIDES 8.8 MG: 8.8 SYRUP ORAL at 21:23

## 2023-10-02 RX ADMIN — MAGNESIUM SULFATE HEPTAHYDRATE 1 G: 1 INJECTION, SOLUTION INTRAVENOUS at 09:01

## 2023-10-02 RX ADMIN — IPRATROPIUM BROMIDE AND ALBUTEROL SULFATE 3 ML: 2.5; .5 SOLUTION RESPIRATORY (INHALATION) at 13:10

## 2023-10-02 RX ADMIN — LABETALOL HYDROCHLORIDE 200 MG: 100 TABLET, FILM COATED ORAL at 21:22

## 2023-10-02 RX ADMIN — BUDESONIDE 0.25 MG: 0.25 INHALANT ORAL at 07:07

## 2023-10-02 RX ADMIN — IPRATROPIUM BROMIDE AND ALBUTEROL SULFATE 3 ML: 2.5; .5 SOLUTION RESPIRATORY (INHALATION) at 07:07

## 2023-10-02 RX ADMIN — BUDESONIDE 0.25 MG: 0.25 INHALANT ORAL at 19:51

## 2023-10-02 RX ADMIN — LABETALOL HYDROCHLORIDE 200 MG: 100 TABLET, FILM COATED ORAL at 06:00

## 2023-10-02 RX ADMIN — LAMOTRIGINE 25 MG: 25 TABLET ORAL at 19:02

## 2023-10-02 RX ADMIN — LATANOPROST 1 DROP: 50 SOLUTION OPHTHALMIC at 21:24

## 2023-10-02 RX ADMIN — POTASSIUM CHLORIDE 20 MEQ: 14.9 INJECTION, SOLUTION INTRAVENOUS at 09:02

## 2023-10-02 RX ADMIN — CARBIDOPA AND LEVODOPA 1 TABLET: 25; 100 TABLET ORAL at 09:28

## 2023-10-02 RX ADMIN — OXYBUTYNIN CHLORIDE 2.5 MG: 5 TABLET ORAL at 09:28

## 2023-10-02 RX ADMIN — NYSTATIN: 100000 POWDER TOPICAL at 09:39

## 2023-10-02 RX ADMIN — NYSTATIN: 100000 POWDER TOPICAL at 17:01

## 2023-10-02 RX ADMIN — SERTRALINE HYDROCHLORIDE 100 MG: 100 TABLET ORAL at 09:28

## 2023-10-02 NOTE — CASE MANAGEMENT
Case Management Discharge Planning Note    Patient name Alex Chan  Location [facility] [address]  Dimas Vega MRN [id number]  : 1940 Date 10/2/2023       Current Admission Date: 2023  Current Admission Diagnosis:Gross hematuria   Patient Active Problem List    Diagnosis Date Noted   • Functional quadriplegia ([address]) 10/02/2023   • Gross hematuria 2023   • Type 2 diabetes mellitus without complication, without long-term current use of insulin ([address]) 2023   • Encounter for general adult medical examination with abnormal findings 2023   • Catheter-associated urinary tract infection  2023   • PD (Parkinson's disease) 2023   • Hypertension 2023   • Chronic kidney disease 2023   • Severe dementia ([address]) 2023   • Dysphagia 2023   • 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[address]) 2023   • Bedbound 2023   • Pressure injury of skin of right hip 2023   • Skin ulcer of sacrum ([address]) 2023   • H/O left mastectomy 2023   • History of prostate cancer 2023   • Arm DVT (deep venous thromboembolism), acute, left ([address]) 2023   • Indwelling Lieberman catheter present 2023   • Chronic obstructive pulmonary disease ([address]) 2023   • Constipation 2023   • Gastroesophageal reflux disease 2023   • Glaucoma of both eyes 2023   • Insomnia 2023   • Fungal rash of torso 2023   • Dry eyes 2023   • Depression 2023   • Benign prostatic hyperplasia 2023      LOS (days): 3  Geometric Mean LOS (GMLOS) (days): 4.90  Days to GMLOS:2     OBJECTIVE:  Risk of Unplanned Readmission Score: 14.7         Current admission status: Inpatient   [facility]erred Pharmacy:   [facility] [address] [place] [address]  Phone: [phone] Fax: [phone]    Primary Care Provider: ROSITA Monae    Primary Insurance: MEDICARE  Secondary Insurance: Nakia santamaria CROSS    DISCHARGE DETAILS:    Other Referral/Resources/Interventions Provided:  Interventions: C  Referral Comments: [facility] [address] accepted case for resumption of care as requested by daughter. Agency will be reserved in [address]

## 2023-10-02 NOTE — CASE MANAGEMENT
Case Management Assessment & Discharge Planning Note    Patient name Jj Roth  Location [address]  [address] [address] MRN [id number]  : 1940 Date 10/2/2023       Current Admission Date: 2023  Current Admission Diagnosis:Gross hematuria   Patient Active Problem List    Diagnosis Date Noted   • Functional quadriplegia ([address]) 10/02/2023   • Gross hematuria 2023   • Type 2 diabetes mellitus without complication, without long-term current use of insulin ([address]) 2023   • Encounter for general adult medical examination with abnormal findings 2023   • Catheter-associated urinary tract infection  2023   • PD (Parkinson's disease) 2023   • Hypertension 2023   • Chronic kidney disease 2023   • Severe dementia ([address]) 2023   • Dysphagia 2023   • 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[address]) 2023   • Bedbound 2023   • Pressure injury of skin of right hip 2023   • Skin ulcer of sacrum ([address]) 2023   • H/O left mastectomy 2023   • History of prostate cancer 2023   • Arm DVT (deep venous thromboembolism), acute, left ([address]) 2023   • Indwelling Lieberman catheter present 2023   • Chronic obstructive pulmonary disease ([address]) 2023   • Constipation 2023   • Gastroesophageal reflux disease 2023   • Glaucoma of both eyes 2023   • Insomnia 2023   • Fungal rash of torso 2023   • Dry eyes 2023   • Depression 2023   • Benign prostatic hyperplasia 2023      LOS (days): 3  Geometric Mean LOS (GMLOS) (days): 4.90  Days to GMLOS:2     OBJECTIVE:    Risk of Unplanned Readmission Score: 14.7     Current admission status: Inpatient  Referral Reason: Other (Post acute planning)    [facility]acy[facility] [address] [place] [address]  Phone: [phone] Fax: [phone]    Primary Care Provider: ROSITA Monae Insurance: MEDICARE  Secondary Insurance: BLUE CROSS    ASSESSMENT:  [id number] Jace Garcia - Daughter   Primary Phone: [phone] (Mobile)               Advance Directives  Does patient have a [address]?: Yes  Does patient have Advance Directives?: Yes  Advance Directives: Power of  for health care (requested copy)  Primary Contact: Renetta Phelps    Readmission Root Cause  30 Day Readmission: No    Patient Information  Admitted from[de-identified] Home  Mental Status: Confused  During Assessment patient was accompanied by: Not accompanied during assessment  Assessment information provided by[de-identified] Daughter  Primary Caregiver: Family  Caregiver's Name[de-identified] Makenzie Marsh Relationship to Patient[de-identified] Family Member (daughter)  Caregiver's Telephone Number[de-identified] [phone]  Support Systems: Daughter, Family members  [place] of Residence: [address] [place] [address] do you live in?: [place]  Type of Current Residence: [address] [place] [address] home  Upon entering residence, is there a bedroom on the main floor (no further steps)?: Yes  Upon entering residence, is there a bathroom on the main floor (no further steps)?: Yes  In the last 12 months, was there a time when you were not able to pay the mortgage or rent on time?: No  In the last 12 months, how many places have you lived?: 2 (was living in nursing home in [place] before daughter moved pt in with her last week)  In the last 12 months, was there a time when you did not have a steady place to sleep or slept in a shelter (including now)?: No  Homeless/housing insecurity resource given?: N/A  Living Arrangements: Lives w/ Daughter    Activities of Daily Living Prior to Admission  Functional Status: Total dependent  Completes ADLs independently?: No  Level of ADL dependence: Total Dependent  Ambulates independently?: No  Level of ambulatory dependence:  Total Dependent  Does patient use assisted devices?: Yes  Assisted Devices (DME) used: Hospital Bed, Wheelchair, Other (Comment) (reclining wheelchair, tube feed)  Does patient have a history of HHC?: Yes  Does patient currently have [address]?: Yes    Current Home Health Care  Type of Current Home Care Services: Nurse visit  [address][de-identified] [facility] Provider[de-identified] PCP    Patient Information Continued  Income Source: Pension/alf  Does patient have prescription coverage?: Yes  Does patient receive dialysis treatments?: No    Means of Transportation  Means of Transport to Appts[de-identified] Other (Comment) (medical transport)    DISCHARGE DETAILS:    Discharge planning discussed with[de-identified] Daughter, Almas Lorenzana of Choice: Yes  Comments - Freedom of Choice: JENSEN spoke with pt's daughter over phone to assess needs and discuss plans. Daughter's plan is for pt to return home with her and home care services as before. Daughter explained that she recently moved pt in with her from his long term nursing home in [place]. Per daughter pt moved to her home last Wednesday when he was discharged from a [place] hospital.  Daughter requested referral back to A of [address] [place] [address] for resumption of care. She also shared that the VNA had recommended some changes for pt's tube feed. Daughter said pt's tube feed is supplied by [facility]. SW offered ongoing support and assistance with planning. SW will follow up with home care and DME services as requested. Will follow.   CM contacted family/caregiver?: Yes    Contacts  Patient Contacts: Uday Prasad  Relationship to Patient[de-identified] Family (daughter)  Contact Method: Phone  Phone Number: [phone]  Reason/Outcome: Discharge [facility]         Is the patient interested in [address] at discharge?: Yes  [facility] requested[de-identified] [facility] Name[de-identified] [facility] of [address] [place] [address] External Referral Reason (only applicable if external HHA name selected): Patient has established relationship with provider  [facility] Provider[de-identified] PCP  Home Health Services Needed[de-identified] Evaluate Functional Status and Safety, Urinary Incontinence Catheter Management, Wound/Ostomy Care  Homebound Criteria Met[de-identified] Requires Medical Transportation  Supporting Clincal Findings[de-identified] Bed Bound or Wheelchair Bound    DME Referral Provided  Referral made for DME?: No    Other Referral/Resources/Interventions Provided:  Interventions: [facility]  Referral Comments: Referral made to [facility] of [facility] for resumption of care. Will follow to monitor tube feed recommendations and assist with planning if changes recommended.     Treatment Team Recommendation: Short Term Rehab  Discharge Destination Plan[de-identified] Home with [address] at Discharge : [place]

## 2023-10-02 NOTE — PLAN OF CARE
Problem: Potential for Falls  Goal: Patient will remain free of falls  Description: INTERVENTIONS:  - Educate patient/family on patient safety including physical limitations  - Instruct patient to call for assistance with activity   - Consult OT/PT to assist with strengthening/mobility   - Keep Call bell within reach  - Keep bed low and locked with side rails adjusted as appropriate  - Keep care items and personal belongings within reach  - Initiate and maintain comfort rounds  - Make Fall Risk Sign visible to staff  - Initiate/Maintain bed alarm  Problem: GENITOURINARY - ADULT  Goal: Maintains or returns to baseline urinary function  Description: INTERVENTIONS:  - Assess urinary function  - Encourage oral fluids to ensure adequate hydration if ordered  - Administer IV fluids as ordered to ensure adequate hydration  - Administer ordered medications as needed  - Offer frequent toileting  - Follow urinary retention protocol if ordered  Outcome: Progressing  Goal: Absence of urinary retention  Description: INTERVENTIONS:  - Assess patient’s ability to void and empty bladder  - Monitor I/O  - Bladder scan as needed  - Discuss with physician/AP medications to alleviate retention as needed  - Discuss catheterization for long term situations as appropriate  Outcome: Progressing  Goal: Urinary catheter remains patent  Description: INTERVENTIONS:  - Assess patency of urinary catheter  - If patient has a chronic vazquez, consider changing catheter if non-functioning  - Follow guidelines for intermittent irrigation of non-functioning urinary catheter  Outcome: Progressing     Problem: SKIN/TISSUE INTEGRITY - ADULT  Goal: Skin Integrity remains intact(Skin Breakdown Prevention)  Description: Assess:  -Perform Henry assessment every shift  -Clean and moisturize skin every shift  -Inspect skin when repositioning, toileting, and assisting with ADLS  -Assess under medical devices such as vazquez every shift  -Assess extremities for adequate circulation and sensation     Bed Management:  -Have minimal linens on bed & keep smooth, unwrinkled  -Change linens as needed when moist or perspiring  -Avoid sitting or lying in one position for more than 2 hours while in bed  -Keep HOB at 45 degrees     Toileting:  -Offer bedside commode  -Assess for incontinence every shift    Activity:  -Encourage activity and walks on unit  -Encourage or provide ROM exercises   -Turn and reposition patient every 2 Hours  -Use appropriate equipment to lift or move patient in bed  -Instruct/ Assist with weight shifting every 2 when out of bed in chair  -Consider limitation of chair time 1 hour intervals    Skin Care:  -Avoid use of baby powder, tape, friction and shearing, hot water or constrictive clothing  -Relieve pressure over bony prominences using foam wedges  -Do not massage red bony areas    Next Steps:  -Teach patient strategies to minimize risks such as falls   -Outcome: Progressing  Goal: Incision(s), wounds(s) or drain site(s) healing without S/S of infection  Description: INTERVENTIONS  - Assess and document dressing, incision, wound bed, drain sites and surrounding tissue  - Provide patient and family education  - Perform skin care/dressing changes every shift  Problem: SAFETY ADULT  Goal: Patient will remain free of falls  Description: INTERVENTIONS:  - Educate patient/family on patient safety including physical limitations  - Instruct patient to call for assistance with activity   - Consult OT/PT to assist with strengthening/mobility   - Keep Call bell within reach  - Keep bed low and locked with side rails adjusted as appropriate  - Keep care items and personal belongings within reach  - Initiate and maintain comfort rounds  - Make Fall Risk Sign visible to staff  - Offer Toileting every 2 Hours, in advance of need  - Initiate/Maintain bed alarm  - Apply yellow socks and bracelet for high fall risk patients  - Consider moving patient to room near nurses station  Outcome: Progressing  Goal: Maintain or return to baseline ADL function  Description: INTERVENTIONS:  -  Assess patient's ability to carry out ADLs; assess patient's baseline for ADL function and identify physical deficits which impact ability to perform ADLs (bathing, care of mouth/teeth, toileting, grooming, dressing, etc.)  - Assess/evaluate cause of self-care deficits   - Assess range of motion  - Assess patient's mobility; develop plan if impaired  - Assess patient's need for assistive devices and provide as appropriate  - Encourage maximum independence but intervene and supervise when necessary  - Involve family in performance of ADLs  - Assess for home care needs following discharge   - Consider OT consult to assist with ADL evaluation and planning for discharge  - Provide patient education as appropriate  Outcome: Progressing  Goal: Maintains/Returns to pre admission functional level  Description: INTERVENTIONS:  - Perform BMAT or MOVE assessment daily.   - Set and communicate daily mobility goal to care team and patient/family/caregiver. - Collaborate with rehabilitation services on mobility goals if consulted  - Perform Range of Motion 3 times a day. - Reposition patient every 2 hours.   - Out of bed for toileting  - Record patient progress and toleration of activity level   Outcome: Progressing     Outcome: Progressing  Goal: Pressure injury heals and does not worsen  Description: Interventions:  - Implement low air loss mattress or specialty surface (Criteria met)  - Apply silicone foam dressing  - Instruct/assist with weight shifting every 60 minutes when in chair   - Limit chair time to 1 hour intervals  - Use special pressure reducing interventions such as wedges when in chair   - Apply fecal or urinary incontinence containment device   - Consider nutrition services referral as needed  Outcome: Progressing     - Apply yellow socks and bracelet for high fall risk patients  - Consider moving patient to room near nurses station  Outcome: Progressing     Problem: RESPIRATORY - ADULT  Goal: Achieves optimal ventilation and oxygenation  Description: INTERVENTIONS:  - Assess for changes in respiratory status  - Assess for changes in mentation and behavior  - Position to facilitate oxygenation and minimize respiratory effort  - Oxygen administered by appropriate delivery if ordered  - Initiate smoking cessation education as indicated  - Encourage broncho-pulmonary hygiene including cough, deep breathe, Incentive Spirometry  - Assess the need for suctioning and aspirate as needed  - Assess and instruct to report SOB or any respiratory difficulty  - Respiratory Therapy support as indicated  Outcome: Progressing

## 2023-10-02 NOTE — QUICK NOTE
Talked to daughter with Dr. Alex Merino regarding current management of gross hematuria. Daughter understood and mentioned she would bring the pt's medical records for further information. It was a pleasure to be of service to Nay Certain. Blanca Rodrigez MD., MSMS.    [facility] PGY 3 - [facility]

## 2023-10-02 NOTE — PROGRESS NOTES
Daily Progress Note - [facility] Res[facility]  Tiki Guardado 80 y.o. male MRN: [id number]  Unit/Bed#: [address] Encounter: [id number]  Admitting Physician: Mariana Meyer MD   PCP: ROSITA Monae  Date of Admission:  9/29/2023  3:48 PM     Assessment and Plan     * Gross hematuria  Assessment & Plan     Acute on chronic.      Being followed per urology. Noted to have Pseudomonas in his urine cultures. Pt gross hematuria worsened today. Noted to have blood clots and sediments in catheter.      • Continue Cefepime 1 g, IV, Q12H   • Being followed per urology; Recommendations appreciated\  • Ordered new CT abd pel   • Consider restarting CBI  • Maintain site clean   • He will consider doing cystoscopy and if found having bladder neck contracture, would need to have the contracture opened to make catheter insertion easier in the future   • Consider d'cing Vazquez's with antibiotics prophylaxis  • Soft restraints, since patient keeps pulling out the catheter     Catheter-associated urinary tract infection   Assessment & Plan     Patient has a history of developing chronic UTIs. UA showed red,cloudy, turbid urine, nitrite negative, but the results showed interference - unable to analyze. Pt had low grade fever overnight and worsening WBC despite broad abx.      • Vazquez's catheter changed from 18 to 22  • Continue Cefepime 1 g, IV, Q12H  • Patient pulling out vazquez's catheter, put soft restraints  • Follow urine culture  ? 1st: > 100,000 cfu Pseudomonas  ? 2nd: 10,000 - 19,000 cfu Pseudomonas   • Urology following, appreciate recommendations  • See plan under gross hematuria     Depression  Assessment & Plan     Chronic, stable     • Continue home dose of Zoloft 100 mg daily    Functional quadriplegia   Assessment & Plan             Chronic.      2ry to Parkinsons with severe dementia as evidenced by requiring 100% total assistance with ADLs and maintaining safety, incontinence, and feeding tube, treated with providing % total care, tube feeding, turn and repositioning, and bilateral wrist restraints. · Continue above management     Dry eyes  Assessment & Plan     Patient has a chronic problem of having dry eyes     • Continue putting artificial tears, 1 drop in both eyes as needed     Gastroesophageal reflux disease  Assessment & Plan     Chronic, stable     • Continue home dose of famotidine 20 mg every other day     Chronic obstructive pulmonary disease (HCC)  Assessment & Plan     Chronic, stable. No wheezing on exam     • Continue home medications     Pressure injury of skin of right hip  Assessment & Plan     Patient has a chronic sacral wound, long with a right hip wound with skin peeling off indicating signs of pressure injury     • Reposition every 2 hours  • Consult for wound care placed  • Monitor closely     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HCC)  Assessment & Plan     Chronic, stable     • Gastrostomy tube in place  • Continue tube feeds per nutrition recommendations      Dysphagia  Assessment & Plan     Patient is unable to swallow his medication, failed speech evaluation at his prior hospital admission. G-tube was placed.       • Consult for speech evaluation ordered     Severe dementia [facility])  Assessment & Plan     Chronic, upon chart review, no documentation was found for absence seizures, EEG or neurology note.  Patient sees a neurologist for his Parkinson's.     • Continue Zoloft 100 mg daily     Chronic kidney disease  Assessment & Plan           Lab Results   Component Value Date     EGFR 24 10/01/2023     EGFR 24 09/30/2023     EGFR 24 09/29/2023     CREATININE 2.33 (H) 10/01/2023     CREATININE 2.36 (H) 09/30/2023     CREATININE 2.35 (H) 09/29/2023      Chronic, stable     • Unclear baseline since no prior records exist  • Avoid nephrotoxic drugs  • Avoid dehydration  • Avoid hypotension   • Trend creatinine levels  • Dose adjustment for medications as appropriate     Hypertension  Assessment & Plan     Chronic, stable     /63 mmHg.      • Patient was recently started on labetalol 200 mg every 8 hourly  • Continue his home medications  • Monitor blood pressure     PD (Parkinson's disease)  Assessment & Plan     Chronic, stable - resting tremors present in all 4 limbs, more pronounced on his left leg.     • Patient has been on carbidopa levodopa for a long time,  • Continue home medications    VTE Pharmacologic Prophylaxis: VTE Score: 11 High Risk (Score >/= 5) - Pharmacological DVT Prophylaxis Contraindicated. Sequential Compression Devices Ordered. Patient Centered Rounds: I have performed bedside rounds with nursing staff today. Discussions with Specialists or Other Care Team Provider: Urology    Education and Discussions with Family / Patient: Daughter   Patient Information Sharing: With the consent of Olivia Live , their loved ones (daughter) were notified today by inpatient team of the patient’s condition and current plan. All questions answered. Time Spent for Care: 35 minutes. More than 50% of total time spent on counseling and coordination of care as described above. Current Length of Stay: 3 day(s)    Current Patient Status: Inpatient   Certification Statement: The patient will continue to require additional inpatient hospital stay due to management of gross hematuria. Discharge Plan: Pending    Code Status: Level 3 - DNAR and DNI    Subjective:     Patient was evaluated at bedside. Appeared to be in discomfort. He was being  Cleaned per the nurses fue to his gorss hematuria. Non talkative, non attentive, and non combative. Objective      Vitals:   Temp (24hrs), Av.2 °F (37.3 °C), Min:98.6 °F (37 °C), Max:100.5 °F (38.1 °C)    Temp:  [98.6 °F (37 °C)-100.5 °F (38.1 °C)] 100.5 °F (38.1 °C)  HR:  [78-94] 83  Resp:  [16-20] 20  BP: (130-159)/() 130/64  SpO2:  [94 %-99 %] 94 %  Body mass index is 26.5 kg/m².      Input and Output Summary (last 24 hours): Intake/Output Summary (Last 24 hours) at 10/2/2023 0723  Last data filed at 10/2/2023 0600  Gross per 24 hour   Intake 1390 ml   Output 50 ml   Net 1340 ml     Physical Exam  Constitutional:       General: He is awake. Appearance: He is ill-appearing. He is not toxic-appearing or diaphoretic. HENT:      Head: Normocephalic and atraumatic. Right Ear: External ear normal.      Left Ear: External ear normal.      Mouth/Throat:      Mouth: Mucous membranes are dry. Eyes:      Conjunctiva/sclera: Conjunctivae normal.   Cardiovascular:      Rate and Rhythm: Normal rate and regular rhythm. Pulses: Normal pulses. Heart sounds: Normal heart sounds. Pulmonary:      Effort: Pulmonary effort is normal.      Breath sounds: Normal breath sounds. Abdominal:      General: Abdomen is flat. There is no distension. Palpations: Abdomen is soft. Genitourinary:     Comments: Clots noted in urinary catheter. Blood at the tip of the penis. Musculoskeletal:      Cervical back: Normal range of motion. Right lower leg: No edema. Left lower leg: No edema. Skin:     General: Skin is warm and dry. Comments: Scar - left breast   Neurological:      Mental Status: He is disoriented. Comments: Non talkative. Appears in pain.       Additional Data:     Labs:    Results from last 7 days   Lab Units 10/02/23  0536   WBC Thousand/uL 18.50*   HEMOGLOBIN g/dL 9.1*   HEMATOCRIT % 29.5*   PLATELETS Thousands/uL 234   NEUTROS PCT % 78*   LYMPHS PCT % 10*   MONOS PCT % 9   EOS PCT % 1     Results from last 7 days   Lab Units 10/02/23  0536 09/30/23  0521 09/29/23  1629   POTASSIUM mmol/L 3.9   < > 4.6   CHLORIDE mmol/L 112*   < > 102   CO2 mmol/L 25   < > 26   BUN mg/dL 44*   < > 58*   CREATININE mg/dL 2.11*   < > 2.35*   CALCIUM mg/dL 9.2   < > 9.2   ALK PHOS U/L  --   --  46   ALT U/L  --   --  10   AST U/L  --   --  21    < > = values in this interval not displayed. Results from last 7 days   Lab Units 09/29/23  1629   INR  1.13     Results from last 7 days   Lab Units 10/02/23  0721 10/02/23  0545 10/01/23  2332 10/01/23  1546 10/01/23  1054   POC GLUCOSE mg/dl 180* 173* 154* 179* 166*     Results from last 7 days   Lab Units 09/29/23  1629   HEMOGLOBIN A1C % 6.0*     * I Have Reviewed All Lab Data Listed Above. * Additional Pertinent Lab Tests Reviewed: All Labs For Current Hospital Admission Reviewed    Imaging:    Imaging Reports Reviewed Today Include: None  Imaging Personally Reviewed by Myself Includes:   All    Recent Cultures (last 7 days):     Results from last 7 days   Lab Units 09/29/23 2023 09/29/23  1630   URINE CULTURE  10,000-19,000 cfu/ml Pseudomonas aeruginosa*  <10,000 cfu/ml Providencia stuartii*  <10,000 cfu/ml Proteus species* >100,000 cfu/ml Pseudomonas aeruginosa*  >100,000 cfu/ml Providencia stuartii*  Proteus species*     Last 24 Hours Medication List:     Current Facility-Administered Medications   Medication Dose Route Frequency Provider Last Rate   • acetaminophen  650 mg Per G Tube Q6H PRN Eusebia Navarro MD     • amLODIPine  10 mg Per G Tube Daily Kimberly Perkins MD     • artificial tear   Both Eyes Q4H PRN Kimberly Perkins MD     • ascorbic acid  250 mg Per G Tube Daily Kimberly Perkins MD     • budesonide  0.25 mg Nebulization BID Kimberly Perkins MD     • carbidopa-levodopa  1 tablet Per G Tube TID Kimberly Perkins MD     • cefepime  1,000 mg Intravenous Q12H Lilliana Barillas MD 1,000 mg (10/01/23 2200)   • famotidine  20 mg Per G Tube Every Other Day Kimberly Perkins MD     • folic acid  1 mg Per G Tube Daily Kimberly Perkins MD     • ipratropium-albuterol  3 mL Nebulization TID Eusebia Navarro MD     • labetalol  200 mg Per G Tube Q8H [address] Kimberly Perkins MD     • lamoTRIgine  25 mg Per G Tube BID Kimberly Perkins MD     • latanoprost  1 drop Both Eyes HS Kimberly Perkins MD     • magnesium hydroxide  30 mL Per G Tube Daily PRN Kimberly Perkins MD     • melatonin  6 mg Per G Tube HS Kimberly Perkins MD     • nystatin   Topical TID Kimberly Perkins MD     • oxybutynin  2.5 mg Per G Tube BID Eusebia Navarro MD     • senna  8.8 mg Oral HS Audrey Rasmussen MD     • sertraline  100 mg Per G Tube Daily Kimberly Perkins MD        ** Please Note: Dictation voice to text software may have been used in the creation of this document.  **    Lilliana Barillas MD  10/02/23  7:23 AM

## 2023-10-02 NOTE — DISCHARGE INSTR - OTHER ORDERS
Plan:  Skin care plans:    1-Cleanse sacrobuttock wounds (clustered as 1 wound) with foaming cleanser & pat dry. Apply thin layer of Triad paste daily and as needed for soilage/dislodgement. Cover with Allevyn bordered foam dressing - change daily & as needed for soilage/dislodgement. 2-Cleanse right hip healed wound with foaming cleanser & pat dry. Apply thin layer of Triad paste 2x/day and as needed for soilage/dislodgement. Do not cover with foam dressing. 3-Cleanse gastrostomy peritubular site with gauze, warm water and pat dry. Apply split gauze over bumper and tape with date. Change daily & prn soilage/dislodgement. 4-Hydraguard barrier cream or equivalent to bilateral heels 2x/day and as needed. 5-Heel lift boots to be worn in bed at all times and after transfer to reclining chair. 6-Use pressure redistribution cushion in chair when out of bed. Limit sitting greater than 1-2 hrs at a time due to stage 3 sacrobuttock pressure injury. 7-Moisturize skin daily with skin nourishing cream.  8-Turn/reposition q2h or when medically stable for pressure re-distribution on skin. 9-Would recommend low air-loss, pressure redistribution mattress. 10-You may follow up at [address] [place] [address] - call Central Scheduling for appointment: [phone].

## 2023-10-02 NOTE — WOUND OSTOMY CARE
Progress Note - Wound   Asael Harris 80 y.o. male MRN: [id number]  Unit/Bed#: [address] Encounter: [id number]      Assessment: This is an 80year old male patient admitted on 9/29/23 with gross hematuria. He has a history of HTN, Parkinson's disease, frequent UTI and gastrostomy tube. He was awake, alert and oriented x 2. He had vazquez to gravity with episodes of fecal incontinence. Assessment Findings:  1-Healed wounds to bilateral buttocks with no open areas - orders in place for skin care and for prevention. Please see below for detailed assessments. 2-Stage 3 pressure injury to right hip - orders in place for wound care and for prevention. 3-Bloody discharge from vazquez catheter noted - primary RN present and aware. Plan:  Skin care plans:  1-Cleanse sacrobuttock healed wounds and right hip wound with foaming cleanser & pat dry. Apply thin layer of Triad paste to both areas 2x/day and prn soilage/dislodgement. Do not cover with foam dressings. 2-Cleanse gastrostomy peritubular site with gauze, warm water and pat dry. Apply split gauze over bumper and tape with date. Change daily & prn soilage/dislodgement. 3-Hydraguard to bilateral heels BID and PRN  4-Float heels on 2 pillows to offload pressure so heels are not in contact with mattress or pillows. 5-Ehob pressure redistribution cushion in chair when out of bed. Limit prolonged sitting. 6-Moisturize skin daily with skin nourishing cream.  7-Turn/reposition q2h or when medically stable for pressure re-distribution on skin. Wound 09/29/23 MASD Buttocks Bilateral;Mid (Active)   Wound Image   10/02/23 1422   Wound Description Epithelialization; Intact 10/02/23 1422   Yvonne-wound Assessment Scar Tissue; Hyperpigmented 10/02/23 1422   Wound Length (cm) 0 cm 10/02/23 1422   Wound Width (cm) 0 cm 10/02/23 1422   Wound Depth (cm) 0 cm 10/02/23 1422   Wound Surface Area (cm^2) 0 cm^2 10/02/23 1422   Wound Volume (cm^3) 0 cm^3 10/02/23 1422   Calculated Wound Volume (cm^3) 0 cm^3 10/02/23 1422   Drainage Amount None 10/02/23 1422   Treatments Cleansed 10/02/23 1422   Dressing Triad paste 10/02/23 1422   Wound packed? No 10/02/23 1422   Dressing Changed New 10/02/23 1422   Dressing Status Intact 10/02/23 1422       Wound 09/29/23 Pressure Injury Hip Right; Outer (Active)   Wound Image   10/02/23 1411   Wound Description Granulation tissue;Pink;Slough; White 10/02/23 1411   Pressure Injury Stage Stage 3 10/02/23 1411   Yvonne-wound Assessment Hyperpigmented;Scar Tissue 10/02/23 1411   Wound Length (cm) 2.5 cm 10/02/23 1411   Wound Width (cm) 2 cm 10/02/23 1411   Wound Depth (cm) 0.1 cm 10/02/23 1411   Wound Surface Area (cm^2) 5 cm^2 10/02/23 1411   Wound Volume (cm^3) 0.5 cm^3 10/02/23 1411   Calculated Wound Volume (cm^3) 0.5 cm^3 10/02/23 1411   Change in Wound Size % 60 10/02/23 1411   Drainage Amount None 10/02/23 1411   Treatments Cleansed 10/02/23 1411   Dressing Triad paste 10/02/23 1411   Dressing Changed New 10/02/23 1411   Dressing Status Clean;Dry; Intact 10/02/23 1411     Discussed assessment findings, and plan of care/recommendations with Ilya Veronica RN and [facility] LPN. Wound care will follow along with patient throughout admission, please call or tiger text with questions and concerns. Recommendations written as orders.   Gabi Mota MSN, RN, [facility]

## 2023-10-02 NOTE — PROGRESS NOTES
-- Patient:  -- MRN: [id number]  -- Aidin Request ID: [id number]  -- Level of care reserved: Sofia Muse  -- Partner Reserved: Visiting Nurse [address], [address] [place] [address] [phone]  -- Clinical needs requested:  -- Geography searched: [id number]  -- Start of Service:  -- Request sent: 4:45pm EDT on 10/2/2023 by Israel Dugan  -- Partner reserved: 5:00pm EDT on 10/2/2023 by Israel Dugan  -- Choice list shared: 4:58pm EDT on 10/2/2023 by Israel Dugan

## 2023-10-02 NOTE — PLAN OF CARE
Problem: Potential for Falls  Goal: Patient will remain free of falls  Description: INTERVENTIONS:  - Educate patient/family on patient safety including physical limitations  - Instruct patient to call for assistance with activity   - Consult OT/PT to assist with strengthening/mobility   - Keep Call bell within reach  - Keep bed low and locked with side rails adjusted as appropriate  - Keep care items and personal belongings within reach  - Initiate and maintain comfort rounds  - Make Fall Risk Sign visible to staff  - Offer Toileting every 2 Hours, in advance of need  - Initiate/Maintain bed alarm  - Obtain necessary fall risk management equipment: yellow socks  - Apply yellow socks and bracelet for high fall risk patients  - Consider moving patient to room near nurses station  Outcome: Progressing     Problem: RESPIRATORY - ADULT  Goal: Achieves optimal ventilation and oxygenation  Description: INTERVENTIONS:  - Assess for changes in respiratory status  - Assess for changes in mentation and behavior  - Position to facilitate oxygenation and minimize respiratory effort  - Oxygen administered by appropriate delivery if ordered  - Initiate smoking cessation education as indicated  - Encourage broncho-pulmonary hygiene including cough, deep breathe, Incentive Spirometry  - Assess the need for suctioning and aspirate as needed  - Assess and instruct to report SOB or any respiratory difficulty  - Respiratory Therapy support as indicated  Outcome: Progressing     Problem: GENITOURINARY - ADULT  Goal: Maintains or returns to baseline urinary function  Description: INTERVENTIONS:  - Assess urinary function  - Encourage oral fluids to ensure adequate hydration if ordered  - Administer IV fluids as ordered to ensure adequate hydration  - Administer ordered medications as needed  - Offer frequent toileting  - Follow urinary retention protocol if ordered  Outcome: Progressing  Goal: Absence of urinary retention  Description: INTERVENTIONS:  - Assess patient’s ability to void and empty bladder  - Monitor I/O  - Bladder scan as needed  - Discuss with physician/AP medications to alleviate retention as needed  - Discuss catheterization for long term situations as appropriate  Outcome: Progressing  Goal: Urinary catheter remains patent  Description: INTERVENTIONS:  - Assess patency of urinary catheter  - If patient has a chronic vazquez, consider changing catheter if non-functioning  - Follow guidelines for intermittent irrigation of non-functioning urinary catheter  Outcome: Progressing     Problem: SKIN/TISSUE INTEGRITY - ADULT  Goal: Skin Integrity remains intact(Skin Breakdown Prevention)  Description: Assess:  -Perform Henry assessment every shift  -Clean and moisturize skin every shift  -Inspect skin when repositioning, toileting, and assisting with ADLS  -Assess under medical devices such as IV sites every shift  -Assess extremities for adequate circulation and sensation     Bed Management:  -Have minimal linens on bed & keep smooth, unwrinkled  -Change linens as needed when moist or perspiring  -Avoid sitting or lying in one position for more than 2 hours while in bed  -Keep HOB at 30 degrees     Toileting:  -Offer bedside commode  -Assess for incontinence every shift  -Use incontinent care products after each incontinent episode such as barrier creams    Activity:  -Mobilize patient 3 times a day  -Encourage activity and walks on unit  -Encourage or provide ROM exercises   -Turn and reposition patient every 2 Hours  -Use appropriate equipment to lift or move patient in bed  -Instruct/ Assist with weight shifting every shift when out of bed in chair  -Consider limitation of chair time 2 hour intervals    Skin Care:  -Avoid use of baby powder, tape, friction and shearing, hot water or constrictive clothing  -Relieve pressure over bony prominences using waffle cushion  -Do not massage red bony areas    Next Steps:  -Teach patient strategies to minimize risks such as repositioning   -Consider consults to  interdisciplinary teams such as PT/OT  Outcome: Progressing  Goal: Incision(s), wounds(s) or drain site(s) healing without S/S of infection  Description: INTERVENTIONS  - Assess and document dressing, incision, wound bed, drain sites and surrounding tissue  - Provide patient and family education  - Perform skin care/dressing changes every shift  Outcome: Progressing  Goal: Pressure injury heals and does not worsen  Description: Interventions:  - Implement low air loss mattress or specialty surface (Criteria met)  - Apply silicone foam dressing  - Instruct/assist with weight shifting every 90 minutes when in chair   - Limit chair time to 2 hour intervals  - Use special pressure reducing interventions such as waffle cushion when in chair   - Apply fecal or urinary incontinence containment device   - Perform passive or active ROM every shift  - Turn and reposition patient & offload bony prominences every 2 hours   - Utilize friction reducing device or surface for transfers   - Consider consults to  interdisciplinary teams such as PT  - Use incontinent care products after each incontinent episode such as barrier creams  - Consider nutrition services referral as needed  Outcome: Progressing     Problem: SAFETY ADULT  Goal: Patient will remain free of falls  Description: INTERVENTIONS:  - Educate patient/family on patient safety including physical limitations  - Instruct patient to call for assistance with activity   - Consult OT/PT to assist with strengthening/mobility   - Keep Call bell within reach  - Keep bed low and locked with side rails adjusted as appropriate  - Keep care items and personal belongings within reach  - Initiate and maintain comfort rounds  - Make Fall Risk Sign visible to staff  - Offer Toileting every 2 Hours, in advance of need  - Initiate/Maintain bed alarm  - Obtain necessary fall risk management equipment: yellow socks  - Apply yellow socks and bracelet for high fall risk patients  - Consider moving patient to room near nurses station  Outcome: Progressing  Goal: Maintain or return to baseline ADL function  Description: INTERVENTIONS:  -  Assess patient's ability to carry out ADLs; assess patient's baseline for ADL function and identify physical deficits which impact ability to perform ADLs (bathing, care of mouth/teeth, toileting, grooming, dressing, etc.)  - Assess/evaluate cause of self-care deficits   - Assess range of motion  - Assess patient's mobility; develop plan if impaired  - Assess patient's need for assistive devices and provide as appropriate  - Encourage maximum independence but intervene and supervise when necessary  - Involve family in performance of ADLs  - Assess for home care needs following discharge   - Consider OT consult to assist with ADL evaluation and planning for discharge  - Provide patient education as appropriate  Outcome: Progressing  Goal: Maintains/Returns to pre admission functional level  Description: INTERVENTIONS:  - Perform BMAT or MOVE assessment daily.   - Set and communicate daily mobility goal to care team and patient/family/caregiver. - Collaborate with rehabilitation services on mobility goals if consulted  - Perform Range of Motion 3 times a day. - Reposition patient every 2 hours.   - Dangle patient 3 times a day  - Stand patient 3 times a day  - Ambulate patient 3 times a day  - Out of bed to chair 3 times a day   - Out of bed for meals 3 times a day  - Out of bed for toileting  - Record patient progress and toleration of activity level   Outcome: Progressing     Problem: SAFETY,RESTRAINT: NV/NON-SELF DESTRUCTIVE BEHAVIOR  Goal: Remains free of harm/injury (restraint for non violent/non self-detsructive behavior)  Description: INTERVENTIONS:  - Instruct patient/family regarding restraint use   - Assess and monitor physiologic and psychological status   - Provide interventions and comfort measures to meet assessed patient needs   - Identify and implement measures to help patient regain control  - Assess readiness for release of restraint   Outcome: Progressing  Goal: Returns to optimal restraint-free functioning  Description: INTERVENTIONS:  - Assess the patient's behavior and symptoms that indicate continued need for restraint  - Identify and implement measures to help patient regain control  - Assess readiness for release of restraint   Outcome: Progressing     Problem: Nutrition/Hydration-ADULT  Goal: Nutrient/Hydration intake appropriate for improving, restoring or maintaining nutritional needs  Description: Monitor and assess patient's nutrition/hydration status for malnutrition. Collaborate with interdisciplinary team and initiate plan and interventions as ordered. Monitor patient's weight and dietary intake as ordered or per policy. Utilize nutrition screening tool and intervene as necessary. Determine patient's food preferences and provide high-protein, high-caloric foods as appropriate.      INTERVENTIONS:  - Monitor oral intake, urinary output, labs, and treatment plans  - Assess nutrition and hydration status and recommend course of action  - Evaluate amount of meals eaten  - Assist patient with eating if necessary   - Allow adequate time for meals  - Recommend/ encourage appropriate diets, oral nutritional supplements, and vitamin/mineral supplements  - Order, calculate, and assess calorie counts as needed  - Recommend, monitor, and adjust tube feedings and TPN/PPN based on assessed needs  - Assess need for intravenous fluids  - Provide specific nutrition/hydration education as appropriate  - Include patient/family/caregiver in decisions related to nutrition  Outcome: Progressing     Problem: MOBILITY - ADULT  Goal: Maintain or return to baseline ADL function  Description: INTERVENTIONS:  -  Assess patient's ability to carry out ADLs; assess patient's baseline for ADL function and identify physical deficits which impact ability to perform ADLs (bathing, care of mouth/teeth, toileting, grooming, dressing, etc.)  - Assess/evaluate cause of self-care deficits   - Assess range of motion  - Assess patient's mobility; develop plan if impaired  - Assess patient's need for assistive devices and provide as appropriate  - Encourage maximum independence but intervene and supervise when necessary  - Involve family in performance of ADLs  - Assess for home care needs following discharge   - Consider OT consult to assist with ADL evaluation and planning for discharge  - Provide patient education as appropriate  Outcome: Progressing  Goal: Maintains/Returns to pre admission functional level  Description: INTERVENTIONS:  - Perform BMAT or MOVE assessment daily.   - Set and communicate daily mobility goal to care team and patient/family/caregiver. - Collaborate with rehabilitation services on mobility goals if consulted  - Perform Range of Motion 3 times a day. - Reposition patient every 2 hours.   - Dangle patient 3 times a day  - Stand patient 3 times a day  - Ambulate patient 3 times a day  - Out of bed to chair 3 times a day   - Out of bed for meals 3 times a day  - Out of bed for toileting  - Record patient progress and toleration of activity level   Outcome: Progressing     Problem: Prexisting or High Potential for Compromised Skin Integrity  Goal: Skin integrity is maintained or improved  Description: INTERVENTIONS:  - Identify patients at risk for skin breakdown  - Assess and monitor skin integrity  - Assess and monitor nutrition and hydration status  - Monitor labs   - Assess for incontinence   - Turn and reposition patient  - Assist with mobility/ambulation  - Relieve pressure over bony prominences  - Avoid friction and shearing  - Provide appropriate hygiene as needed including keeping skin clean and dry  - Evaluate need for skin moisturizer/barrier cream  - Collaborate with interdisciplinary team   - Patient/family teaching  - Consider wound care consult   Outcome: Progressing

## 2023-10-02 NOTE — ASSESSMENT & PLAN NOTE
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 Patient requires 100% total assistance with ADLs.       · Continue above management such as frequent repositioning, bilateral wrist restraints, tube feeding and etc

## 2023-10-02 NOTE — PROGRESS NOTES
Progress Note - Urology      Patient: Miesha Kearns   : 1940 Sex: male   MRN: [id number]     CSN: [id number]  Unit/Bed#: 2 [place] [address]     SUBJECTIVE:   Patient seen on morning  CBI stopped yesterday confirming clear urine White count trending upc  CAT scan repeated showing no abnormalities more than likely patient had urinary tract infection when traumatically pulling Lieberman into the bladder neck    Objective   Vitals: /63   Pulse 76   Temp 98.6 °F (37 °C)   Resp 15   Ht 5' 11" (1.803 m)   Wt 86.2 kg (190 lb)   SpO2 96%   BMI 26.50 kg/m²     I/O last 24 hours:   In: 1390 [NG/GT:1390]  Out: 750 [Urine:750]      Physical Exam:   General Alert awake   Normocephalic atraumatic PERRLA  Lungs clear bilaterally  Cardiac normal S1 normal S2  Abdomen soft, flank pain  Extremities no edema      Lab Results: CBC:   Lab Results   Component Value Date    WBC 18.50 (H) 10/02/2023    HGB 9.1 (L) 10/02/2023    HCT 29.5 (L) 10/02/2023    MCV 88 10/02/2023     10/02/2023    RBC 3.34 (L) 10/02/2023    MCH 27.2 10/02/2023    MCHC 30.8 (L) 10/02/2023    RDW 16.6 (H) 10/02/2023    MPV 9.7 10/02/2023    NRBC 0 10/02/2023     CMP:   Lab Results   Component Value Date     (H) 10/02/2023    CO2 25 10/02/2023    BUN 44 (H) 10/02/2023    CREATININE 2.11 (H) 10/02/2023    CALCIUM 9.2 10/02/2023    AST 21 2023    ALT 10 2023    ALKPHOS 46 2023    EGFR 28 10/02/2023     Urinalysis:   Lab Results   Component Value Date    COLORU Red 2023    CLARITYU Cloudy 2023    SPECGRAV 1.010 2023    PHUR 6.5 2023    LEUKOCYTESUR Interference- unable to analyze (A) 2023    NITRITE Interference- unable to analyze 2023    GLUCOSEU Negative 2023    KETONESU Interference- unable to analyze (A) 2023    BILIRUBINUR Interference- unable to analyze (A) 2023    BLOODU Interference- unable to analyze (A) 2023     Urine Culture:   Lab Results   Component Value Date    URINECX 10,000-19,000 cfu/ml Pseudomonas aeruginosa (A) 09/29/2023    URINECX <10,000 cfu/ml Providencia stuartii (A) 09/29/2023    URINECX <10,000 cfu/ml Proteus species (A) 09/29/2023     PSA: No results found for: "PSA"      Assessment/ Plan:  Complicated UTI  Traumatic Lieberman  CAT scan no upper tract abnormalities  Continue IV cefepime  Repeat culture on Tuesday  If culture negative by Thursday we will take to the OR for cystoscopy possible transurethral resection bladder neck contracture in light of 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

## 2023-10-03 LAB
ALBUMIN SERPL BCP-MCNC: 2.7 G/DL (ref 3.5–5)
ALP SERPL-CCNC: 44 U/L (ref 34–104)
ALT SERPL W P-5'-P-CCNC: 8 U/L (ref 7–52)
ANION GAP SERPL CALCULATED.3IONS-SCNC: 7 MMOL/L
AST SERPL W P-5'-P-CCNC: 22 U/L (ref 13–39)
BASOPHILS # BLD AUTO: 0.06 THOUSANDS/ÂΜL (ref 0–0.1)
BASOPHILS NFR BLD AUTO: 1 % (ref 0–1)
BILIRUB SERPL-MCNC: 0.28 MG/DL (ref 0.2–1)
BUN SERPL-MCNC: 42 MG/DL (ref 5–25)
CALCIUM ALBUM COR SERPL-MCNC: 10.2 MG/DL (ref 8.3–10.1)
CALCIUM SERPL-MCNC: 9.2 MG/DL (ref 8.4–10.2)
CHLORIDE SERPL-SCNC: 111 MMOL/L (ref 96–108)
CO2 SERPL-SCNC: 26 MMOL/L (ref 21–32)
CREAT SERPL-MCNC: 2.11 MG/DL (ref 0.6–1.3)
EOSINOPHIL # BLD AUTO: 0.51 THOUSAND/ÂΜL (ref 0–0.61)
EOSINOPHIL NFR BLD AUTO: 4 % (ref 0–6)
ERYTHROCYTE [DISTWIDTH] IN BLOOD BY AUTOMATED COUNT: 16.5 % (ref 11.6–15.1)
GFR SERPL CREATININE-BSD FRML MDRD: 28 ML/MIN/1.73SQ M
GLUCOSE SERPL-MCNC: 131 MG/DL (ref 65–140)
GLUCOSE SERPL-MCNC: 133 MG/DL (ref 65–140)
GLUCOSE SERPL-MCNC: 141 MG/DL (ref 65–140)
GLUCOSE SERPL-MCNC: 145 MG/DL (ref 65–140)
GLUCOSE SERPL-MCNC: 147 MG/DL (ref 65–140)
GLUCOSE SERPL-MCNC: 161 MG/DL (ref 65–140)
HCT VFR BLD AUTO: 29 % (ref 36.5–49.3)
HGB BLD-MCNC: 9.2 G/DL (ref 12–17)
IMM GRANULOCYTES # BLD AUTO: 0.07 THOUSAND/UL (ref 0–0.2)
IMM GRANULOCYTES NFR BLD AUTO: 1 % (ref 0–2)
LYMPHOCYTES # BLD AUTO: 1.67 THOUSANDS/ÂΜL (ref 0.6–4.47)
LYMPHOCYTES NFR BLD AUTO: 14 % (ref 14–44)
MAGNESIUM SERPL-MCNC: 2 MG/DL (ref 1.9–2.7)
MCH RBC QN AUTO: 27.7 PG (ref 26.8–34.3)
MCHC RBC AUTO-ENTMCNC: 31.7 G/DL (ref 31.4–37.4)
MCV RBC AUTO: 87 FL (ref 82–98)
MONOCYTES # BLD AUTO: 0.87 THOUSAND/ÂΜL (ref 0.17–1.22)
MONOCYTES NFR BLD AUTO: 7 % (ref 4–12)
NEUTROPHILS # BLD AUTO: 8.8 THOUSANDS/ÂΜL (ref 1.85–7.62)
NEUTS SEG NFR BLD AUTO: 73 % (ref 43–75)
NRBC BLD AUTO-RTO: 0 /100 WBCS
PLATELET # BLD AUTO: 249 THOUSANDS/UL (ref 149–390)
PMV BLD AUTO: 9.3 FL (ref 8.9–12.7)
POTASSIUM SERPL-SCNC: 4.3 MMOL/L (ref 3.5–5.3)
PROT SERPL-MCNC: 6.9 G/DL (ref 6.4–8.4)
RBC # BLD AUTO: 3.32 MILLION/UL (ref 3.88–5.62)
SODIUM SERPL-SCNC: 144 MMOL/L (ref 135–147)
WBC # BLD AUTO: 11.98 THOUSAND/UL (ref 4.31–10.16)

## 2023-10-03 PROCEDURE — 94640 AIRWAY INHALATION TREATMENT: CPT

## 2023-10-03 PROCEDURE — 82948 REAGENT STRIP/BLOOD GLUCOSE: CPT

## 2023-10-03 PROCEDURE — 99232 SBSQ HOSP IP/OBS MODERATE 35: CPT | Performed by: INTERNAL MEDICINE

## 2023-10-03 PROCEDURE — 80053 COMPREHEN METABOLIC PANEL: CPT | Performed by: STUDENT IN AN ORGANIZED HEALTH CARE EDUCATION/TRAINING PROGRAM

## 2023-10-03 PROCEDURE — G0008 ADMIN INFLUENZA VIRUS VAC: HCPCS | Performed by: INTERNAL MEDICINE

## 2023-10-03 PROCEDURE — 83735 ASSAY OF MAGNESIUM: CPT

## 2023-10-03 PROCEDURE — 90662 IIV NO PRSV INCREASED AG IM: CPT | Performed by: INTERNAL MEDICINE

## 2023-10-03 PROCEDURE — 94760 N-INVAS EAR/PLS OXIMETRY 1: CPT

## 2023-10-03 PROCEDURE — 85025 COMPLETE CBC W/AUTO DIFF WBC: CPT

## 2023-10-03 RX ORDER — SACCHAROMYCES BOULARDII 250 MG
250 CAPSULE ORAL 2 TIMES DAILY
Status: DISCONTINUED | OUTPATIENT
Start: 2023-10-03 | End: 2023-10-09 | Stop reason: HOSPADM

## 2023-10-03 RX ADMIN — OXYBUTYNIN CHLORIDE 2.5 MG: 5 TABLET ORAL at 18:42

## 2023-10-03 RX ADMIN — AMLODIPINE BESYLATE 10 MG: 10 TABLET ORAL at 09:23

## 2023-10-03 RX ADMIN — LAMOTRIGINE 25 MG: 25 TABLET ORAL at 18:41

## 2023-10-03 RX ADMIN — OXYBUTYNIN CHLORIDE 2.5 MG: 5 TABLET ORAL at 09:24

## 2023-10-03 RX ADMIN — IPRATROPIUM BROMIDE AND ALBUTEROL SULFATE 3 ML: 2.5; .5 SOLUTION RESPIRATORY (INHALATION) at 07:07

## 2023-10-03 RX ADMIN — NYSTATIN: 100000 POWDER TOPICAL at 11:16

## 2023-10-03 RX ADMIN — CARBIDOPA AND LEVODOPA 1 TABLET: 25; 100 TABLET ORAL at 21:42

## 2023-10-03 RX ADMIN — FOLIC ACID 1 MG: 1 TABLET ORAL at 09:24

## 2023-10-03 RX ADMIN — NYSTATIN: 100000 POWDER TOPICAL at 15:50

## 2023-10-03 RX ADMIN — LAMOTRIGINE 25 MG: 25 TABLET ORAL at 09:23

## 2023-10-03 RX ADMIN — LABETALOL HYDROCHLORIDE 200 MG: 100 TABLET, FILM COATED ORAL at 21:45

## 2023-10-03 RX ADMIN — PIPERACILLIN AND TAZOBACTAM 3.38 G: 36; 4.5 INJECTION, POWDER, FOR SOLUTION INTRAVENOUS at 23:00

## 2023-10-03 RX ADMIN — Medication 250 MG: at 18:42

## 2023-10-03 RX ADMIN — Medication 250 MG: at 09:23

## 2023-10-03 RX ADMIN — LABETALOL HYDROCHLORIDE 200 MG: 100 TABLET, FILM COATED ORAL at 14:47

## 2023-10-03 RX ADMIN — Medication 6 MG: at 21:43

## 2023-10-03 RX ADMIN — LABETALOL HYDROCHLORIDE 200 MG: 100 TABLET, FILM COATED ORAL at 06:40

## 2023-10-03 RX ADMIN — CEFEPIME HYDROCHLORIDE 1000 MG: 1 INJECTION, SOLUTION INTRAVENOUS at 14:47

## 2023-10-03 RX ADMIN — CARBIDOPA AND LEVODOPA 1 TABLET: 25; 100 TABLET ORAL at 09:23

## 2023-10-03 RX ADMIN — IPRATROPIUM BROMIDE AND ALBUTEROL SULFATE 3 ML: 2.5; .5 SOLUTION RESPIRATORY (INHALATION) at 13:08

## 2023-10-03 RX ADMIN — BUDESONIDE 0.25 MG: 0.25 INHALANT ORAL at 20:12

## 2023-10-03 RX ADMIN — IPRATROPIUM BROMIDE AND ALBUTEROL SULFATE 3 ML: 2.5; .5 SOLUTION RESPIRATORY (INHALATION) at 20:12

## 2023-10-03 RX ADMIN — SERTRALINE HYDROCHLORIDE 100 MG: 100 TABLET ORAL at 09:23

## 2023-10-03 RX ADMIN — INFLUENZA A VIRUS A/VICTORIA/4897/2022 IVR-238 (H1N1) ANTIGEN (FORMALDEHYDE INACTIVATED), INFLUENZA A VIRUS A/DARWIN/9/2021 SAN-010 (H3N2) ANTIGEN (FORMALDEHYDE INACTIVATED), INFLUENZA B VIRUS B/PHUKET/3073/2013 ANTIGEN (FORMALDEHYDE INACTIVATED), AND INFLUENZA B VIRUS B/MICHIGAN/01/2021 ANTIGEN (FORMALDEHYDE INACTIVATED) 0.7 ML: 60; 60; 60; 60 INJECTION, SUSPENSION INTRAMUSCULAR at 15:53

## 2023-10-03 RX ADMIN — NYSTATIN: 100000 POWDER TOPICAL at 21:43

## 2023-10-03 RX ADMIN — PIPERACILLIN AND TAZOBACTAM 3.38 G: 36; 4.5 INJECTION, POWDER, FOR SOLUTION INTRAVENOUS at 17:02

## 2023-10-03 RX ADMIN — LATANOPROST 1 DROP: 50 SOLUTION OPHTHALMIC at 21:44

## 2023-10-03 RX ADMIN — SENNOSIDES 8.8 MG: 8.8 SYRUP ORAL at 21:44

## 2023-10-03 RX ADMIN — BUDESONIDE 0.25 MG: 0.25 INHALANT ORAL at 07:07

## 2023-10-03 RX ADMIN — CARBIDOPA AND LEVODOPA 1 TABLET: 25; 100 TABLET ORAL at 16:01

## 2023-10-03 NOTE — PLAN OF CARE
Problem: Potential for Falls  Goal: Patient will remain free of falls  Description: INTERVENTIONS:  - Educate patient/family on patient safety including physical limitations  - Instruct patient to call for assistance with activity   - Consult OT/PT to assist with strengthening/mobility   - Keep Call bell within reach  - Keep bed low and locked with side rails adjusted as appropriate  - Keep care items and personal belongings within reach  - Initiate and maintain comfort rounds  - Make Fall Risk Sign visible to staff  - Offer Toileting every 2 Hours, in advance of need  - Initiate/Maintain bed alarm  - Apply yellow socks and bracelet for high fall risk patients  - Consider moving patient to room near nurses station  Outcome: Progressing     Problem: RESPIRATORY - ADULT  Goal: Achieves optimal ventilation and oxygenation  Description: INTERVENTIONS:  - Assess for changes in respiratory status  - Assess for changes in mentation and behavior  - Position to facilitate oxygenation and minimize respiratory effort  - Oxygen administered by appropriate delivery if ordered  - Initiate smoking cessation education as indicated  - Encourage broncho-pulmonary hygiene including cough, deep breathe, Incentive Spirometry  - Assess the need for suctioning and aspirate as needed  - Assess and instruct to report SOB or any respiratory difficulty  - Respiratory Therapy support as indicated  Outcome: Progressing     Problem: GENITOURINARY - ADULT  Goal: Maintains or returns to baseline urinary function  Description: INTERVENTIONS:  - Assess urinary function  - Encourage oral fluids to ensure adequate hydration if ordered  - Administer IV fluids as ordered to ensure adequate hydration  - Administer ordered medications as needed  - Offer frequent toileting  - Follow urinary retention protocol if ordered  Outcome: Progressing  Goal: Absence of urinary retention  Description: INTERVENTIONS:  - Assess patient’s ability to void and empty bladder  - Monitor I/O  - Bladder scan as needed  - Discuss with physician/AP medications to alleviate retention as needed  - Discuss catheterization for long term situations as appropriate  Outcome: Progressing  Goal: Urinary catheter remains patent  Description: INTERVENTIONS:  - Assess patency of urinary catheter  - If patient has a chronic vazquez, consider changing catheter if non-functioning  - Follow guidelines for intermittent irrigation of non-functioning urinary catheter  Outcome: Progressing     Problem: SAFETY ADULT  Goal: Patient will remain free of falls  Description: INTERVENTIONS:  - Educate patient/family on patient safety including physical limitations  - Instruct patient to call for assistance with activity   - Consult OT/PT to assist with strengthening/mobility   - Keep Call bell within reach  - Keep bed low and locked with side rails adjusted as appropriate  - Keep care items and personal belongings within reach  - Initiate and maintain comfort rounds  - Make Fall Risk Sign visible to staff  - Offer Toileting every 2 Hours, in advance of need  - Initiate/Maintain bed alarm  - Apply yellow socks and bracelet for high fall risk patients  - Consider moving patient to room near nurses station  Outcome: Progressing  Goal: Maintain or return to baseline ADL function  Description: INTERVENTIONS:  -  Assess patient's ability to carry out ADLs; assess patient's baseline for ADL function and identify physical deficits which impact ability to perform ADLs (bathing, care of mouth/teeth, toileting, grooming, dressing, etc.)  - Assess/evaluate cause of self-care deficits   - Assess range of motion  - Assess patient's mobility; develop plan if impaired  - Assess patient's need for assistive devices and provide as appropriate  - Encourage maximum independence but intervene and supervise when necessary  - Involve family in performance of ADLs  - Assess for home care needs following discharge   - Consider OT consult to assist with ADL evaluation and planning for discharge  - Provide patient education as appropriate  Outcome: Progressing  Goal: Maintains/Returns to pre admission functional level  Description: INTERVENTIONS:  - Perform BMAT or MOVE assessment daily.   - Set and communicate daily mobility goal to care team and patient/family/caregiver. - Collaborate with rehabilitation services on mobility goals if consulted    Problem: SAFETY,RESTRAINT: NV/NON-SELF DESTRUCTIVE BEHAVIOR  Goal: Remains free of harm/injury (restraint for non violent/non self-detsructive behavior)  Description: INTERVENTIONS:  - Instruct patient/family regarding restraint use   - Assess and monitor physiologic and psychological status   - Provide interventions and comfort measures to meet assessed patient needs   - Identify and implement measures to help patient regain control  - Assess readiness for release of restraint   Outcome: Progressing  Goal: Returns to optimal restraint-free functioning  Description: INTERVENTIONS:  - Assess the patient's behavior and symptoms that indicate continued need for restraint  - Identify and implement measures to help patient regain control  - Assess readiness for release of restraint   Outcome: Progressing     Problem: Nutrition/Hydration-ADULT  Goal: Nutrient/Hydration intake appropriate for improving, restoring or maintaining nutritional needs  Description: Monitor and assess patient's nutrition/hydration status for malnutrition. Collaborate with interdisciplinary team and initiate plan and interventions as ordered. Monitor patient's weight and dietary intake as ordered or per policy. Utilize nutrition screening tool and intervene as necessary. Determine patient's food preferences and provide high-protein, high-caloric foods as appropriate.      INTERVENTIONS:  - Monitor oral intake, urinary output, labs, and treatment plans  - Assess nutrition and hydration status and recommend course of action  - Evaluate amount of meals eaten  - Assist patient with eating if necessary   - Allow adequate time for meals  - Recommend/ encourage appropriate diets, oral nutritional supplements, and vitamin/mineral supplements  - Order, calculate, and assess calorie counts as needed  - Recommend, monitor, and adjust tube feedings based on needs  - Assess need for intravenous fluids  - Provide specific nutrition/hydration education as appropriate  - Include patient/family/caregiver in decisions related to nutrition  Outcome: Progressing     Problem: MOBILITY - ADULT  Goal: Maintain or return to baseline ADL function  Description: INTERVENTIONS:  -  Assess patient's ability to carry out ADLs; assess patient's baseline for ADL function and identify physical deficits which impact ability to perform ADLs (bathing, care of mouth/teeth, toileting, grooming, dressing, etc.)  - Assess/evaluate cause of self-care deficits   - Assess range of motion  - Assess patient's mobility; develop plan if impaired  - Assess patient's need for assistive devices and provide as appropriate  - Encourage maximum independence but intervene and supervise when necessary  - Involve family in performance of ADLs  - Assess for home care needs following discharge   - Consider OT consult to assist with ADL evaluation and planning for discharge  - Provide patient education as appropriate  Outcome: Progressing  Goal: Maintains/Returns to pre admission functional level  Description: INTERVENTIONS:  - Perform BMAT or MOVE assessment daily.   - Set and communicate daily mobility goal to care team and patient/family/caregiver.    - Collaborate with rehabilitation services on mobility goals if consulted  Problem: Prexisting or High Potential for Compromised Skin Integrity  Goal: Skin integrity is maintained or improved  Description: INTERVENTIONS:  - Identify patients at risk for skin breakdown  - Assess and monitor skin integrity  - Assess and monitor nutrition and hydration status  - Monitor labs   - Assess for incontinence   - Turn and reposition patient  - Assist with mobility/ambulation  - Relieve pressure over bony prominences  - Avoid friction and shearing  - Provide appropriate hygiene as needed including keeping skin clean and dry  - Evaluate need for skin moisturizer/barrier cream  - Collaborate with interdisciplinary team   - Patient/family teaching  - Consider wound care consult   Outcome: Progressing     - Out of bed for toileting  - Record patient progress and toleration of activity level   Outcome: Progressing   - Out of bed for toileting  - Record patient progress and toleration of activity level   Outcome: Progressing

## 2023-10-03 NOTE — PLAN OF CARE
Problem: Potential for Falls  Goal: Patient will remain free of falls  Description: INTERVENTIONS:  - Educate patient/family on patient safety including physical limitations  - Instruct patient to call for assistance with activity   - Consult OT/PT to assist with strengthening/mobility   - Keep Call bell within reach  - Keep bed low and locked with side rails adjusted as appropriate  - Keep care items and personal belongings within reach  - Initiate and maintain comfort rounds  - Make Fall Risk Sign visible to staff  - Offer Toileting every  Hours, in advance of need  - Initiate/Maintain alarm  - Obtain necessary fall risk management equipment:   - Apply yellow socks and bracelet for high fall risk patients  - Consider moving patient to room near nurses station  Outcome: Progressing     Problem: RESPIRATORY - ADULT  Goal: Achieves optimal ventilation and oxygenation  Description: INTERVENTIONS:  - Assess for changes in respiratory status  - Assess for changes in mentation and behavior  - Position to facilitate oxygenation and minimize respiratory effort  - Oxygen administered by appropriate delivery if ordered  - Initiate smoking cessation education as indicated  - Encourage broncho-pulmonary hygiene including cough, deep breathe, Incentive Spirometry  - Assess the need for suctioning and aspirate as needed  - Assess and instruct to report SOB or any respiratory difficulty  - Respiratory Therapy support as indicated  Outcome: Progressing     Problem: GENITOURINARY - ADULT  Goal: Maintains or returns to baseline urinary function  Description: INTERVENTIONS:  - Assess urinary function  - Encourage oral fluids to ensure adequate hydration if ordered  - Administer IV fluids as ordered to ensure adequate hydration  - Administer ordered medications as needed  - Offer frequent toileting  - Follow urinary retention protocol if ordered  Outcome: Progressing  Goal: Absence of urinary retention  Description: INTERVENTIONS:  - Assess patient’s ability to void and empty bladder  - Monitor I/O  - Bladder scan as needed  - Discuss with physician/AP medications to alleviate retention as needed  - Discuss catheterization for long term situations as appropriate  Outcome: Progressing  Goal: Urinary catheter remains patent  Description: INTERVENTIONS:  - Assess patency of urinary catheter  - If patient has a chronic vazquez, consider changing catheter if non-functioning  - Follow guidelines for intermittent irrigation of non-functioning urinary catheter  Outcome: Progressing     Problem: SKIN/TISSUE INTEGRITY - ADULT  Goal: Skin Integrity remains intact(Skin Breakdown Prevention)  Description: Assess:  -Perform Henry assessment every   -Clean and moisturize skin every   -Inspect skin when repositioning, toileting, and assisting with ADLS  -Assess under medical devices such as  every   -Assess extremities for adequate circulation and sensation     Bed Management:  -Have minimal linens on bed & keep smooth, unwrinkled  -Change linens as needed when moist or perspiring  -Avoid sitting or lying in one position for more than  hours while in bed  -Keep HOB at degrees     Toileting:  -Offer bedside commode  -Assess for incontinence every   -Use incontinent care products after each incontinent episode such as     Activity:  -Mobilize patient  times a day  -Encourage activity and walks on unit  -Encourage or provide ROM exercises   -Turn and reposition patient every  Hours  -Use appropriate equipment to lift or move patient in bed  -Instruct/ Assist with weight shifting every  when out of bed in chair  -Consider limitation of chair time  hour intervals    Skin Care:  -Avoid use of baby powder, tape, friction and shearing, hot water or constrictive clothing  -Relieve pressure over bony prominences using   -Do not massage red bony areas    Next Steps:  -Teach patient strategies to minimize risks such as    -Consider consults to interdisciplinary teams such as   Outcome: Progressing  Goal: Incision(s), wounds(s) or drain site(s) healing without S/S of infection  Description: INTERVENTIONS  - Assess and document dressing, incision, wound bed, drain sites and surrounding tissue  - Provide patient and family education  - Perform skin care/dressing changes every   Outcome: Progressing  Goal: Pressure injury heals and does not worsen  Description: Interventions:  - Implement low air loss mattress or specialty surface (Criteria met)  - Apply silicone foam dressing  - Instruct/assist with weight shifting every  minutes when in chair   - Limit chair time to  hour intervals  - Use special pressure reducing interventions such as  when in chair   - Apply fecal or urinary incontinence containment device   - Perform passive or active ROM every   - Turn and reposition patient & offload bony prominences every  hours   - Utilize friction reducing device or surface for transfers   - Consider consults to  interdisciplinary teams such as   - Use incontinent care products after each incontinent episode such as   - Consider nutrition services referral as needed  Outcome: Progressing     Problem: SAFETY ADULT  Goal: Patient will remain free of falls  Description: INTERVENTIONS:  - Educate patient/family on patient safety including physical limitations  - Instruct patient to call for assistance with activity   - Consult OT/PT to assist with strengthening/mobility   - Keep Call bell within reach  - Keep bed low and locked with side rails adjusted as appropriate  - Keep care items and personal belongings within reach  - Initiate and maintain comfort rounds  - Make Fall Risk Sign visible to staff  - Offer Toileting every  Hours, in advance of need  - Initiate/Maintain alarm  - Obtain necessary fall risk management equipment:   - Apply yellow socks and bracelet for high fall risk patients  - Consider moving patient to room near nurses station  Outcome: Progressing  Goal: Maintain or return to baseline ADL function  Description: INTERVENTIONS:  -  Assess patient's ability to carry out ADLs; assess patient's baseline for ADL function and identify physical deficits which impact ability to perform ADLs (bathing, care of mouth/teeth, toileting, grooming, dressing, etc.)  - Assess/evaluate cause of self-care deficits   - Assess range of motion  - Assess patient's mobility; develop plan if impaired  - Assess patient's need for assistive devices and provide as appropriate  - Encourage maximum independence but intervene and supervise when necessary  - Involve family in performance of ADLs  - Assess for home care needs following discharge   - Consider OT consult to assist with ADL evaluation and planning for discharge  - Provide patient education as appropriate  Outcome: Progressing  Goal: Maintains/Returns to pre admission functional level  Description: INTERVENTIONS:  - Perform BMAT or MOVE assessment daily.   - Set and communicate daily mobility goal to care team and patient/family/caregiver. - Collaborate with rehabilitation services on mobility goals if consulted  - Perform Range of Motion  times a day. - Reposition patient every  hours.   - Dangle patient  times a day  - Stand patient  times a day  - Ambulate patient  times a day  - Out of bed to chair  times a day   - Out of bed for meals  times a day  - Out of bed for toileting  - Record patient progress and toleration of activity level   Outcome: Progressing     Problem: SAFETY,RESTRAINT: NV/NON-SELF DESTRUCTIVE BEHAVIOR  Goal: Remains free of harm/injury (restraint for non violent/non self-detsructive behavior)  Description: INTERVENTIONS:  - Instruct patient/family regarding restraint use   - Assess and monitor physiologic and psychological status   - Provide interventions and comfort measures to meet assessed patient needs   - Identify and implement measures to help patient regain control  - Assess readiness for release of restraint   Outcome: Progressing  Goal: Returns to optimal restraint-free functioning  Description: INTERVENTIONS:  - Assess the patient's behavior and symptoms that indicate continued need for restraint  - Identify and implement measures to help patient regain control  - Assess readiness for release of restraint   Outcome: Progressing     Problem: Nutrition/Hydration-ADULT  Goal: Nutrient/Hydration intake appropriate for improving, restoring or maintaining nutritional needs  Description: Monitor and assess patient's nutrition/hydration status for malnutrition. Collaborate with interdisciplinary team and initiate plan and interventions as ordered. Monitor patient's weight and dietary intake as ordered or per policy. Utilize nutrition screening tool and intervene as necessary. Determine patient's food preferences and provide high-protein, high-caloric foods as appropriate.      INTERVENTIONS:  - Monitor oral intake, urinary output, labs, and treatment plans  - Assess nutrition and hydration status and recommend course of action  - Evaluate amount of meals eaten  - Assist patient with eating if necessary   - Allow adequate time for meals  - Recommend/ encourage appropriate diets, oral nutritional supplements, and vitamin/mineral supplements  - Order, calculate, and assess calorie counts as needed  - Recommend, monitor, and adjust tube feedings and TPN/PPN based on assessed needs  - Assess need for intravenous fluids  - Provide specific nutrition/hydration education as appropriate  - Include patient/family/caregiver in decisions related to nutrition  Outcome: Progressing     Problem: MOBILITY - ADULT  Goal: Maintain or return to baseline ADL function  Description: INTERVENTIONS:  -  Assess patient's ability to carry out ADLs; assess patient's baseline for ADL function and identify physical deficits which impact ability to perform ADLs (bathing, care of mouth/teeth, toileting, grooming, dressing, etc.)  - Assess/evaluate cause of self-care deficits   - Assess range of motion  - Assess patient's mobility; develop plan if impaired  - Assess patient's need for assistive devices and provide as appropriate  - Encourage maximum independence but intervene and supervise when necessary  - Involve family in performance of ADLs  - Assess for home care needs following discharge   - Consider OT consult to assist with ADL evaluation and planning for discharge  - Provide patient education as appropriate  Outcome: Progressing  Goal: Maintains/Returns to pre admission functional level  Description: INTERVENTIONS:  - Perform BMAT or MOVE assessment daily.   - Set and communicate daily mobility goal to care team and patient/family/caregiver. - Collaborate with rehabilitation services on mobility goals if consulted  - Perform Range of Motion  times a day. - Reposition patient every  hours.   - Dangle patient  times a day  - Stand patient  times a day  - Ambulate patient  times a day  - Out of bed to chair  times a day   - Out of bed for meals times a day  - Out of bed for toileting  - Record patient progress and toleration of activity level   Outcome: Progressing     Problem: Prexisting or High Potential for Compromised Skin Integrity  Goal: Skin integrity is maintained or improved  Description: INTERVENTIONS:  - Identify patients at risk for skin breakdown  - Assess and monitor skin integrity  - Assess and monitor nutrition and hydration status  - Monitor labs   - Assess for incontinence   - Turn and reposition patient  - Assist with mobility/ambulation  - Relieve pressure over bony prominences  - Avoid friction and shearing  - Provide appropriate hygiene as needed including keeping skin clean and dry  - Evaluate need for skin moisturizer/barrier cream  - Collaborate with interdisciplinary team   - Patient/family teaching  - Consider wound care consult   Outcome: Progressing

## 2023-10-03 NOTE — PROGRESS NOTES
Daily Progress Note - [facility] Residency  Alejandra Hyatt 80 y.o. male MRN: [id number]  Unit/Bed#: [address] Encounter: [id number]  Admitting Physician: Figueroa Aguilar MD   PCP: ROSITA Monae  Date of Admission:  9/29/2023  3:48 PM     Assessment and Plan     * Gross hematuria  Assessment & Plan     Acute on chronic.      Being followed per urology. Noted to have Pseudomonas in his urine cultures. Pt gross hematuria has improved. Noted no blood clots  in catheter. CT abdomen: No hydronephrosis1.2 cm hyperattenuating focal lesion may be a hemorrhagic cyst versus a solid lesion, stable can be assessed with the MRI performed on nonemergent basis. Markedly limited evaluation of the urinary bladder if needed the urinary bladder can be assessed with the ultrasound. • Continue Cefepime 1 g, IV, Q12H   • Hold Eliquis   • Being followed per urology; Recommendations appreciated\  • Maintain site clean   • Cystoscopy on Wednesday or Thursday and if found having bladder neck contracture, would need to have the contracture opened to make catheter insertion easier in the future   • Continue Vazquez with antibiotics prophylaxis  • Soft restraints, since patient keeps pulling out the catheter     Catheter-associated urinary tract infection   Assessment & Plan     Patient has a history of developing chronic UTIs. UA showed red,cloudy, turbid urine, nitrite negative, but the results showed interference - unable to analyze. No fever overnight. • Vazquez's catheter changed from 18 to 22  • Continue Cefepime 1 g, IV, Q12H  • Patient pulling out vazquez's catheter, put soft restraints  • Follow urine culture  ?  1st: > 100,000 cfu Pseudomonas  ? 2nd: 10,000 - 19,000 cfu Pseudomonas   • Urology following, appreciate recommendations  • See plan under gross hematuria     Depression  Assessment & Plan     Chronic, stable     • Continue home dose of Zoloft 100 mg daily    Functional quadriplegia   Assessment & Plan             Chronic. 2ry to Parkinsons with severe dementia as evidenced by requiring 100% total assistance with ADLs and maintaining safety, incontinence, and feeding tube, treated with providing % total care, tube feeding, turn and repositioning, and bilateral wrist restraints. · Continue above management     Dry eyes  Assessment & Plan     Patient has a chronic problem of having dry eyes     • Continue putting artificial tears, 1 drop in both eyes as needed     Gastroesophageal reflux disease  Assessment & Plan     Chronic, stable     • Continue home dose of famotidine 20 mg every other day     Chronic obstructive pulmonary disease (HCC)  Assessment & Plan     Chronic, stable. No wheezing on exam     • Continue home medications     Pressure injury of skin of right hip  Assessment & Plan     Patient has a chronic sacral wound, long with a right hip wound with skin peeling off indicating signs of pressure injury     • Reposition every 2 hours  • Consult for wound care placed  • Monitor closely     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HCC)  Assessment & Plan     Chronic, stable     • Gastrostomy tube in place  • Continue tube feeds per nutrition recommendations      Dysphagia  Assessment & Plan     Patient is unable to swallow his medication, failed speech evaluation at his prior hospital admission. G-tube was placed.       • Follow Speech and Swallow recommendations     Severe dementia [facility])  Assessment & Plan     Chronic, upon chart review, no documentation was found for absence seizures, EEG or neurology note.  Patient sees a neurologist for his Parkinson's.     • Continue Zoloft 100 mg daily     Chronic kidney disease  Assessment & Plan     Chronic, stable  Baseline Cr around 2.0.      • Avoid nephrotoxic drugs  • Avoid dehydration  • Avoid hypotension   • Trend creatinine levels  • Dose adjustment for medications as appropriate     Hypertension  Assessment & Plan     Chronic, stable     /65 mmHg.      • Cabetalol 200 mg every 8 hourly  • Monitor blood pressure     PD (Parkinson's disease)  Assessment & Plan     Chronic, stable - resting tremors present in all 4 limbs, more pronounced on his left leg. Patient has been on carbidopa levodopa for a long time. • Continue home medications    VTE Pharmacologic Prophylaxis: VTE Score: 11 High Risk (Score >/= 5) - Pharmacological DVT Prophylaxis Contraindicated. Sequential Compression Devices Ordered. Patient Centered Rounds: I have performed bedside rounds with nursing staff today. Discussions with Specialists or Other Care Team Provider: Urology    Education and Discussions with Family / Patient: Daughter   Patient Information Sharing: With the consent of Osmel Shah , their loved ones (daughter) were notified today by inpatient team of the patient’s condition and current plan. All questions answered. Time Spent for Care: 35 minutes. More than 50% of total time spent on counseling and coordination of care as described above. Current Length of Stay: 4 day(s)    Current Patient Status: Inpatient   Certification Statement: The patient will continue to require additional inpatient hospital stay due to management of gross hematuria. Discharge Plan: Pending    Code Status: Level 3 - DNAR and DNI    Subjective:     Patient evaluated at bedside. Patient was awake alert and oriented to person. Patient did not appear in acute distress. Patient talkative. Refers not being in any pain. Objective      Vitals:   Temp (24hrs), Av.4 °F (36.9 °C), Min:98.1 °F (36.7 °C), Max:98.6 °F (37 °C)    Temp:  [98.1 °F (36.7 °C)-98.6 °F (37 °C)] 98.1 °F (36.7 °C)  HR:  [69-78] 70  Resp:  [15-18] 18  BP: (122-128)/(56-66) 125/56  SpO2:  [94 %-100 %] 100 %  Body mass index is 26.5 kg/m². Input and Output Summary (last 24 hours):      Intake/Output Summary (Last 24 hours) at 10/3/2023 0805  Last data filed at 10/3/2023 5400  Gross per 24 hour   Intake --   Output 1000 ml   Net -1000 ml     Physical Exam  Constitutional:       General: He is awake. He is not in acute distress. Appearance: He is overweight. He is ill-appearing. He is not toxic-appearing or diaphoretic. HENT:      Head: Normocephalic and atraumatic. Right Ear: External ear normal.      Left Ear: External ear normal.      Nose: Nose normal.      Mouth/Throat:      Mouth: Mucous membranes are dry. Comments: Dry lips  Eyes:      Conjunctiva/sclera: Conjunctivae normal.   Cardiovascular:      Rate and Rhythm: Normal rate and regular rhythm. Pulses: Normal pulses. Heart sounds: Normal heart sounds. Pulmonary:      Effort: Pulmonary effort is normal.      Breath sounds: Normal breath sounds. Abdominal:      General: Abdomen is flat. There is no distension. Palpations: Abdomen is soft. Genitourinary:     Comments: Urinary catheter in place, urine less bloody   Musculoskeletal:      Cervical back: Normal range of motion. Right lower leg: No edema. Left lower leg: No edema. Skin:     General: Skin is warm and dry. Comments: Scar - left breast   Neurological:      Mental Status: He is alert. Mental status is at baseline. He is disoriented. Psychiatric:         Mood and Affect: Mood normal.         Behavior: Behavior normal. Behavior is cooperative.      Additional Data:     Labs:    Results from last 7 days   Lab Units 10/02/23  0536   WBC Thousand/uL 18.50*   HEMOGLOBIN g/dL 9.1*   HEMATOCRIT % 29.5*   PLATELETS Thousands/uL 234   NEUTROS PCT % 78*   LYMPHS PCT % 10*   MONOS PCT % 9   EOS PCT % 1     Results from last 7 days   Lab Units 10/02/23  0536 09/30/23  0521 09/29/23  1629   POTASSIUM mmol/L 3.9   < > 4.6   CHLORIDE mmol/L 112*   < > 102   CO2 mmol/L 25   < > 26   BUN mg/dL 44*   < > 58*   CREATININE mg/dL 2.11*   < > 2.35*   CALCIUM mg/dL 9.2   < > 9.2   ALK PHOS U/L  --   --  46   ALT U/L  --   --  10   AST U/L  --   --  21 < > = values in this interval not displayed. Results from last 7 days   Lab Units 09/29/23  1629   INR  1.13     Results from last 7 days   Lab Units 10/03/23  0750 10/03/23  0539 10/02/23  2339 10/02/23  1612 10/02/23  1109   POC GLUCOSE mg/dl 131 141* 128 156* 154*     Results from last 7 days   Lab Units 09/29/23  1629   HEMOGLOBIN A1C % 6.0*     * I Have Reviewed All Lab Data Listed Above. * Additional Pertinent Lab Tests Reviewed: All Labs For Current Hospital Admission Reviewed    Imaging:    Imaging Reports Reviewed Today Include: None  Imaging Personally Reviewed by Myself Includes:   All    Recent Cultures (last 7 days):     Results from last 7 days   Lab Units 09/29/23 2023 09/29/23  1630   URINE CULTURE  10,000-19,000 cfu/ml Pseudomonas aeruginosa*  <10,000 cfu/ml Providencia stuartii*  <10,000 cfu/ml Proteus species* >100,000 cfu/ml Pseudomonas aeruginosa*  >100,000 cfu/ml Providencia stuartii*  Proteus species*     Last 24 Hours Medication List:     Current Facility-Administered Medications   Medication Dose Route Frequency Provider Last Rate   • acetaminophen  650 mg Per G Tube Q6H PRN Schuyler Milan MD     • amLODIPine  10 mg Per G Tube Daily Mary Daniel MD     • artificial tear   Both Eyes Q4H PRN Mary Daniel MD     • ascorbic acid  250 mg Per G Tube Daily Mary Daniel MD     • budesonide  0.25 mg Nebulization BID Mary Daniel MD     • carbidopa-levodopa  1 tablet Per G Tube TID Mary Daniel MD     • cefepime  1,000 mg Intravenous Q12H Dom Nicole MD 1,000 mg (10/02/23 2222)   • famotidine  20 mg Per G Tube Every Other Day Mary Daniel MD     • folic acid  1 mg Per G Tube Daily Mary Daniel MD     • ipratropium-albuterol  3 mL Nebulization TID Schuyler Milan MD     • labetalol  200 mg Per G Tube Q8H [address] Mary Daniel MD     • lamoTRIgine  25 mg Per G Tube BID Mary Daniel MD     • latanoprost  1 drop Both Eyes HS Mary Daniel MD     • magnesium hydroxide  30 mL Per G Tube Daily PRN Josh Villarreal MD     • melatonin  6 mg Per G Tube HS Josh Villarreal MD     • nystatin   Topical TID Josh Villarreal MD     • oxybutynin  2.5 mg Per G Tube BID Kate Madison MD     • senna  8.8 mg Oral HS Mel Xavier MD     • sertraline  100 mg Per G Tube Daily Josh Villarreal MD        ** Please Note: Dictation voice to text software may have been used in the creation of this document.  **    Maricruz Henning MD  10/03/23  8:05 AM

## 2023-10-03 NOTE — PLAN OF CARE
Problem: Potential for Falls  Goal: Patient will remain free of falls  Description: INTERVENTIONS:  - Educate patient/family on patient safety including physical limitations  - Instruct patient to call for assistance with activity   - Consult OT/PT to assist with strengthening/mobility   - Keep Call bell within reach  - Keep bed low and locked with side rails adjusted as appropriate  - Keep care items and personal belongings within reach  - Initiate and maintain comfort rounds  - Make Fall Risk Sign visible to staff  - Offer Toileting every 2 Hours, in advance of need  - Initiate/Maintain bed alarm  - Apply yellow socks and bracelet for high fall risk patients  - Consider moving patient to room near nurses station  Outcome: Progressing     Problem: RESPIRATORY - ADULT  Goal: Achieves optimal ventilation and oxygenation  Description: INTERVENTIONS:  - Assess for changes in respiratory status  - Assess for changes in mentation and behavior  - Position to facilitate oxygenation and minimize respiratory effort  - Oxygen administered by appropriate delivery if ordered  - Initiate smoking cessation education as indicated  - Encourage broncho-pulmonary hygiene including cough, deep breathe, Incentive Spirometry  - Assess the need for suctioning and aspirate as needed  - Assess and instruct to report SOB or any respiratory difficulty  - Respiratory Therapy support as indicated  Outcome: Progressing     Problem: GENITOURINARY - ADULT  Goal: Maintains or returns to baseline urinary function  Description: INTERVENTIONS:  - Assess urinary function  - Encourage oral fluids to ensure adequate hydration if ordered  - Administer IV fluids as ordered to ensure adequate hydration  - Administer ordered medications as needed  - Offer frequent toileting  - Follow urinary retention protocol if ordered  Outcome: Progressing  Goal: Absence of urinary retention  Description: INTERVENTIONS:  - Assess patient’s ability to void and empty bladder  - Monitor I/O  - Bladder scan as needed  - Discuss with physician/AP medications to alleviate retention as needed  - Discuss catheterization for long term situations as appropriate  Outcome: Progressing  Goal: Urinary catheter remains patent  Description: INTERVENTIONS:  - Assess patency of urinary catheter  - If patient has a chronic vazquez, consider changing catheter if non-functioning  - Follow guidelines for intermittent irrigation of non-functioning urinary catheter  Outcome: Progressing     Problem: SAFETY ADULT  Goal: Patient will remain free of falls  Description: INTERVENTIONS:  - Educate patient/family on patient safety including physical limitations  - Instruct patient to call for assistance with activity   - Consult OT/PT to assist with strengthening/mobility   - Keep Call bell within reach  - Keep bed low and locked with side rails adjusted as appropriate  - Keep care items and personal belongings within reach  - Initiate and maintain comfort rounds  - Make Fall Risk Sign visible to staff  - Offer Toileting every 2 Hours, in advance of need  - Initiate/Maintain bed alarm  - Apply yellow socks and bracelet for high fall risk patients  - Consider moving patient to room near nurses station  Outcome: Progressing  Goal: Maintain or return to baseline ADL function  Description: INTERVENTIONS:  -  Assess patient's ability to carry out ADLs; assess patient's baseline for ADL function and identify physical deficits which impact ability to perform ADLs (bathing, care of mouth/teeth, toileting, grooming, dressing, etc.)  - Assess/evaluate cause of self-care deficits   - Assess range of motion  - Assess patient's mobility; develop plan if impaired  - Assess patient's need for assistive devices and provide as appropriate  - Encourage maximum independence but intervene and supervise when necessary  - Involve family in performance of ADLs  - Assess for home care needs following discharge   - Consider OT consult to assist with ADL evaluation and planning for discharge  - Provide patient education as appropriate  Outcome: Progressing  Goal: Maintains/Returns to pre admission functional level  Description: INTERVENTIONS:  - Perform BMAT or MOVE assessment daily.   - Set and communicate daily mobility goal to care team and patient/family/caregiver.    - Collaborate with rehabilitation services on mobility goals if consulted  - Out of bed for toileting  - Record patient progress and toleration of activity level   Outcome: Progressing

## 2023-10-03 NOTE — PROGRESS NOTES
Progress Note - Urology      Patient: Tiki Guardado   : 1940 Sex: male   MRN: [id number]     CSN: [id number]  Unit/Bed#: [address] [place] [address]     SUBJECTIVE:   Seen on morning rounds  Grabbing Lieberman catheter right hand restraints dementia  Very leery about sending him out with Lieberman since daughter did not want him and may pull Lieberman and bladder neck again  Continue antibiotics  We will put on the OR schedule for Thursday cystoscopy transurethral resection bladder neck contracture retrogrades hold blood thinners      Objective   Vitals: /56 (BP Location: Right arm)   Pulse 70   Temp 98.7 °F (37.1 °C) (Axillary)   Resp 18   Ht 5' 11" (1.803 m)   Wt 86.2 kg (190 lb)   SpO2 100%   BMI 26.50 kg/m²     I/O last 24 hours:   In: 250 [NG/GT:250]  Out: 1000 [Urine:1000]      Physical Exam:   General Alert awake   Normocephalic atraumatic PERRLA  Lungs clear bilaterally  Cardiac normal S1 normal S2  Abdomen soft, flank pain  Extremities no edema      Lab Results: CBC:   Lab Results   Component Value Date    WBC 18.50 (H) 10/02/2023    HGB 9.1 (L) 10/02/2023    HCT 29.5 (L) 10/02/2023    MCV 88 10/02/2023     10/02/2023    RBC 3.34 (L) 10/02/2023    MCH 27.2 10/02/2023    MCHC 30.8 (L) 10/02/2023    RDW 16.6 (H) 10/02/2023    MPV 9.7 10/02/2023    NRBC 0 10/02/2023     CMP:   Lab Results   Component Value Date     (H) 10/03/2023    CO2 26 10/03/2023    BUN 42 (H) 10/03/2023    CREATININE 2.11 (H) 10/03/2023    CALCIUM 9.2 10/03/2023    AST 22 10/03/2023    ALT 8 10/03/2023    ALKPHOS 44 10/03/2023    EGFR 28 10/03/2023     Urinalysis:   Lab Results   Component Value Date    COLORU Red 2023    CLARITYU Cloudy 2023    SPECGRAV 1.010 2023    PHUR 6.5 2023    LEUKOCYTESUR Interference- unable to analyze (A) 2023    NITRITE Interference- unable to analyze 2023    GLUCOSEU Negative 2023    KETONESU Interference- unable to analyze (A) 2023    BILIRUBINUR Interference- unable to analyze (A) 09/29/2023    BLOODU Interference- unable to analyze (A) 09/29/2023     Urine Culture:   Lab Results   Component Value Date    URINECX 10,000-19,000 cfu/ml Pseudomonas aeruginosa (A) 09/29/2023    URINECX <10,000 cfu/ml Providencia stuartii (A) 09/29/2023    URINECX <10,000 cfu/ml Proteus species (A) 09/29/2023     PSA: No results found for: "PSA"      Assessment/ Plan:  Complicated UTI  Continue antibiotics  Difficult Lieberman cath status post radical pr ostectomy 20 years ago  Continue antibiotics  Will add to OR Thursday  Cysto/tur bladderneck contracure/retrograde thursday    Karla Nash MD

## 2023-10-03 NOTE — PROGRESS NOTES
Spiritual Care Progress Note    10/3/2023  Patient: Mace Bernheim : 1940  Admission Date & Time: 2023 1548  MRN: [id number] CSN: [id number]     visited patient per provider consult.  introduced self & role and provided empathetic presence. Patient showed some some signs of confusion and communication challenges, but was able to participate minimally in conversation. Spiritual care remains available to support.              Chaplaincy Interventions Utilized:   Empowerment: Provided chaplaincy education    Exploration: Explored emotional needs & resources    Collaboration: Consulted with interdisciplinary team    Relationship Building: Cultivated a relationship of care and support and Provided silent and supportive presence      Chaplaincy Outcomes Achieved:  Shellie     10/03/23 1500   Clinical Encounter Type   Visited With Patient   Routine Visit Introduction   Referral From Physician

## 2023-10-04 LAB
ALBUMIN SERPL BCP-MCNC: 2.7 G/DL (ref 3.5–5)
ALP SERPL-CCNC: 48 U/L (ref 34–104)
ALT SERPL W P-5'-P-CCNC: 15 U/L (ref 7–52)
ANION GAP SERPL CALCULATED.3IONS-SCNC: 7 MMOL/L
AST SERPL W P-5'-P-CCNC: 21 U/L (ref 13–39)
BASOPHILS # BLD AUTO: 0.06 THOUSANDS/ÂΜL (ref 0–0.1)
BASOPHILS NFR BLD AUTO: 1 % (ref 0–1)
BILIRUB SERPL-MCNC: 0.28 MG/DL (ref 0.2–1)
BUN SERPL-MCNC: 40 MG/DL (ref 5–25)
CALCIUM ALBUM COR SERPL-MCNC: 10.2 MG/DL (ref 8.3–10.1)
CALCIUM SERPL-MCNC: 9.2 MG/DL (ref 8.4–10.2)
CHLORIDE SERPL-SCNC: 112 MMOL/L (ref 96–108)
CO2 SERPL-SCNC: 26 MMOL/L (ref 21–32)
CREAT SERPL-MCNC: 1.82 MG/DL (ref 0.6–1.3)
EOSINOPHIL # BLD AUTO: 0.62 THOUSAND/ÂΜL (ref 0–0.61)
EOSINOPHIL NFR BLD AUTO: 6 % (ref 0–6)
ERYTHROCYTE [DISTWIDTH] IN BLOOD BY AUTOMATED COUNT: 16.6 % (ref 11.6–15.1)
GFR SERPL CREATININE-BSD FRML MDRD: 33 ML/MIN/1.73SQ M
GLUCOSE SERPL-MCNC: 133 MG/DL (ref 65–140)
GLUCOSE SERPL-MCNC: 160 MG/DL (ref 65–140)
GLUCOSE SERPL-MCNC: 162 MG/DL (ref 65–140)
GLUCOSE SERPL-MCNC: 169 MG/DL (ref 65–140)
GLUCOSE SERPL-MCNC: 169 MG/DL (ref 65–140)
GLUCOSE SERPL-MCNC: 174 MG/DL (ref 65–140)
HCT VFR BLD AUTO: 29.6 % (ref 36.5–49.3)
HGB BLD-MCNC: 9.4 G/DL (ref 12–17)
IMM GRANULOCYTES # BLD AUTO: 0.06 THOUSAND/UL (ref 0–0.2)
IMM GRANULOCYTES NFR BLD AUTO: 1 % (ref 0–2)
LYMPHOCYTES # BLD AUTO: 1.68 THOUSANDS/ÂΜL (ref 0.6–4.47)
LYMPHOCYTES NFR BLD AUTO: 15 % (ref 14–44)
MAGNESIUM SERPL-MCNC: 1.9 MG/DL (ref 1.9–2.7)
MCH RBC QN AUTO: 27.7 PG (ref 26.8–34.3)
MCHC RBC AUTO-ENTMCNC: 31.8 G/DL (ref 31.4–37.4)
MCV RBC AUTO: 87 FL (ref 82–98)
MONOCYTES # BLD AUTO: 0.9 THOUSAND/ÂΜL (ref 0.17–1.22)
MONOCYTES NFR BLD AUTO: 8 % (ref 4–12)
NEUTROPHILS # BLD AUTO: 7.92 THOUSANDS/ÂΜL (ref 1.85–7.62)
NEUTS SEG NFR BLD AUTO: 69 % (ref 43–75)
NRBC BLD AUTO-RTO: 0 /100 WBCS
PLATELET # BLD AUTO: 271 THOUSANDS/UL (ref 149–390)
PMV BLD AUTO: 9.8 FL (ref 8.9–12.7)
POTASSIUM SERPL-SCNC: 4.3 MMOL/L (ref 3.5–5.3)
PROT SERPL-MCNC: 7.1 G/DL (ref 6.4–8.4)
RBC # BLD AUTO: 3.39 MILLION/UL (ref 3.88–5.62)
SODIUM SERPL-SCNC: 145 MMOL/L (ref 135–147)
WBC # BLD AUTO: 11.24 THOUSAND/UL (ref 4.31–10.16)

## 2023-10-04 PROCEDURE — 85025 COMPLETE CBC W/AUTO DIFF WBC: CPT

## 2023-10-04 PROCEDURE — 80053 COMPREHEN METABOLIC PANEL: CPT

## 2023-10-04 PROCEDURE — 83735 ASSAY OF MAGNESIUM: CPT

## 2023-10-04 PROCEDURE — 94640 AIRWAY INHALATION TREATMENT: CPT

## 2023-10-04 PROCEDURE — 94760 N-INVAS EAR/PLS OXIMETRY 1: CPT

## 2023-10-04 PROCEDURE — 82948 REAGENT STRIP/BLOOD GLUCOSE: CPT

## 2023-10-04 PROCEDURE — 99232 SBSQ HOSP IP/OBS MODERATE 35: CPT | Performed by: INTERNAL MEDICINE

## 2023-10-04 RX ORDER — CEFAZOLIN SODIUM 2 G/50ML
2000 SOLUTION INTRAVENOUS ONCE
Status: CANCELLED | OUTPATIENT
Start: 2023-10-04 | End: 2023-10-04

## 2023-10-04 RX ADMIN — OXYBUTYNIN CHLORIDE 2.5 MG: 5 TABLET ORAL at 18:36

## 2023-10-04 RX ADMIN — IPRATROPIUM BROMIDE AND ALBUTEROL SULFATE 3 ML: 2.5; .5 SOLUTION RESPIRATORY (INHALATION) at 19:24

## 2023-10-04 RX ADMIN — CARBIDOPA AND LEVODOPA 1 TABLET: 25; 100 TABLET ORAL at 09:21

## 2023-10-04 RX ADMIN — BUDESONIDE 0.25 MG: 0.25 INHALANT ORAL at 19:24

## 2023-10-04 RX ADMIN — CARBIDOPA AND LEVODOPA 1 TABLET: 25; 100 TABLET ORAL at 15:45

## 2023-10-04 RX ADMIN — LABETALOL HYDROCHLORIDE 200 MG: 100 TABLET, FILM COATED ORAL at 15:45

## 2023-10-04 RX ADMIN — FOLIC ACID 1 MG: 1 TABLET ORAL at 09:21

## 2023-10-04 RX ADMIN — LAMOTRIGINE 25 MG: 25 TABLET ORAL at 18:36

## 2023-10-04 RX ADMIN — BUDESONIDE 0.25 MG: 0.25 INHALANT ORAL at 07:01

## 2023-10-04 RX ADMIN — NYSTATIN: 100000 POWDER TOPICAL at 09:21

## 2023-10-04 RX ADMIN — AMLODIPINE BESYLATE 10 MG: 10 TABLET ORAL at 09:21

## 2023-10-04 RX ADMIN — Medication 250 MG: at 18:36

## 2023-10-04 RX ADMIN — SERTRALINE HYDROCHLORIDE 100 MG: 100 TABLET ORAL at 09:21

## 2023-10-04 RX ADMIN — IPRATROPIUM BROMIDE AND ALBUTEROL SULFATE 3 ML: 2.5; .5 SOLUTION RESPIRATORY (INHALATION) at 13:10

## 2023-10-04 RX ADMIN — PIPERACILLIN AND TAZOBACTAM 3.38 G: 36; 4.5 INJECTION, POWDER, FOR SOLUTION INTRAVENOUS at 05:12

## 2023-10-04 RX ADMIN — IPRATROPIUM BROMIDE AND ALBUTEROL SULFATE 3 ML: 2.5; .5 SOLUTION RESPIRATORY (INHALATION) at 07:02

## 2023-10-04 RX ADMIN — LAMOTRIGINE 25 MG: 25 TABLET ORAL at 09:21

## 2023-10-04 RX ADMIN — PIPERACILLIN AND TAZOBACTAM 3.38 G: 36; 4.5 INJECTION, POWDER, FOR SOLUTION INTRAVENOUS at 18:37

## 2023-10-04 RX ADMIN — NYSTATIN: 100000 POWDER TOPICAL at 18:36

## 2023-10-04 RX ADMIN — OXYBUTYNIN CHLORIDE 2.5 MG: 5 TABLET ORAL at 09:21

## 2023-10-04 RX ADMIN — Medication 250 MG: at 09:21

## 2023-10-04 RX ADMIN — PIPERACILLIN AND TAZOBACTAM 3.38 G: 36; 4.5 INJECTION, POWDER, FOR SOLUTION INTRAVENOUS at 11:58

## 2023-10-04 RX ADMIN — LABETALOL HYDROCHLORIDE 200 MG: 100 TABLET, FILM COATED ORAL at 05:40

## 2023-10-04 RX ADMIN — FAMOTIDINE 20 MG: 20 TABLET, FILM COATED ORAL at 09:21

## 2023-10-04 NOTE — PROGRESS NOTES
Daily Progress Note - [facility]  [facility] 80 y.o. male MRN: [id number]  Unit/Bed#: [address] Encounter: [id number]  Admitting Physician: Krystal Navarro MD   PCP: ROSITA Monae  Date of Admission:  9/29/2023  3:48 PM     Assessment and Plan     * Gross hematuria  Assessment & Plan     Acute on chronic.      Being followed per urology. Noted to have Pseudomonas in his urine cultures. Pt gross hematuria has improved. Noted no blood clots in catheter. CT abdomen: No hydronephrosis. 1.2 cm hyperattenuating focal lesion may be a hemorrhagic cyst versus a solid lesion, stable can be assessed with the MRI performed on nonemergent basis. Markedly limited evaluation of the urinary bladder if needed the urinary bladder can be assessed with the ultrasound. • Discontinued Cefepime 1 g, IV, Q12H yesterday  • Started Zosyn 3.375 mg, IV, Q6H yesterday  • Hold Eliquis due to hematuria    • Being followed per urology; Recommendations appreciated\  • Maintain site clean   • Cysto/tur bladder neck contracture/retrograde on Thursday and if found having bladder neck contracture, would need to have the contracture opened to make catheter insertion easier in the future   • Continue Vazquez with antibiotics prophylaxis  • Soft restraints, since patient keeps pulling out the catheter     Catheter-associated urinary tract infection   Assessment & Plan     Patient has a history of developing chronic UTIs. UA showed red,cloudy, turbid urine, nitrite negative, but the results showed interference - unable to analyze. No fever overnight. VS stable.      WBC 11.24 today - Trending down   Urine culture:   ? 1st: > 100,000 cfu Pseudomonas  ? 2nd: 10,000 - 19,000 cfu Pseudomonas     • Discontinued Cefepime 1 g, IV, Q12H yesterday  • Started Zosyn 3.375 mg, IV, Q6H yesterday - Continue   • Patient pulling out vazquez's catheter, on soft restraints  • Urology following, appreciate recommendations  • See plan under gross hematuria     Depression   Assessment & Plan     Chronic, stable. Patient hallucinating and calling out for Cora.      • Continue home dose of Zoloft 100 mg daily    Severe dementia ([address])  Assessment & Plan     Chronic, upon chart review, no documentation was found for absence seizures, EEG or neurology note. Patient sees a neurologist for his Parkinson's. Pt delirious this morning, calling out for Cora. • Continue Zoloft 100 mg daily  • Delirium Precautions   • Monitor mood and mental status   • Reorient patient      Chronic kidney disease  Assessment & Plan     Chronic, stable  Baseline Cr around 2.0. Cr 1.82 today. BUN 40.      • Avoid nephrotoxic drugs  • Avoid dehydration  • Avoid hypotension   • Trend creatinine levels  • Dose adjustment for medications as appropriate     Hypertension  Assessment & Plan     Chronic, stable     /63 mmHg.      • Continue Labetalol 200 mg every 8 hourly  • Monitor blood pressure     PD (Parkinson's disease)  Assessment & Plan     Chronic, stable - resting tremors present in all 4 limbs, more pronounced on his left leg. Patient has been on carbidopa levodopa for a long time. Pt delirious this morning, calling out for Cora. • Continue home parkinson medications  • Delirium precautions     Functional quadriplegia   Assessment & Plan             Chronic. 2ry to Parkinsons with severe dementia as evidenced by requiring 100% total assistance with ADLs and maintaining safety, incontinence, and feeding tube, treated with providing % total care, tube feeding, turn and repositioning, and bilateral wrist restraints.     · Continue above management     Dry eyes  Assessment & Plan     Patient has a chronic problem of having dry eyes     • Continue putting artificial tears, 1 drop in both eyes as needed     Gastroesophageal reflux disease  Assessment & Plan     Chronic, stable     • Continue home dose of famotidine 20 mg every other day     Chronic obstructive pulmonary disease (HCC)  Assessment & Plan     Chronic, stable. No wheezing on exam     • Continue home medications     Pressure injury of skin of right hip  Assessment & Plan     Patient has a chronic sacral wound, long with a right hip wound with skin peeling off indicating signs of pressure injury     • Reposition every 2 hours  • Consult for wound care placed  • Monitor closely     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HCC)  Assessment & Plan     Chronic, stable     • Gastrostomy tube in place  • Continue tube feeds per nutrition recommendations      Dysphagia  Assessment & Plan     Patient is unable to swallow his medication, failed speech evaluation at his prior hospital admission. G-tube was placed.       • Follow Speech and Swallow recommendations    VTE Pharmacologic Prophylaxis: VTE Score: 11 High Risk (Score >/= 5) - Pharmacological DVT Prophylaxis Contraindicated. Sequential Compression Devices Ordered. Patient Centered Rounds: I have performed bedside rounds with nursing staff today. Discussions with Specialists or Other Care Team Provider: Urology    Education and Discussions with Family / Patient: Daughter   Patient Information Sharing: With the consent of Hay Leiva , their loved ones (daughter) were notified today by inpatient team of the patient’s condition and current plan. All questions answered. Time Spent for Care: 35 minutes. More than 50% of total time spent on counseling and coordination of care as described above. Current Length of Stay: 5 day(s)    Current Patient Status: Inpatient   Certification Statement: The patient will continue to require additional inpatient hospital stay due to management of gross hematuria. Discharge Plan: Pending    Code Status: Level 3 - DNAR and DNI    Subjective:     Patient was evaluated at bedside. Alone in room and had soft restraints placed. Patient was fighting alone calling out for Cora.  Refers not remembering who she was. Refers not wanting to talk to her. Nonaggressive. Denies any pain or discomfort. Objective      Vitals:   Temp (24hrs), Av.2 °F (36.8 °C), Min:97.9 °F (36.6 °C), Max:98.4 °F (36.9 °C)    Temp:  [97.9 °F (36.6 °C)-98.4 °F (36.9 °C)] 98.4 °F (36.9 °C)  HR:  [74-79] 77  Resp:  [16-18] 18  BP: (119-140)/(58-78) 119/63  SpO2:  [96 %-98 %] 98 %  Body mass index is 26.5 kg/m². Input and Output Summary (last 24 hours): Intake/Output Summary (Last 24 hours) at 10/4/2023 1851  Last data filed at 10/4/2023 0700  Gross per 24 hour   Intake 2020 ml   Output 1800 ml   Net 220 ml     Physical Exam  Constitutional:       General: He is awake. He is not in acute distress. Appearance: He is overweight. He is ill-appearing. He is not toxic-appearing or diaphoretic. HENT:      Head: Normocephalic and atraumatic. Right Ear: External ear normal.      Left Ear: External ear normal.      Nose: Nose normal.      Mouth/Throat:      Mouth: Mucous membranes are dry. Comments: Dry lips  Eyes:      Conjunctiva/sclera: Conjunctivae normal.   Cardiovascular:      Rate and Rhythm: Normal rate and regular rhythm. Pulses: Normal pulses. Heart sounds: Normal heart sounds. Pulmonary:      Effort: Pulmonary effort is normal.      Breath sounds: Normal breath sounds. Abdominal:      General: Abdomen is flat. There is no distension. Palpations: Abdomen is soft. Genitourinary:     Comments: Urinary catheter in place, urine less bloody   Musculoskeletal:      Cervical back: Normal range of motion. Right lower leg: No edema. Left lower leg: No edema. Skin:     General: Skin is warm and dry. Comments: Scar - left breast   Neurological:      Mental Status: He is alert. Mental status is at baseline. He is disoriented. Psychiatric:         Attention and Perception: He perceives auditory and visual hallucinations.          Mood and Affect: Mood normal. Behavior: Behavior is cooperative. Thought Content: Thought content does not include homicidal or suicidal ideation. Additional Data:     Labs:    Results from last 7 days   Lab Units 10/04/23  0537   WBC Thousand/uL 11.24*   HEMOGLOBIN g/dL 9.4*   HEMATOCRIT % 29.6*   PLATELETS Thousands/uL 271   NEUTROS PCT % 69   LYMPHS PCT % 15   MONOS PCT % 8   EOS PCT % 6     Results from last 7 days   Lab Units 10/03/23  0528   POTASSIUM mmol/L 4.3   CHLORIDE mmol/L 111*   CO2 mmol/L 26   BUN mg/dL 42*   CREATININE mg/dL 2.11*   CALCIUM mg/dL 9.2   ALK PHOS U/L 44   ALT U/L 8   AST U/L 22     Results from last 7 days   Lab Units 09/29/23  1629   INR  1.13     Results from last 7 days   Lab Units 10/04/23  0651 10/04/23  0109 10/03/23  2101 10/03/23  1617 10/03/23  1118   POC GLUCOSE mg/dl 174* 169* 145* 161* 147*     Results from last 7 days   Lab Units 09/29/23  1629   HEMOGLOBIN A1C % 6.0*     * I Have Reviewed All Lab Data Listed Above. * Additional Pertinent Lab Tests Reviewed: All Labs For Current Hospital Admission Reviewed    Imaging:    Imaging Reports Reviewed Today Include: None  Imaging Personally Reviewed by Myself Includes:   All    Recent Cultures (last 7 days):     Results from last 7 days   Lab Units 09/29/23  2023 09/29/23  1630   URINE CULTURE  10,000-19,000 cfu/ml Pseudomonas aeruginosa*  <10,000 cfu/ml Providencia stuartii*  <10,000 cfu/ml Proteus species* >100,000 cfu/ml Pseudomonas aeruginosa*  >100,000 cfu/ml Providencia stuartii*  Proteus species*     Last 24 Hours Medication List:     Current Facility-Administered Medications   Medication Dose Route Frequency Provider Last Rate   • acetaminophen  650 mg Per G Tube Q6H PRN Kate Madison MD     • amLODIPine  10 mg Per G Tube Daily Josh Villarreal MD     • artificial tear   Both Eyes Q4H PRN Josh Villarreal MD     • ascorbic acid  250 mg Per G Tube Daily oJsh Villarreal MD     • budesonide  0.25 mg Nebulization BID Josh Villarreal MD • carbidopa-levodopa  1 tablet Per G Tube TID Darshan No MD     • famotidine  20 mg Per G Tube Every Other Day Darshan No MD     • folic acid  1 mg Per G Tube Daily Darshan No MD     • ipratropium-albuterol  3 mL Nebulization TID Miguel Chicas MD     • labetalol  200 mg Per G Tube Q8H [address] Darshan No MD     • lamoTRIgine  25 mg Per G Tube BID Darshan No MD     • latanoprost  1 drop Both Eyes HS Darshan No MD     • magnesium hydroxide  30 mL Per G Tube Daily PRN Darshan No MD     • melatonin  6 mg Per G Tube HS Darshan No MD     • nystatin   Topical TID Darshan No MD     • oxybutynin  2.5 mg Per G Tube BID Miguel Chicas MD     • piperacillin-tazobactam  3.375 g Intravenous Orville Dutton MD 3.375 g (10/04/23 9516)   • saccharomyces boulardii  250 mg Per PEG Tube BID Shari Olivo MD     • senna  8.8 mg Oral HS Bill Hobbs MD     • sertraline  100 mg Per G Tube Daily aDrshan No MD        ** Please Note: Dictation voice to text software may have been used in the creation of this document.  **    Shari Olivo MD  10/04/23  8:08 AM

## 2023-10-04 NOTE — PROGRESS NOTES
Progress Note - Urology      Patient: Tamiko Gilmore   : 1940 Sex: male   MRN: [id number]     CSN: [id number]  Unit/Bed#: [address] [address]     SUBJECTIVE:   Patient seen on evening rounds  Day #5 antibiotics  Urine clear  Make n.p.o. after midnight  To the OR tomorrow for cystoscopy possible transurethral resection bladder neck contracture retrograde pyelogram we will discuss with daughter POA      Objective   Vitals: /61   Pulse 78   Temp 97.7 °F (36.5 °C)   Resp 18   Ht 5' 11" (1.803 m)   Wt 86.2 kg (190 lb)   SpO2 96%   BMI 26.50 kg/m²     I/O last 24 hours:   In: 1920 [NG/GT:1920]  Out: 800 [Urine:800]      Physical Exam:   General Alert awake   Normocephalic atraumatic PERRLA  Lungs clear bilaterally  Cardiac normal S1 normal S2  Abdomen soft, flank pain none  Extremities no edema      Lab Results: CBC:   Lab Results   Component Value Date    WBC 11.24 (H) 10/04/2023    HGB 9.4 (L) 10/04/2023    HCT 29.6 (L) 10/04/2023    MCV 87 10/04/2023     10/04/2023    RBC 3.39 (L) 10/04/2023    MCH 27.7 10/04/2023    MCHC 31.8 10/04/2023    RDW 16.6 (H) 10/04/2023    MPV 9.8 10/04/2023    NRBC 0 10/04/2023     CMP:   Lab Results   Component Value Date     (H) 10/04/2023    CO2 26 10/04/2023    BUN 40 (H) 10/04/2023    CREATININE 1.82 (H) 10/04/2023    CALCIUM 9.2 10/04/2023    AST 21 10/04/2023    ALT 15 10/04/2023    ALKPHOS 48 10/04/2023    EGFR 33 10/04/2023     Urinalysis:   Lab Results   Component Value Date    COLORU Red 2023    CLARITYU Cloudy 2023    SPECGRAV 1.010 2023    PHUR 6.5 2023    LEUKOCYTESUR Interference- unable to analyze (A) 2023    NITRITE Interference- unable to analyze 2023    GLUCOSEU Negative 2023    KETONESU Interference- unable to analyze (A) 2023    BILIRUBINUR Interference- unable to analyze (A) 2023    BLOODU Interference- unable to analyze (A) 2023     Urine Culture:   Lab Results   Component Value Date URINECX 10,000-19,000 cfu/ml Pseudomonas aeruginosa (A) 09/29/2023    URINECX <10,000 cfu/ml Providencia stuartii (A) 09/29/2023    URINECX <10,000 cfu/ml Proteus species (A) 09/29/2023     PSA: No results found for: "PSA"      Assessment/ Plan:  Complicated UTI  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

## 2023-10-04 NOTE — PLAN OF CARE
Problem: Potential for Falls  Goal: Patient will remain free of falls  Description: INTERVENTIONS:  - Educate patient/family on patient safety including physical limitations  - Instruct patient to call for assistance with activity   - Consult OT/PT to assist with strengthening/mobility   - Keep Call bell within reach  - Keep bed low and locked with side rails adjusted as appropriate  - Keep care items and personal belongings within reach  - Initiate and maintain comfort rounds  - Make Fall Risk Sign visible to staff  - Offer Toileting every 2 Hours, in advance of need  - Initiate/Maintain bed alarm  - Obtain necessary fall risk management equipment: yellow socks  - Apply yellow socks and bracelet for high fall risk patients  - Consider moving patient to room near nurses station  Outcome: Progressing     Problem: RESPIRATORY - ADULT  Goal: Achieves optimal ventilation and oxygenation  Description: INTERVENTIONS:  - Assess for changes in respiratory status  - Assess for changes in mentation and behavior  - Position to facilitate oxygenation and minimize respiratory effort  - Oxygen administered by appropriate delivery if ordered  - Initiate smoking cessation education as indicated  - Encourage broncho-pulmonary hygiene including cough, deep breathe, Incentive Spirometry  - Assess the need for suctioning and aspirate as needed  - Assess and instruct to report SOB or any respiratory difficulty  - Respiratory Therapy support as indicated  Outcome: Progressing     Problem: GENITOURINARY - ADULT  Goal: Maintains or returns to baseline urinary function  Description: INTERVENTIONS:  - Assess urinary function  - Encourage oral fluids to ensure adequate hydration if ordered  - Administer IV fluids as ordered to ensure adequate hydration  - Administer ordered medications as needed  - Offer frequent toileting  - Follow urinary retention protocol if ordered  Outcome: Progressing  Goal: Absence of urinary retention  Description: INTERVENTIONS:  - Assess patient’s ability to void and empty bladder  - Monitor I/O  - Bladder scan as needed  - Discuss with physician/AP medications to alleviate retention as needed  - Discuss catheterization for long term situations as appropriate  Outcome: Progressing  Goal: Urinary catheter remains patent  Description: INTERVENTIONS:  - Assess patency of urinary catheter  - If patient has a chronic vazquez, consider changing catheter if non-functioning  - Follow guidelines for intermittent irrigation of non-functioning urinary catheter  Outcome: Progressing     Problem: SKIN/TISSUE INTEGRITY - ADULT  Goal: Skin Integrity remains intact(Skin Breakdown Prevention)  Description: Assess:  -Perform Henry assessment every shift  -Clean and moisturize skin every shift  -Inspect skin when repositioning, toileting, and assisting with ADLS  -Assess under medical devices such as IV sites every shift  -Assess extremities for adequate circulation and sensation     Bed Management:  -Have minimal linens on bed & keep smooth, unwrinkled  -Change linens as needed when moist or perspiring  -Avoid sitting or lying in one position for more than 2 hours while in bed  -Keep HOB at 30 degrees     Toileting:  -Offer bedside commode  -Assess for incontinence every shift  -Use incontinent care products after each incontinent episode such as Barrier creams    Activity:  -Mobilize patient 3 times a day  -Encourage activity and walks on unit  -Encourage or provide ROM exercises   -Turn and reposition patient every 2 Hours  -Use appropriate equipment to lift or move patient in bed  -Instruct/ Assist with weight shifting every shift when out of bed in chair  -Consider limitation of chair time 2 hour intervals    Skin Care:  -Avoid use of baby powder, tape, friction and shearing, hot water or constrictive clothing  -Relieve pressure over bony prominences using waffle cushion/wedges  -Do not massage red bony areas    Next Steps:  -Teach patient strategies to minimize risks such as repositioning   -Consider consults to  interdisciplinary teams such as PT/OT  Outcome: Progressing  Goal: Incision(s), wounds(s) or drain site(s) healing without S/S of infection  Description: INTERVENTIONS  - Assess and document dressing, incision, wound bed, drain sites and surrounding tissue  - Provide patient and family education  - Perform skin care/dressing changes every shift  Outcome: Progressing  Goal: Pressure injury heals and does not worsen  Description: Interventions:  - Implement low air loss mattress or specialty surface (Criteria met)  - Apply silicone foam dressing  - Instruct/assist with weight shifting every 90 minutes when in chair   - Limit chair time to 2 hour intervals  - Use special pressure reducing interventions such as waffle cushion when in chair   - Apply fecal or urinary incontinence containment device   - Perform passive or active ROM every shift  - Turn and reposition patient & offload bony prominences every 2 hours   - Utilize friction reducing device or surface for transfers   - Consider consults to  interdisciplinary teams such as PT/OT  - Use incontinent care products after each incontinent episode such as barrier creams  - Consider nutrition services referral as needed  Outcome: Progressing     Problem: SAFETY ADULT  Goal: Patient will remain free of falls  Description: INTERVENTIONS:  - Educate patient/family on patient safety including physical limitations  - Instruct patient to call for assistance with activity   - Consult OT/PT to assist with strengthening/mobility   - Keep Call bell within reach  - Keep bed low and locked with side rails adjusted as appropriate  - Keep care items and personal belongings within reach  - Initiate and maintain comfort rounds  - Make Fall Risk Sign visible to staff  - Offer Toileting every 2 Hours, in advance of need  - Initiate/Maintain bed alarm  - Obtain necessary fall risk management equipment: yellow socks  - Apply yellow socks and bracelet for high fall risk patients  - Consider moving patient to room near nurses station  Outcome: Progressing  Goal: Maintain or return to baseline ADL function  Description: INTERVENTIONS:  -  Assess patient's ability to carry out ADLs; assess patient's baseline for ADL function and identify physical deficits which impact ability to perform ADLs (bathing, care of mouth/teeth, toileting, grooming, dressing, etc.)  - Assess/evaluate cause of self-care deficits   - Assess range of motion  - Assess patient's mobility; develop plan if impaired  - Assess patient's need for assistive devices and provide as appropriate  - Encourage maximum independence but intervene and supervise when necessary  - Involve family in performance of ADLs  - Assess for home care needs following discharge   - Consider OT consult to assist with ADL evaluation and planning for discharge  - Provide patient education as appropriate  Outcome: Progressing  Goal: Maintains/Returns to pre admission functional level  Description: INTERVENTIONS:  - Perform BMAT or MOVE assessment daily.   - Set and communicate daily mobility goal to care team and patient/family/caregiver. - Collaborate with rehabilitation services on mobility goals if consulted  - Perform Range of Motion 3 times a day. - Reposition patient every 2 hours.   - Dangle patient 3 times a day  - Stand patient 3 times a day  - Ambulate patient 3 times a day  - Out of bed to chair 3 times a day   - Out of bed for meals 3 times a day  - Out of bed for toileting  - Record patient progress and toleration of activity level   Outcome: Progressing     Problem: SAFETY,RESTRAINT: NV/NON-SELF DESTRUCTIVE BEHAVIOR  Goal: Remains free of harm/injury (restraint for non violent/non self-detsructive behavior)  Description: INTERVENTIONS:  - Instruct patient/family regarding restraint use   - Assess and monitor physiologic and psychological status   - Provide interventions and comfort measures to meet assessed patient needs   - Identify and implement measures to help patient regain control  - Assess readiness for release of restraint   Outcome: Progressing  Goal: Returns to optimal restraint-free functioning  Description: INTERVENTIONS:  - Assess the patient's behavior and symptoms that indicate continued need for restraint  - Identify and implement measures to help patient regain control  - Assess readiness for release of restraint   Outcome: Progressing     Problem: Nutrition/Hydration-ADULT  Goal: Nutrient/Hydration intake appropriate for improving, restoring or maintaining nutritional needs  Description: Monitor and assess patient's nutrition/hydration status for malnutrition. Collaborate with interdisciplinary team and initiate plan and interventions as ordered. Monitor patient's weight and dietary intake as ordered or per policy. Utilize nutrition screening tool and intervene as necessary. Determine patient's food preferences and provide high-protein, high-caloric foods as appropriate.      INTERVENTIONS:  - Monitor oral intake, urinary output, labs, and treatment plans  - Assess nutrition and hydration status and recommend course of action  - Evaluate amount of meals eaten  - Assist patient with eating if necessary   - Allow adequate time for meals  - Recommend/ encourage appropriate diets, oral nutritional supplements, and vitamin/mineral supplements  - Order, calculate, and assess calorie counts as needed  - Recommend, monitor, and adjust tube feedings based on needs  - Assess need for intravenous fluids  - Provide specific nutrition/hydration education as appropriate  - Include patient/family/caregiver in decisions related to nutrition  Outcome: Progressing     Problem: MOBILITY - ADULT  Goal: Maintain or return to baseline ADL function  Description: INTERVENTIONS:  -  Assess patient's ability to carry out ADLs; assess patient's baseline for ADL function and identify physical deficits which impact ability to perform ADLs (bathing, care of mouth/teeth, toileting, grooming, dressing, etc.)  - Assess/evaluate cause of self-care deficits   - Assess range of motion  - Assess patient's mobility; develop plan if impaired  - Assess patient's need for assistive devices and provide as appropriate  - Encourage maximum independence but intervene and supervise when necessary  - Involve family in performance of ADLs  - Assess for home care needs following discharge   - Consider OT consult to assist with ADL evaluation and planning for discharge  - Provide patient education as appropriate  Outcome: Progressing  Goal: Maintains/Returns to pre admission functional level  Description: INTERVENTIONS:  - Perform BMAT or MOVE assessment daily.   - Set and communicate daily mobility goal to care team and patient/family/caregiver. - Collaborate with rehabilitation services on mobility goals if consulted  - Perform Range of Motion 3 times a day. - Reposition patient every 2 hours.   - Dangle patient 3 times a day  - Stand patient 3 times a day  - Ambulate patient 3 times a day  - Out of bed to chair 3 times a day   - Out of bed for meals 3 times a day  - Out of bed for toileting  - Record patient progress and toleration of activity level   Outcome: Progressing     Problem: Prexisting or High Potential for Compromised Skin Integrity  Goal: Skin integrity is maintained or improved  Description: INTERVENTIONS:  - Identify patients at risk for skin breakdown  - Assess and monitor skin integrity  - Assess and monitor nutrition and hydration status  - Monitor labs   - Assess for incontinence   - Turn and reposition patient  - Assist with mobility/ambulation  - Relieve pressure over bony prominences  - Avoid friction and shearing  - Provide appropriate hygiene as needed including keeping skin clean and dry  - Evaluate need for skin moisturizer/barrier cream  - Collaborate with interdisciplinary team   - Patient/family teaching  - Consider wound care consult   Outcome: Progressing

## 2023-10-04 NOTE — SPEECH THERAPY NOTE
F/U to swallowing evaluation continued. Received return call from SLP who saw pt during previous hospital stay in [facility] (8/22 and 8/27). He reportedly had PEG tube in place at that time (reportedly placed 2* FTT/poor po intake but was allowed pureed food for pleasure). During hospital stay in August (8/22 & 8/27), pt not transferring pureed food and pt made NPO at that time. Plan: Will f/u as inpt and upon return to SNF re: potential for safe "pleasure" feedings in this pt with dementia.    Ariela Odonnell [address] [facility] [phone]

## 2023-10-05 ENCOUNTER — APPOINTMENT (INPATIENT)
Dept: RADIOLOGY | Facility: HOSPITAL | Age: 83
DRG: 668 | End: 2023-10-05
Payer: MEDICARE

## 2023-10-05 ENCOUNTER — ANESTHESIA EVENT (INPATIENT)
Dept: PERIOP | Facility: HOSPITAL | Age: 83
DRG: 668 | End: 2023-10-05
Payer: MEDICARE

## 2023-10-05 ENCOUNTER — ANESTHESIA (INPATIENT)
Dept: PERIOP | Facility: HOSPITAL | Age: 83
DRG: 668 | End: 2023-10-05
Payer: MEDICARE

## 2023-10-05 LAB
ALBUMIN SERPL BCP-MCNC: 2.6 G/DL (ref 3.5–5)
ALP SERPL-CCNC: 36 U/L (ref 34–104)
ALT SERPL W P-5'-P-CCNC: 5 U/L (ref 7–52)
ANION GAP SERPL CALCULATED.3IONS-SCNC: 8 MMOL/L
AST SERPL W P-5'-P-CCNC: 20 U/L (ref 13–39)
BASOPHILS # BLD AUTO: 0.07 THOUSANDS/ÂΜL (ref 0–0.1)
BASOPHILS NFR BLD AUTO: 1 % (ref 0–1)
BILIRUB SERPL-MCNC: 0.34 MG/DL (ref 0.2–1)
BUN SERPL-MCNC: 38 MG/DL (ref 5–25)
CALCIUM ALBUM COR SERPL-MCNC: 9.8 MG/DL (ref 8.3–10.1)
CALCIUM SERPL-MCNC: 8.7 MG/DL (ref 8.4–10.2)
CHLORIDE SERPL-SCNC: 113 MMOL/L (ref 96–108)
CO2 SERPL-SCNC: 25 MMOL/L (ref 21–32)
CREAT SERPL-MCNC: 1.85 MG/DL (ref 0.6–1.3)
DME PARACHUTE DELIVERY DATE REQUESTED: NORMAL
DME PARACHUTE ORDER STATUS: NORMAL
DME PARACHUTE SUPPLIER NAME: NORMAL
DME PARACHUTE SUPPLIER PHONE: NORMAL
EOSINOPHIL # BLD AUTO: 0.72 THOUSAND/ÂΜL (ref 0–0.61)
EOSINOPHIL NFR BLD AUTO: 8 % (ref 0–6)
ERYTHROCYTE [DISTWIDTH] IN BLOOD BY AUTOMATED COUNT: 16.6 % (ref 11.6–15.1)
GFR SERPL CREATININE-BSD FRML MDRD: 32 ML/MIN/1.73SQ M
GLUCOSE SERPL-MCNC: 112 MG/DL (ref 65–140)
GLUCOSE SERPL-MCNC: 123 MG/DL (ref 65–140)
GLUCOSE SERPL-MCNC: 125 MG/DL (ref 65–140)
GLUCOSE SERPL-MCNC: 126 MG/DL (ref 65–140)
GLUCOSE SERPL-MCNC: 132 MG/DL (ref 65–140)
HCT VFR BLD AUTO: 28.1 % (ref 36.5–49.3)
HGB BLD-MCNC: 8.9 G/DL (ref 12–17)
IMM GRANULOCYTES # BLD AUTO: 0.04 THOUSAND/UL (ref 0–0.2)
IMM GRANULOCYTES NFR BLD AUTO: 0 % (ref 0–2)
LYMPHOCYTES # BLD AUTO: 1.5 THOUSANDS/ÂΜL (ref 0.6–4.47)
LYMPHOCYTES NFR BLD AUTO: 16 % (ref 14–44)
MAGNESIUM SERPL-MCNC: 1.8 MG/DL (ref 1.9–2.7)
MCH RBC QN AUTO: 27.8 PG (ref 26.8–34.3)
MCHC RBC AUTO-ENTMCNC: 31.7 G/DL (ref 31.4–37.4)
MCV RBC AUTO: 88 FL (ref 82–98)
MONOCYTES # BLD AUTO: 0.55 THOUSAND/ÂΜL (ref 0.17–1.22)
MONOCYTES NFR BLD AUTO: 6 % (ref 4–12)
NEUTROPHILS # BLD AUTO: 6.31 THOUSANDS/ÂΜL (ref 1.85–7.62)
NEUTS SEG NFR BLD AUTO: 69 % (ref 43–75)
NRBC BLD AUTO-RTO: 0 /100 WBCS
PLATELET # BLD AUTO: 239 THOUSANDS/UL (ref 149–390)
PMV BLD AUTO: 8.5 FL (ref 8.9–12.7)
POTASSIUM SERPL-SCNC: 4.4 MMOL/L (ref 3.5–5.3)
PROT SERPL-MCNC: 6.8 G/DL (ref 6.4–8.4)
RBC # BLD AUTO: 3.2 MILLION/UL (ref 3.88–5.62)
SODIUM SERPL-SCNC: 146 MMOL/L (ref 135–147)
WBC # BLD AUTO: 9.19 THOUSAND/UL (ref 4.31–10.16)

## 2023-10-05 PROCEDURE — 0T5B8ZZ DESTRUCTION OF BLADDER, VIA NATURAL OR ARTIFICIAL OPENING ENDOSCOPIC: ICD-10-PCS | Performed by: SPECIALIST

## 2023-10-05 PROCEDURE — 83735 ASSAY OF MAGNESIUM: CPT | Performed by: STUDENT IN AN ORGANIZED HEALTH CARE EDUCATION/TRAINING PROGRAM

## 2023-10-05 PROCEDURE — 0TJB8ZZ INSPECTION OF BLADDER, VIA NATURAL OR ARTIFICIAL OPENING ENDOSCOPIC: ICD-10-PCS | Performed by: SPECIALIST

## 2023-10-05 PROCEDURE — 88342 IMHCHEM/IMCYTCHM 1ST ANTB: CPT | Performed by: PATHOLOGY

## 2023-10-05 PROCEDURE — 94640 AIRWAY INHALATION TREATMENT: CPT

## 2023-10-05 PROCEDURE — 97530 THERAPEUTIC ACTIVITIES: CPT

## 2023-10-05 PROCEDURE — 82948 REAGENT STRIP/BLOOD GLUCOSE: CPT

## 2023-10-05 PROCEDURE — 80053 COMPREHEN METABOLIC PANEL: CPT

## 2023-10-05 PROCEDURE — 94760 N-INVAS EAR/PLS OXIMETRY 1: CPT

## 2023-10-05 PROCEDURE — 88305 TISSUE EXAM BY PATHOLOGIST: CPT | Performed by: PATHOLOGY

## 2023-10-05 PROCEDURE — 88307 TISSUE EXAM BY PATHOLOGIST: CPT | Performed by: PATHOLOGY

## 2023-10-05 PROCEDURE — C1758 CATHETER, URETERAL: HCPCS | Performed by: SPECIALIST

## 2023-10-05 PROCEDURE — BT14ZZZ FLUOROSCOPY OF KIDNEYS, URETERS AND BLADDER: ICD-10-PCS | Performed by: SPECIALIST

## 2023-10-05 PROCEDURE — 74018 RADEX ABDOMEN 1 VIEW: CPT

## 2023-10-05 PROCEDURE — 88341 IMHCHEM/IMCYTCHM EA ADD ANTB: CPT | Performed by: PATHOLOGY

## 2023-10-05 PROCEDURE — 99232 SBSQ HOSP IP/OBS MODERATE 35: CPT | Performed by: INTERNAL MEDICINE

## 2023-10-05 PROCEDURE — 97535 SELF CARE MNGMENT TRAINING: CPT

## 2023-10-05 PROCEDURE — 85025 COMPLETE CBC W/AUTO DIFF WBC: CPT | Performed by: STUDENT IN AN ORGANIZED HEALTH CARE EDUCATION/TRAINING PROGRAM

## 2023-10-05 RX ORDER — ONDANSETRON 2 MG/ML
INJECTION INTRAMUSCULAR; INTRAVENOUS AS NEEDED
Status: DISCONTINUED | OUTPATIENT
Start: 2023-10-05 | End: 2023-10-05

## 2023-10-05 RX ORDER — FENTANYL CITRATE/PF 50 MCG/ML
25 SYRINGE (ML) INJECTION
Status: DISCONTINUED | OUTPATIENT
Start: 2023-10-05 | End: 2023-10-05 | Stop reason: HOSPADM

## 2023-10-05 RX ORDER — LIDOCAINE HYDROCHLORIDE 10 MG/ML
INJECTION, SOLUTION EPIDURAL; INFILTRATION; INTRACAUDAL; PERINEURAL AS NEEDED
Status: DISCONTINUED | OUTPATIENT
Start: 2023-10-05 | End: 2023-10-05

## 2023-10-05 RX ORDER — ONDANSETRON 2 MG/ML
4 INJECTION INTRAMUSCULAR; INTRAVENOUS EVERY 6 HOURS PRN
Status: DISCONTINUED | OUTPATIENT
Start: 2023-10-05 | End: 2023-10-09 | Stop reason: HOSPADM

## 2023-10-05 RX ORDER — LEVOFLOXACIN 5 MG/ML
500 INJECTION, SOLUTION INTRAVENOUS EVERY 24 HOURS
Status: COMPLETED | OUTPATIENT
Start: 2023-10-05 | End: 2023-10-06

## 2023-10-05 RX ORDER — MAGNESIUM HYDROXIDE/ALUMINUM HYDROXICE/SIMETHICONE 120; 1200; 1200 MG/30ML; MG/30ML; MG/30ML
30 SUSPENSION ORAL EVERY 6 HOURS PRN
Status: DISCONTINUED | OUTPATIENT
Start: 2023-10-05 | End: 2023-10-09 | Stop reason: HOSPADM

## 2023-10-05 RX ORDER — GLYCINE 1.5 G/100ML
SOLUTION IRRIGATION AS NEEDED
Status: DISCONTINUED | OUTPATIENT
Start: 2023-10-05 | End: 2023-10-05 | Stop reason: HOSPADM

## 2023-10-05 RX ORDER — FENTANYL CITRATE 50 UG/ML
INJECTION, SOLUTION INTRAMUSCULAR; INTRAVENOUS AS NEEDED
Status: DISCONTINUED | OUTPATIENT
Start: 2023-10-05 | End: 2023-10-05

## 2023-10-05 RX ORDER — ONDANSETRON 2 MG/ML
4 INJECTION INTRAMUSCULAR; INTRAVENOUS ONCE AS NEEDED
Status: DISCONTINUED | OUTPATIENT
Start: 2023-10-05 | End: 2023-10-05 | Stop reason: HOSPADM

## 2023-10-05 RX ORDER — PROPOFOL 10 MG/ML
INJECTION, EMULSION INTRAVENOUS AS NEEDED
Status: DISCONTINUED | OUTPATIENT
Start: 2023-10-05 | End: 2023-10-05

## 2023-10-05 RX ORDER — OXYCODONE HYDROCHLORIDE AND ACETAMINOPHEN 5; 325 MG/1; MG/1
1 TABLET ORAL ONCE AS NEEDED
Status: DISCONTINUED | OUTPATIENT
Start: 2023-10-05 | End: 2023-10-09

## 2023-10-05 RX ADMIN — SENNOSIDES 8.8 MG: 8.8 SYRUP ORAL at 21:15

## 2023-10-05 RX ADMIN — CARBIDOPA AND LEVODOPA 1 TABLET: 25; 100 TABLET ORAL at 00:51

## 2023-10-05 RX ADMIN — CARBIDOPA AND LEVODOPA 1 TABLET: 25; 100 TABLET ORAL at 20:29

## 2023-10-05 RX ADMIN — LAMOTRIGINE 25 MG: 25 TABLET ORAL at 09:37

## 2023-10-05 RX ADMIN — LABETALOL HYDROCHLORIDE 200 MG: 100 TABLET, FILM COATED ORAL at 00:51

## 2023-10-05 RX ADMIN — FENTANYL CITRATE 50 MCG: 50 INJECTION, SOLUTION INTRAMUSCULAR; INTRAVENOUS at 17:14

## 2023-10-05 RX ADMIN — PIPERACILLIN AND TAZOBACTAM 3.38 G: 36; 4.5 INJECTION, POWDER, FOR SOLUTION INTRAVENOUS at 06:49

## 2023-10-05 RX ADMIN — Medication 6 MG: at 22:20

## 2023-10-05 RX ADMIN — PIPERACILLIN AND TAZOBACTAM 3.38 G: 36; 4.5 INJECTION, POWDER, FOR SOLUTION INTRAVENOUS at 22:39

## 2023-10-05 RX ADMIN — LABETALOL HYDROCHLORIDE 200 MG: 100 TABLET, FILM COATED ORAL at 07:04

## 2023-10-05 RX ADMIN — PIPERACILLIN AND TAZOBACTAM 3.38 G: 36; 4.5 INJECTION, POWDER, FOR SOLUTION INTRAVENOUS at 00:53

## 2023-10-05 RX ADMIN — LIDOCAINE HYDROCHLORIDE 50 MG: 10 INJECTION, SOLUTION EPIDURAL; INFILTRATION; INTRACAUDAL; PERINEURAL at 17:14

## 2023-10-05 RX ADMIN — ONDANSETRON 4 MG: 2 INJECTION INTRAMUSCULAR; INTRAVENOUS at 17:15

## 2023-10-05 RX ADMIN — PIPERACILLIN AND TAZOBACTAM 3.38 G: 36; 4.5 INJECTION, POWDER, FOR SOLUTION INTRAVENOUS at 12:03

## 2023-10-05 RX ADMIN — SENNOSIDES 8.8 MG: 8.8 SYRUP ORAL at 00:53

## 2023-10-05 RX ADMIN — FENTANYL CITRATE 25 MCG: 50 INJECTION, SOLUTION INTRAMUSCULAR; INTRAVENOUS at 17:28

## 2023-10-05 RX ADMIN — NYSTATIN: 100000 POWDER TOPICAL at 00:54

## 2023-10-05 RX ADMIN — IPRATROPIUM BROMIDE AND ALBUTEROL SULFATE 3 ML: 2.5; .5 SOLUTION RESPIRATORY (INHALATION) at 07:03

## 2023-10-05 RX ADMIN — LATANOPROST 1 DROP: 50 SOLUTION OPHTHALMIC at 21:15

## 2023-10-05 RX ADMIN — Medication 6 MG: at 00:51

## 2023-10-05 RX ADMIN — PROPOFOL 100 MG: 10 INJECTION, EMULSION INTRAVENOUS at 17:14

## 2023-10-05 RX ADMIN — FENTANYL CITRATE 25 MCG: 50 INJECTION, SOLUTION INTRAMUSCULAR; INTRAVENOUS at 17:29

## 2023-10-05 RX ADMIN — PROPOFOL 50 MG: 10 INJECTION, EMULSION INTRAVENOUS at 17:15

## 2023-10-05 RX ADMIN — AMLODIPINE BESYLATE 10 MG: 10 TABLET ORAL at 09:37

## 2023-10-05 RX ADMIN — OXYBUTYNIN CHLORIDE 2.5 MG: 5 TABLET ORAL at 09:37

## 2023-10-05 RX ADMIN — CARBIDOPA AND LEVODOPA 1 TABLET: 25; 100 TABLET ORAL at 09:37

## 2023-10-05 RX ADMIN — LEVOFLOXACIN 500 MG: 500 INJECTION, SOLUTION INTRAVENOUS at 20:59

## 2023-10-05 RX ADMIN — NYSTATIN: 100000 POWDER TOPICAL at 21:15

## 2023-10-05 RX ADMIN — BUDESONIDE 0.25 MG: 0.25 INHALANT ORAL at 07:03

## 2023-10-05 RX ADMIN — NYSTATIN: 100000 POWDER TOPICAL at 09:38

## 2023-10-05 RX ADMIN — LATANOPROST 1 DROP: 50 SOLUTION OPHTHALMIC at 00:54

## 2023-10-05 RX ADMIN — IPRATROPIUM BROMIDE AND ALBUTEROL SULFATE 3 ML: 2.5; .5 SOLUTION RESPIRATORY (INHALATION) at 13:17

## 2023-10-05 NOTE — PLAN OF CARE
Problem: Potential for Falls  Goal: Patient will remain free of falls  Description: INTERVENTIONS:  - Educate patient/family on patient safety including physical limitations  - Instruct patient to call for assistance with activity   - Consult OT/PT to assist with strengthening/mobility   - Keep Call bell within reach  - Keep bed low and locked with side rails adjusted as appropriate  - Keep care items and personal belongings within reach  - Initiate and maintain comfort rounds  - Make Fall Risk Sign visible to staff  - Offer Toileting every 2 Hours, in advance of need  - Initiate/Maintain bed alarm  - Obtain necessary fall risk management equipment: yellow socks  - Apply yellow socks and bracelet for high fall risk patients  - Consider moving patient to room near nurses station  Outcome: Progressing     Problem: RESPIRATORY - ADULT  Goal: Achieves optimal ventilation and oxygenation  Description: INTERVENTIONS:  - Assess for changes in respiratory status  - Assess for changes in mentation and behavior  - Position to facilitate oxygenation and minimize respiratory effort  - Oxygen administered by appropriate delivery if ordered  - Initiate smoking cessation education as indicated  - Encourage broncho-pulmonary hygiene including cough, deep breathe, Incentive Spirometry  - Assess the need for suctioning and aspirate as needed  - Assess and instruct to report SOB or any respiratory difficulty  - Respiratory Therapy support as indicated  Outcome: Progressing     Problem: GENITOURINARY - ADULT  Goal: Maintains or returns to baseline urinary function  Description: INTERVENTIONS:  - Assess urinary function  - Encourage oral fluids to ensure adequate hydration if ordered  - Administer IV fluids as ordered to ensure adequate hydration  - Administer ordered medications as needed  - Offer frequent toileting  - Follow urinary retention protocol if ordered  Outcome: Progressing  Goal: Absence of urinary retention  Description: INTERVENTIONS:  - Assess patient’s ability to void and empty bladder  - Monitor I/O  - Bladder scan as needed  - Discuss with physician/AP medications to alleviate retention as needed  - Discuss catheterization for long term situations as appropriate  Outcome: Progressing  Goal: Urinary catheter remains patent  Description: INTERVENTIONS:  - Assess patency of urinary catheter  - If patient has a chronic vazquez, consider changing catheter if non-functioning  - Follow guidelines for intermittent irrigation of non-functioning urinary catheter  Outcome: Progressing     Problem: SKIN/TISSUE INTEGRITY - ADULT  Goal: Skin Integrity remains intact(Skin Breakdown Prevention)  Description: Assess:  -Perform Henry assessment every shift  -Clean and moisturize skin every shift  -Inspect skin when repositioning, toileting, and assisting with ADLS  -Assess under medical devices such as IV sites every shift  -Assess extremities for adequate circulation and sensation     Bed Management:  -Have minimal linens on bed & keep smooth, unwrinkled  -Change linens as needed when moist or perspiring  -Avoid sitting or lying in one position for more than 2 hours while in bed  -Keep HOB at 30 degrees     Toileting:  -Offer bedside commode  -Assess for incontinence every shift  -Use incontinent care products after each incontinent episode such as barrier creams    Activity:  -Mobilize patient 3 times a day  -Encourage activity and walks on unit  -Encourage or provide ROM exercises   -Turn and reposition patient every 2 Hours  -Use appropriate equipment to lift or move patient in bed  -Instruct/ Assist with weight shifting every shiftwhen out of bed in chair  -Consider limitation of chair time 2 hour intervals    Skin Care:  -Avoid use of baby powder, tape, friction and shearing, hot water or constrictive clothing  -Relieve pressure over bony prominences using waffle cushion  -Do not massage red bony areas    Next Steps:  -Teach patient strategies to minimize risks such as repositioning   -Consider consults to  interdisciplinary teams such as PT/OT  Outcome: Progressing  Goal: Incision(s), wounds(s) or drain site(s) healing without S/S of infection  Description: INTERVENTIONS  - Assess and document dressing, incision, wound bed, drain sites and surrounding tissue  - Provide patient and family education  - Perform skin care/dressing changes every shift  Outcome: Progressing  Goal: Pressure injury heals and does not worsen  Description: Interventions:  - Implement low air loss mattress or specialty surface (Criteria met)  - Apply silicone foam dressing  - Instruct/assist with weight shifting every 90 minutes when in chair   - Limit chair time to 2 hour intervals  - Use special pressure reducing interventions such as waffle cushion when in chair   - Apply fecal or urinary incontinence containment device   - Perform passive or active ROM every shift  - Turn and reposition patient & offload bony prominences every 2 hours   - Utilize friction reducing device or surface for transfers   - Consider consults to  interdisciplinary teams such as PT/OT  - Use incontinent care products after each incontinent episode such as barrier creams  - Consider nutrition services referral as needed  Outcome: Progressing     Problem: SAFETY ADULT  Goal: Patient will remain free of falls  Description: INTERVENTIONS:  - Educate patient/family on patient safety including physical limitations  - Instruct patient to call for assistance with activity   - Consult OT/PT to assist with strengthening/mobility   - Keep Call bell within reach  - Keep bed low and locked with side rails adjusted as appropriate  - Keep care items and personal belongings within reach  - Initiate and maintain comfort rounds  - Make Fall Risk Sign visible to staff  - Offer Toileting every 2 Hours, in advance of need  - Initiate/Maintain bed alarm  - Obtain necessary fall risk management equipment: yellow socks  - Apply yellow socks and bracelet for high fall risk patients  - Consider moving patient to room near nurses station  Outcome: Progressing  Goal: Maintain or return to baseline ADL function  Description: INTERVENTIONS:  -  Assess patient's ability to carry out ADLs; assess patient's baseline for ADL function and identify physical deficits which impact ability to perform ADLs (bathing, care of mouth/teeth, toileting, grooming, dressing, etc.)  - Assess/evaluate cause of self-care deficits   - Assess range of motion  - Assess patient's mobility; develop plan if impaired  - Assess patient's need for assistive devices and provide as appropriate  - Encourage maximum independence but intervene and supervise when necessary  - Involve family in performance of ADLs  - Assess for home care needs following discharge   - Consider OT consult to assist with ADL evaluation and planning for discharge  - Provide patient education as appropriate  Outcome: Progressing  Goal: Maintains/Returns to pre admission functional level  Description: INTERVENTIONS:  - Perform BMAT or MOVE assessment daily.   - Set and communicate daily mobility goal to care team and patient/family/caregiver. - Collaborate with rehabilitation services on mobility goals if consulted  - Perform Range of Motion 3 times a day. - Reposition patient every 2 hours.   - Dangle patient 3 times a day  - Out of bed for toileting  - Record patient progress and toleration of activity level   Outcome: Progressing     Problem: SAFETY,RESTRAINT: NV/NON-SELF DESTRUCTIVE BEHAVIOR  Goal: Remains free of harm/injury (restraint for non violent/non self-detsructive behavior)  Description: INTERVENTIONS:  - Instruct patient/family regarding restraint use   - Assess and monitor physiologic and psychological status   - Provide interventions and comfort measures to meet assessed patient needs   - Identify and implement measures to help patient regain control  - Assess readiness for release of restraint   Outcome: Progressing  Goal: Returns to optimal restraint-free functioning  Description: INTERVENTIONS:  - Assess the patient's behavior and symptoms that indicate continued need for restraint  - Identify and implement measures to help patient regain control  - Assess readiness for release of restraint   Outcome: Progressing     Problem: Nutrition/Hydration-ADULT  Goal: Nutrient/Hydration intake appropriate for improving, restoring or maintaining nutritional needs  Description: Monitor and assess patient's nutrition/hydration status for malnutrition. Collaborate with interdisciplinary team and initiate plan and interventions as ordered. Monitor patient's weight and dietary intake as ordered or per policy. Utilize nutrition screening tool and intervene as necessary. Determine patient's food preferences and provide high-protein, high-caloric foods as appropriate.      INTERVENTIONS:  - Monitor oral intake, urinary output, labs, and treatment plans  - Assess nutrition and hydration status and recommend course of action  - Evaluate amount of meals eaten  - Assist patient with eating if necessary   - Allow adequate time for meals  - Recommend/ encourage appropriate diets, oral nutritional supplements, and vitamin/mineral supplements  - Order, calculate, and assess calorie counts as needed  - Recommend, monitor, and adjust tube feedings based on needs  - Assess need for intravenous fluids  - Provide specific nutrition/hydration education as appropriate  - Include patient/family/caregiver in decisions related to nutrition  Outcome: Progressing     Problem: MOBILITY - ADULT  Goal: Maintain or return to baseline ADL function  Description: INTERVENTIONS:  -  Assess patient's ability to carry out ADLs; assess patient's baseline for ADL function and identify physical deficits which impact ability to perform ADLs (bathing, care of mouth/teeth, toileting, grooming, dressing, etc.)  - Assess/evaluate cause of self-care deficits   - Assess range of motion  - Assess patient's mobility; develop plan if impaired  - Assess patient's need for assistive devices and provide as appropriate  - Encourage maximum independence but intervene and supervise when necessary  - Involve family in performance of ADLs  - Assess for home care needs following discharge   - Consider OT consult to assist with ADL evaluation and planning for discharge  - Provide patient education as appropriate  Outcome: Progressing  Goal: Maintains/Returns to pre admission functional level  Description: INTERVENTIONS:  - Perform BMAT or MOVE assessment daily.   - Set and communicate daily mobility goal to care team and patient/family/caregiver. - Collaborate with rehabilitation services on mobility goals if consulted  - Perform Range of Motion 3 times a day. - Reposition patient every 2 hours.   - Dangle patient 3 times a day  - Out of bed for toileting  - Record patient progress and toleration of activity level   Outcome: Progressing     Problem: Prexisting or High Potential for Compromised Skin Integrity  Goal: Skin integrity is maintained or improved  Description: INTERVENTIONS:  - Identify patients at risk for skin breakdown  - Assess and monitor skin integrity  - Assess and monitor nutrition and hydration status  - Monitor labs   - Assess for incontinence   - Turn and reposition patient  - Assist with mobility/ambulation  - Relieve pressure over bony prominences  - Avoid friction and shearing  - Provide appropriate hygiene as needed including keeping skin clean and dry  - Evaluate need for skin moisturizer/barrier cream  - Collaborate with interdisciplinary team   - Patient/family teaching  - Consider wound care consult   Outcome: Progressing

## 2023-10-05 NOTE — PROGRESS NOTES
Daily Progress Note - [facility] Residency  Dennis Zuñiga 80 y.o. male MRN: [id number]  Unit/Bed#: [address] Encounter: [id number]  Admitting Physician: Kate Madison MD   PCP: ROSITA Monae  Date of Admission:  9/29/2023  3:48 PM     Assessment and Plan     * Gross hematuria  Assessment & Plan     Acute on chronic.      Being followed per urology. Noted to have Pseudomonas in his urine cultures. Pt gross hematuria has improved. Noted no blood clots in catheter. CT abdomen: No hydronephrosis. 1.2 cm hyperattenuating focal lesion may be a hemorrhagic cyst versus a solid lesion, stable can be assessed with the MRI performed on nonemergent basis. Markedly limited evaluation of the urinary bladder if needed the urinary bladder can be assessed with the ultrasound. Pt going for urologic procedure today - cystoscopy possible transurethral resection bladder neck contracture retrograde pyelogram.     • Continue Zosyn 3.375 mg, IV, Q6H yesterday - Day 6 of abx (combined) - previously on Cefepime  • Hold Eliquis due to hematuria    • Being followed per urology; Recommendations appreciated  • NPO since midnight for procedure today   • Maintain site clean   • Continue Vazquez with antibiotics prophylaxis  • Soft restraints, since patient keeps pulling out the catheter     Catheter-associated urinary tract infection   Assessment & Plan     Patient has a history of developing chronic UTIs. UA showed red,cloudy, turbid urine, nitrite negative, but the results showed interference - unable to analyze. No fever overnight. VS stable.      WBC Trending down   Urine culture:   ? 1st: > 100,000 cfu Pseudomonas  ? 2nd: 10,000 - 19,000 cfu Pseudomonas     • Patient pulling out vazquez's catheter, on soft restraints  • Zosyn 3.375 mg, IV, Q6H yesterday - Continue  • Urology following, appreciate recommendations  • See plan under gross hematuria     Depression   Assessment & Plan     Chronic, stable. Patient hallucinating and calling out for Cora.      • Continue home dose of Zoloft 100 mg daily    Severe dementia ([address])  Assessment & Plan     Chronic, upon chart review, no documentation was found for absence seizures, EEG or neurology note. Patient sees a neurologist for his Parkinson's. Pt waxes and wanes in mentation. Sometimes delirious and sometimes stable. Currently stable. • Continue Zoloft 100 mg daily  • Delirium Precautions   • Monitor mood and mental status   • Reorient patient      Chronic kidney disease  Assessment & Plan     Chronic, stable. Cr fluctuating lower then baseline.      • Avoid nephrotoxic drugs  • Avoid dehydration  • Avoid hypotension   • Trend creatinine levels  • Dose adjustment for medications as appropriate     Hypertension  Assessment & Plan     Chronic, stable     /63 mmHg.      • Continue Labetalol 200 mg every 8 hourly  • Monitor blood pressure     PD (Parkinson's disease)  Assessment & Plan     Chronic, stable - resting tremors present in all 4 limbs, more pronounced on his left leg. Patient has been on carbidopa levodopa for a long time. Currently stable. • Continue home parkinson medications  • Delirium precautions     Functional quadriplegia   Assessment & Plan             Chronic. 2ry to Parkinsons with severe dementia as evidenced by requiring 100% total assistance with ADLs and maintaining safety, incontinence, and feeding tube, treated with providing % total care, tube feeding, turn and repositioning, and bilateral wrist restraints.     · Continue above management     Dry eyes  Assessment & Plan     Patient has a chronic problem of having dry eyes.      • Continue putting artificial tears, 1 drop in both eyes as needed     Gastroesophageal reflux disease  Assessment & Plan     Chronic, stable.     • Continue home dose of famotidine 20 mg every other day     Chronic obstructive pulmonary disease ([address])  Assessment & Plan     Chronic, stable. No wheezing on exam.      • Continue home medications     Pressure injury of skin of right hip  Assessment & Plan     Patient has a chronic sacral wound, long with a right hip wound with skin peeling off indicating signs of pressure injury.      • Reposition every 2 hours  • Consult for wound care placed  • Monitor closely     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HCC)  Assessment & Plan     Chronic, stable     • Currently NPO for procedure   • Gastrostomy tube in place  • Continue tube feeds per nutrition recommendations      Dysphagia  Assessment & Plan     Patient is unable to swallow his medication, failed speech evaluation at his prior hospital admission. G-tube was placed.       • Follow Speech and Swallow recommendations    VTE Pharmacologic Prophylaxis: VTE Score: 11 High Risk (Score >/= 5) - Pharmacological DVT Prophylaxis Contraindicated. Sequential Compression Devices Ordered. Patient Centered Rounds: I have performed bedside rounds with nursing staff today. Discussions with Specialists or Other Care Team Provider: Urology    Education and Discussions with Family / Patient: Daughter   Patient Information Sharing: With the consent of Ximena Chacon , their loved ones (daughter) were notified today by inpatient team of the patient’s condition and current plan. All questions answered. Time Spent for Care: 35 minutes. More than 50% of total time spent on counseling and coordination of care as described above. Current Length of Stay: 6 day(s)    Current Patient Status: Inpatient   Certification Statement: The patient will continue to require additional inpatient hospital stay due to management of gross hematuria. Discharge Plan: Pending    Code Status: Level 3 - DNAR and DNI    Subjective:     Patient evaluated at bedside. Patient was awake, alert and oriented to person. Patient in no acute distress. Refers some pain but no specific site. Baseline dementia.  No hallucinations. Calm mood. Objective      Vitals:   Temp (24hrs), Av.8 °F (36.6 °C), Min:97.4 °F (36.3 °C), Max:98.4 °F (36.9 °C)    Temp:  [97.4 °F (36.3 °C)-98.4 °F (36.9 °C)] 97.4 °F (36.3 °C)  HR:  [69-79] 69  Resp:  [18] 18  BP: (119-125)/(61-63) 125/63  SpO2:  [96 %-98 %] 96 %  Body mass index is 26.5 kg/m². Input and Output Summary (last 24 hours): Intake/Output Summary (Last 24 hours) at 10/5/2023 0647  Last data filed at 10/4/2023 1901  Gross per 24 hour   Intake 1170 ml   Output 1900 ml   Net -730 ml     Physical Exam  Vitals and nursing note reviewed. Constitutional:       General: He is awake. He is not in acute distress. Appearance: He is overweight. He is not ill-appearing or toxic-appearing. Interventions: He is restrained. HENT:      Head: Normocephalic and atraumatic. Right Ear: External ear normal.      Left Ear: External ear normal.      Nose: Nose normal.      Mouth/Throat:      Mouth: Mucous membranes are dry. Comments: Dry oral mucosa and lips  Eyes:      General: No scleral icterus. Conjunctiva/sclera: Conjunctivae normal.   Cardiovascular:      Rate and Rhythm: Normal rate and regular rhythm. Pulses: Normal pulses. Heart sounds: Normal heart sounds. Pulmonary:      Effort: Pulmonary effort is normal.      Breath sounds: Normal breath sounds. Abdominal:      General: Abdomen is flat. Bowel sounds are normal. There is no distension. Palpations: Abdomen is soft. Tenderness: There is no abdominal tenderness. Genitourinary:     Penis: Normal.       Comments: Lieberman catheter in place. No bleeding in site of insertion. Clear yellow urine noticed in bag. No hematuria. Musculoskeletal:         General: No swelling. Cervical back: Normal range of motion. Right lower leg: No edema. Left lower leg: No edema. Skin:     General: Skin is warm and dry.       Comments: Horizontal linear scar on left breast    Neurological: Mental Status: He is alert. Mental status is at baseline. He is disoriented. Comments: Oriented only to person. Psychiatric:         Attention and Perception: He does not perceive visual hallucinations. Mood and Affect: Mood normal. Mood is not anxious or depressed. Affect is not angry or tearful. Speech: Speech normal.         Behavior: Behavior is slowed. Behavior is not agitated or aggressive. Behavior is cooperative. Thought Content: Thought content is not paranoid or delusional.         Cognition and Memory: He does not exhibit impaired remote memory. Judgment: Judgment is not impulsive. Comments: Soft restraints on both hands. Additional Data:     Labs:    Results from last 7 days   Lab Units 10/04/23  0537   WBC Thousand/uL 11.24*   HEMOGLOBIN g/dL 9.4*   HEMATOCRIT % 29.6*   PLATELETS Thousands/uL 271   NEUTROS PCT % 69   LYMPHS PCT % 15   MONOS PCT % 8   EOS PCT % 6     Results from last 7 days   Lab Units 10/04/23  0811   POTASSIUM mmol/L 4.3   CHLORIDE mmol/L 112*   CO2 mmol/L 26   BUN mg/dL 40*   CREATININE mg/dL 1.82*   CALCIUM mg/dL 9.2   ALK PHOS U/L 48   ALT U/L 15   AST U/L 21     Results from last 7 days   Lab Units 09/29/23  1629   INR  1.13     Results from last 7 days   Lab Units 10/04/23  2340 10/04/23  1603 10/04/23  1109 10/04/23  0651 10/04/23  0109   POC GLUCOSE mg/dl 133 162* 169* 174* 169*     Results from last 7 days   Lab Units 09/29/23  1629   HEMOGLOBIN A1C % 6.0*     * I Have Reviewed All Lab Data Listed Above. * Additional Pertinent Lab Tests Reviewed: All Labs For Current Hospital Admission Reviewed    Imaging:    Imaging Reports Reviewed Today Include: None  Imaging Personally Reviewed by Myself Includes:   All    Recent Cultures (last 7 days):     Results from last 7 days   Lab Units 09/29/23  2023 09/29/23  1630   URINE CULTURE  10,000-19,000 cfu/ml Pseudomonas aeruginosa*  <10,000 cfu/ml Providencia stuartii*  <10,000 cfu/ml Proteus species* >100,000 cfu/ml Pseudomonas aeruginosa*  >100,000 cfu/ml Providencia stuartii*  Proteus species*     Last 24 Hours Medication List:     Current Facility-Administered Medications   Medication Dose Route Frequency Provider Last Rate   • acetaminophen  650 mg Per G Tube Q6H PRN Sena Waldron MD     • amLODIPine  10 mg Per G Tube Daily Rafa Morgan MD     • artificial tear   Both Eyes Q4H PRN Rafa Morgan MD     • ascorbic acid  250 mg Per G Tube Daily Rafa Morgan MD     • budesonide  0.25 mg Nebulization BID Rafa Morgan MD     • carbidopa-levodopa  1 tablet Per G Tube TID Rafa Morgan MD     • famotidine  20 mg Per G Tube Every Other Day Rafa Morgan MD     • folic acid  1 mg Per G Tube Daily Rafa Morgan MD     • ipratropium-albuterol  3 mL Nebulization TID Sena Waldron MD     • labetalol  200 mg Per G Tube Q8H [facility] Rafa Morgan MD     • lamoTRIgine  25 mg Per G Tube BID Rafa Morgan MD     • latanoprost  1 drop Both Eyes HS Rafa Morgan MD     • magnesium hydroxide  30 mL Per G Tube Daily PRN Rafa Morgan MD     • melatonin  6 mg Per G Tube HS Rafa Morgan MD     • nystatin   Topical TID Rafa Morgan MD     • oxybutynin  2.5 mg Per G Tube BID Sena Waldron MD     • piperacillin-tazobactam  3.375 g Intravenous Arlyn Corado MD 3.375 g (10/05/23 0053)   • saccharomyces boulardii  250 mg Per PEG Tube BID Elmira Polk MD     • senna  8.8 mg Oral HS Riaz Suggs MD     • sertraline  100 mg Per G Tube Daily Rafa Morgan MD        ** Please Note: Dictation voice to text software may have been used in the creation of this document.  **    Elmira Polk MD  10/05/23  6:47 AM

## 2023-10-05 NOTE — ANESTHESIA PREPROCEDURE EVALUATION
Procedure:  CYSTOSCOPY EVACUATION OF CLOTS bilateral retrograde pyelograms possible transurethral resection bladder neck contracture (Bilateral: Bladder)    Relevant Problems   CARDIO   (+) Hypertension      ENDO   (+) Type 2 diabetes mellitus without complication, without long-term current use of insulin (HCC)      GI/HEPATIC   (+) Dysphagia   (+) Gastroesophageal reflux disease      /RENAL   (+) Benign prostatic hyperplasia   (+) Chronic kidney disease      NEURO/PSYCH   (+) Depression   (+) Severe dementia (HCC)      PULMONARY   (+) Chronic obstructive pulmonary disease (HCC)        Physical Exam    Airway    Mallampati score: II  TM Distance: >3 FB  Neck ROM: full     Dental   No notable dental hx     Cardiovascular  Cardiovascular exam normal    Pulmonary  Pulmonary exam normal     Other Findings        Anesthesia Plan  ASA Score- 3     Anesthesia Type- general with ASA Monitors. Additional Monitors:   Airway Plan: LMA. Plan Factors-Exercise tolerance (METS): >4 METS. Chart reviewed. Existing labs reviewed. Patient summary reviewed. Patient is not a current smoker. Induction- intravenous. Postoperative Plan- Plan for postoperative opioid use. Informed Consent- Anesthetic plan and risks discussed with patient. I personally reviewed this patient with the CRNA. Discussed and agreed on the Anesthesia Plan with the CRNA. Sterling Magallon

## 2023-10-05 NOTE — PHYSICAL THERAPY NOTE
PT TREATMENT     10/05/23 0948   PT Last Visit   PT Visit Date 10/05/23   Note Type   Note Type Treatment   Pain Assessment   Pain Assessment Tool 0-10   Pain Score No Pain   Restrictions/Precautions   Other Precautions Fall Risk;Bed Alarm; Chair Alarm;Cognitive   General   Chart Reviewed Yes   Cognition   Overall Cognitive Status Impaired   Arousal/Participation Cooperative   Attention Difficulty dividing attention   Orientation Level Oriented to person   Following Commands Follows one step commands inconsistently   Subjective   Subjective "I feel good"  "I'm at the park"   Bed Mobility   Rolling R 2  Maximal assistance   Rolling L 2  Maximal assistance   Supine to Sit 2  Maximal assistance   Additional items Assist x 2   Sit to Supine 2  Maximal assistance   Additional items Assist x 2   Transfers   Sit to Stand 2  Maximal assistance   Additional items Assist x 2  (to partially stand)   Stand to Sit 2  Maximal assistance   Additional items Assist x 2   Balance   Static Sitting Poor +   Static Standing Zero   Activity Tolerance   Activity Tolerance Patient tolerated treatment well;Patient limited by fatigue   Exercises   Neuro re-ed x 10 each LAQ, hip flexion sitting at the edge of the bed   Balance training  trunk flexion with hand held assist x 10 to promote upright sitting, sit to stand x 2 trials with max assist x 2 1x with walker and 1 time with hand held assist x 2   Assessment   Prognosis Fair   Problem List Decreased strength;Decreased range of motion; Impaired balance;Decreased mobility; Decreased cognition   Assessment Pt demonstrates improved alertness and participation in PT today however pt with significant cognitive impairment and profound weakness. Pt was not able to stand with max assist x 2. Pt would need a trixie lift to get up OOB. The patient's AM-PAC Basic Mobility Inpatient Short Form Raw Score is 7.  A Raw score of less than or equal to 16 suggests the patient may benefit from discharge to post-acute rehabilitation services. Plan   Treatment/Interventions Functional transfer training;LE strengthening/ROM; Therapeutic exercise; Bed mobility; Equipment eval/education;Patient/family training;Gait training   Progress Slow progress, cognitive deficits   PT Frequency 3-5x/wk   Recommendation   PT Discharge Recommendation Post acute rehabilitation services   AM-PAC Basic Mobility Inpatient   Turning in Flat Bed Without Bedrails 2   Lying on Back to Sitting on Edge of Flat Bed Without Bedrails 1   Moving Bed to Chair 1   Standing Up From Chair Using Arms 1   Walk in Room 1   Climb 3-5 Stairs With Railing 1   Basic Mobility Inpatient Raw Score 7   Highest Level Of Mobility   JH-HLM Goal 2: Bed activities/Dependent transfer   JH-HLM Achieved 3: Sit at edge of bed   End of Consult   Patient Position at End of Consult All needs within reach; Supine   Licensure   [address] [place] License Number  Thomas Brittany [id number]

## 2023-10-05 NOTE — OCCUPATIONAL THERAPY NOTE
OT TREATMENT         10/05/23 0958   OT Last Visit   OT Visit Date 10/05/23   Note Type   Note Type Treatment   Pain Assessment   Pain Assessment Tool 0-10   Pain Score No Pain   Restrictions/Precautions   Other Precautions Cognitive; Chair Alarm; Bed Alarm; Fall Risk   ADL   Grooming Assistance 4  Minimal Assistance   Grooming Deficit Wash/dry face   Grooming Comments supine with setup   Toileting Assistance  1  Total Assistance   Bed Mobility   Rolling R 2  Maximal assistance   Rolling L 2  Maximal assistance   Supine to Sit 2  Maximal assistance   Additional items Assist x 2;Verbal cues   Sit to Supine 2  Maximal assistance   Additional items Assist x 2;Verbal cues   Transfers   Sit to Stand 2  Maximal assistance   Additional items Assist x 2  (unable to get fully upright)   Stand to Sit 2  Maximal assistance   Additional items Assist x 2   ROM- Right Upper Extremities   R Shoulder AROM; Flexion; Horizontal ABduction   R Elbow AROM;Elbow flexion;Elbow extension   R Hand AROM; Thumb; Index finger; Long finger;Ring finger;Little finger   R Weight/Reps/Sets 10 times each in bed with head raised   ROM - Left Upper Extremities    L Shoulder AROM; Flexion; Horizontal ABduction   L Elbow AROM;Elbow flexion;Elbow extension   L Hand AROM; Thumb; Index finger; Long finger;Ring finger;Little finger   L Weight/Reps/Sets 10 times each in bed with head raised   Cognition   Overall Cognitive Status Impaired   Arousal/Participation Cooperative   Attention Difficulty dividing attention   Orientation Level Oriented to person;Disoriented to place; Disoriented to time;Disoriented to situation  ("park")   Following Commands Follows one step commands inconsistently   Assessment   Assessment Patient seen for OT treatment. Patient is alert today and cooperative and pleasant. Patient able to complete grooming and UB exercise.   Patient is generally weak and deconditioned will benefit from continued OT services to maximize functional performance with ADLS. The patient's raw score on the AM-PAC Daily Activity Inpatient Short Form is 7. A raw score of less than 19 suggests the patient may benefit from discharge to post-acute rehabilitation services. Please refer to the recommendation of the Occupational Therapist for safe discharge planning. Plan   Treatment Interventions ADL retraining;Functional transfer training;UE strengthening/ROM; Endurance training;Patient/family training;Cognitive reorientation; Activityengagement   OT Frequency 2-3x/wk   Recommendation   OT Discharge Recommendation Post acute rehabilitation services   AM-PAC Daily Activity Inpatient   Lower Body Dressing 1   Bathing 1   Toileting 1   Upper Body Dressing 1   Grooming 3   Eating 3   Daily Activity Raw Score 10   Turning Head Towards Sound 4   Follow Simple Instructions 3   Low Function Daily Activity Raw Score 17   Low Function Daily Activity Standardized Score  28.95   AM-PAC Applied Cognition Inpatient   Following a Speech/Presentation 2   Understanding Ordinary Conversation 4   Taking Medications 1   Remembering Where Things Are Placed or Put Away 2   Remembering List of 4-5 Errands 1   Taking Care of Complicated Tasks 1   Applied Cognition Raw Score 11   Applied Cognition Standardized Score 93.18   Licensure   [place] License Number  [place] [id number] [facility] OTR/L [id number]

## 2023-10-05 NOTE — OR NURSING
Patient came from nursing unit on two-point restraints, patient was able to state name and birthday. Calm, not in any distress. Skin intact under restraints on inspection. Appears comfortable.

## 2023-10-05 NOTE — SPEECH THERAPY NOTE
Pt NPO today for procedure. Resume PEG TF s/p procedure (continue NPO).    Katty Stinson [id number] [facility] [phone]

## 2023-10-05 NOTE — PLAN OF CARE
Problem: OCCUPATIONAL THERAPY ADULT  Goal: Performs self-care activities at highest level of function for planned discharge setting. See evaluation for individualized goals. Description: Treatment Interventions: ADL retraining, Functional transfer training, UE strengthening/ROM, Endurance training, Cognitive reorientation, Patient/family training, Equipment evaluation/education, Compensatory technique education, Continued evaluation, Energy conservation, Activityengagement          See flowsheet documentation for full assessment, interventions and recommendations. Outcome: Progressing  Note: Limitation: Decreased ADL status, Decreased UE ROM, Decreased UE strength, Decreased Safe judgement during ADL, Decreased cognition, Decreased endurance, Decreased self-care trans, Decreased high-level ADLs  Prognosis: Fair  Assessment: Patient seen for OT treatment. Patient is alert today and cooperative and pleasant. Patient able to complete grooming and UB exercise. Patient is generally weak and deconditioned will benefit from continued OT services to maximize functional performance with ADLS.      OT Discharge Recommendation: Post acute rehabilitation services

## 2023-10-05 NOTE — OP NOTE
OPERATIVE REPORT  PATIENT NAME: Alex Chan    :  1940  MRN: [id number]  Pt Location: [facility] OR ROOM 04    SURGERY DATE: 10/5/2023    Surgeon(s) and Role:     * Yolanda Monroy MD - Primary    Preop Diagnosis:  Hematuria [R31.9]  Bladder neck contracture    Post-Op Diagnosis Codes:     * Hematuria [R31.9]   Bladder neck contracture  Bladder mass    Procedure(s):  Bilateral - CYSTOSCOPY. TURBT. RIGHT BLADDER WALL. 4 CM TUMOR transurethral resection bladder neck contracture    Specimen(s):  ID Type Source Tests Collected by Time Destination   1 : biopsy right bladder wall Tissue Urinary Bladder TISSUE EXAM Yolanda Monroy MD 10/5/2023 1739    2 : bladder neck chips Tissue Urinary Bladder TISSUE EXAM Yolanda Monroy MD 10/5/2023 174        Estimated Blood Loss:   Minimal    Drains:  Gastrostomy/Enterostomy  Umbilicus (Active)   Surrounding Skin Intact 10/04/23 2032   Drain Status Tube feed infusing 10/04/23 2032   Drainage Appearance None 10/04/23 2032   Site Description Scabbed 10/04/23 0811   Dressing Status Dry; Intact 10/04/23 2032   Dressing Type Split gauze 10/04/23 2032   Intake (mL) 1020 mL 10/04/23 0700   Output (mL) 0 mL 10/03/23 0700   Number of days: 5       Urethral Catheter Latex; Three way 22 Fr.  (Active)   Number of days: 0       Anesthesia Type:   General/LMA    Operative Indications:  Hematuria [R329]  69-year-old male seen in the ER last Friday night recently discharged from [facility] after 37-day stay with indwelling Lieberman catheter for chronic urinary tract infections patient daughter present now noting dementia history of prostate cancer undergoing radical retropubic prostatectomy over 20 years ago told that he has difficult caths with Lieberman changes found in the ER to have Lieberman catheter pulled into urethra by patient due to ongoing dementia living with the daughter just 2 days changed by myself started on continuous bladder irrigation with 22 [place] three-way Lieberman catheter and antibiotics what appeared to be complicated UTI now receiving a full 7 days of antibiotics and after discussion with daughter to return to the OR for evaluation of difficult cath chronic UTIs more than likely related to obstructing bladder neck contracture residual urines    Operative Findings: Moderate bladder neck contracture opened at 3 and 9:00 large solid bladder tumors extending over the right lateral wall with immediate bleeding upon insertion of the cystoscope. Wall and left wall right lateral wall tumor resected fulgurated path pending possible recurrent prostate cancer bladder cancer 22 three-way Lieberman catheter inserted left to continuous bladder irrigation CBI clear    Complications:   None    Procedure and Technique:  Patient identified in the holding area telephone consent from daughter placed napsylate after anesthesia induced placed in thigh position draped and prepped standard fashion timeout performed. A 22 [place] Islander scope passed urethra the bladder. Anterior this is malice. Post urethra confirmed moderate bladder neck contracture with erythema and trauma noted consistent with traumatic pulling of Lieberman into the bladder neck scope passed through the bladder neck contracture with multiple large sessile tumors located in the right lateral wall roof left wall insertion of the scope over these tumors confirmed moderate bladder neck contracture ureteral's cannot be found the right lateral wall tumor was aggressively resected due to bleeding and biopsies taken a total of 4 cm was fulgurated the scope was inserted back into the bladder neck and resection of the bladder neck contracture between 3 and 9:00 pole specimen sent separately the scope removed 22 [place] Island[place] three-way Lieberman catheter inserted with minimal difficulty 10 cc in the balloon left the contents variation for normal saline time dictation CBI is clear this is a breath   I was present for the entire procedure.     Patient Disposition:  PACU SIGNATURE: Deborah Donovan MD  DATE: October 5, 2023  TIME: 6:10 PM

## 2023-10-06 ENCOUNTER — APPOINTMENT (INPATIENT)
Dept: RADIOLOGY | Facility: HOSPITAL | Age: 83
DRG: 668 | End: 2023-10-06
Payer: MEDICARE

## 2023-10-06 PROBLEM — C67.2: Status: ACTIVE | Noted: 2023-10-06

## 2023-10-06 PROBLEM — I82.513 CHRONIC DEEP VEIN THROMBOSIS (DVT) OF FEMORAL VEIN OF BOTH LOWER EXTREMITIES (HCC): Status: ACTIVE | Noted: 2023-10-06

## 2023-10-06 LAB
ALBUMIN SERPL BCP-MCNC: 2.6 G/DL (ref 3.5–5)
ALP SERPL-CCNC: 40 U/L (ref 34–104)
ALT SERPL W P-5'-P-CCNC: 3 U/L (ref 7–52)
ANION GAP SERPL CALCULATED.3IONS-SCNC: 6 MMOL/L
AST SERPL W P-5'-P-CCNC: 17 U/L (ref 13–39)
BASOPHILS # BLD AUTO: 0.07 THOUSANDS/ÂΜL (ref 0–0.1)
BASOPHILS NFR BLD AUTO: 1 % (ref 0–1)
BILIRUB SERPL-MCNC: 0.25 MG/DL (ref 0.2–1)
BUN SERPL-MCNC: 39 MG/DL (ref 5–25)
CALCIUM ALBUM COR SERPL-MCNC: 9.5 MG/DL (ref 8.3–10.1)
CALCIUM SERPL-MCNC: 8.4 MG/DL (ref 8.4–10.2)
CHLORIDE SERPL-SCNC: 115 MMOL/L (ref 96–108)
CO2 SERPL-SCNC: 26 MMOL/L (ref 21–32)
CREAT SERPL-MCNC: 2.23 MG/DL (ref 0.6–1.3)
DME PARACHUTE DELIVERY DATE REQUESTED: NORMAL
DME PARACHUTE DELIVERY NOTE: NORMAL
DME PARACHUTE ITEM DESCRIPTION: NORMAL
DME PARACHUTE ORDER STATUS: NORMAL
DME PARACHUTE SUPPLIER NAME: NORMAL
DME PARACHUTE SUPPLIER PHONE: NORMAL
EOSINOPHIL # BLD AUTO: 0.75 THOUSAND/ÂΜL (ref 0–0.61)
EOSINOPHIL NFR BLD AUTO: 9 % (ref 0–6)
ERYTHROCYTE [DISTWIDTH] IN BLOOD BY AUTOMATED COUNT: 16.7 % (ref 11.6–15.1)
GFR SERPL CREATININE-BSD FRML MDRD: 26 ML/MIN/1.73SQ M
GLUCOSE SERPL-MCNC: 118 MG/DL (ref 65–140)
GLUCOSE SERPL-MCNC: 118 MG/DL (ref 65–140)
GLUCOSE SERPL-MCNC: 139 MG/DL (ref 65–140)
GLUCOSE SERPL-MCNC: 149 MG/DL (ref 65–140)
GLUCOSE SERPL-MCNC: 154 MG/DL (ref 65–140)
HCT VFR BLD AUTO: 29.3 % (ref 36.5–49.3)
HGB BLD-MCNC: 9 G/DL (ref 12–17)
IMM GRANULOCYTES # BLD AUTO: 0.02 THOUSAND/UL (ref 0–0.2)
IMM GRANULOCYTES NFR BLD AUTO: 0 % (ref 0–2)
INR PPP: 1.28 (ref 0.84–1.19)
LYMPHOCYTES # BLD AUTO: 1.08 THOUSANDS/ÂΜL (ref 0.6–4.47)
LYMPHOCYTES NFR BLD AUTO: 13 % (ref 14–44)
MAGNESIUM SERPL-MCNC: 1.9 MG/DL (ref 1.9–2.7)
MCH RBC QN AUTO: 27.6 PG (ref 26.8–34.3)
MCHC RBC AUTO-ENTMCNC: 30.7 G/DL (ref 31.4–37.4)
MCV RBC AUTO: 90 FL (ref 82–98)
MONOCYTES # BLD AUTO: 0.61 THOUSAND/ÂΜL (ref 0.17–1.22)
MONOCYTES NFR BLD AUTO: 7 % (ref 4–12)
NEUTROPHILS # BLD AUTO: 6.01 THOUSANDS/ÂΜL (ref 1.85–7.62)
NEUTS SEG NFR BLD AUTO: 70 % (ref 43–75)
NRBC BLD AUTO-RTO: 0 /100 WBCS
PLATELET # BLD AUTO: 272 THOUSANDS/UL (ref 149–390)
PMV BLD AUTO: 9.3 FL (ref 8.9–12.7)
POTASSIUM SERPL-SCNC: 4.6 MMOL/L (ref 3.5–5.3)
PROT SERPL-MCNC: 6.7 G/DL (ref 6.4–8.4)
PROTHROMBIN TIME: 16.2 SECONDS (ref 11.6–14.5)
PSA SERPL-MCNC: 5.61 NG/ML (ref 0–4)
RBC # BLD AUTO: 3.26 MILLION/UL (ref 3.88–5.62)
SODIUM SERPL-SCNC: 147 MMOL/L (ref 135–147)
WBC # BLD AUTO: 8.54 THOUSAND/UL (ref 4.31–10.16)

## 2023-10-06 PROCEDURE — 80053 COMPREHEN METABOLIC PANEL: CPT | Performed by: SPECIALIST

## 2023-10-06 PROCEDURE — 82948 REAGENT STRIP/BLOOD GLUCOSE: CPT

## 2023-10-06 PROCEDURE — 83735 ASSAY OF MAGNESIUM: CPT

## 2023-10-06 PROCEDURE — 85025 COMPLETE CBC W/AUTO DIFF WBC: CPT

## 2023-10-06 PROCEDURE — 85610 PROTHROMBIN TIME: CPT

## 2023-10-06 PROCEDURE — 94640 AIRWAY INHALATION TREATMENT: CPT

## 2023-10-06 PROCEDURE — 94760 N-INVAS EAR/PLS OXIMETRY 1: CPT

## 2023-10-06 PROCEDURE — 97129 THER IVNTJ 1ST 15 MIN: CPT

## 2023-10-06 PROCEDURE — 99232 SBSQ HOSP IP/OBS MODERATE 35: CPT | Performed by: INTERNAL MEDICINE

## 2023-10-06 PROCEDURE — 84153 ASSAY OF PSA TOTAL: CPT | Performed by: INTERNAL MEDICINE

## 2023-10-06 PROCEDURE — 93970 EXTREMITY STUDY: CPT

## 2023-10-06 RX ORDER — HEPARIN SODIUM 10000 [USP'U]/100ML
18 INJECTION, SOLUTION INTRAVENOUS
Status: DISCONTINUED | OUTPATIENT
Start: 2023-10-06 | End: 2023-10-07

## 2023-10-06 RX ORDER — LANOLIN ALCOHOL/MO/W.PET/CERES
800 CREAM (GRAM) TOPICAL ONCE
Status: COMPLETED | OUTPATIENT
Start: 2023-10-06 | End: 2023-10-06

## 2023-10-06 RX ORDER — SODIUM CHLORIDE, SODIUM LACTATE, POTASSIUM CHLORIDE, CALCIUM CHLORIDE 600; 310; 30; 20 MG/100ML; MG/100ML; MG/100ML; MG/100ML
125 INJECTION, SOLUTION INTRAVENOUS CONTINUOUS
Status: DISCONTINUED | OUTPATIENT
Start: 2023-10-06 | End: 2023-10-06

## 2023-10-06 RX ORDER — LABETALOL 100 MG/1
200 TABLET, FILM COATED ORAL EVERY 8 HOURS SCHEDULED
Status: DISCONTINUED | OUTPATIENT
Start: 2023-10-06 | End: 2023-10-09 | Stop reason: HOSPADM

## 2023-10-06 RX ORDER — SODIUM CHLORIDE, SODIUM GLUCONATE, SODIUM ACETATE, POTASSIUM CHLORIDE, MAGNESIUM CHLORIDE, SODIUM PHOSPHATE, DIBASIC, AND POTASSIUM PHOSPHATE .53; .5; .37; .037; .03; .012; .00082 G/100ML; G/100ML; G/100ML; G/100ML; G/100ML; G/100ML; G/100ML
50 INJECTION, SOLUTION INTRAVENOUS CONTINUOUS
Status: DISPENSED | OUTPATIENT
Start: 2023-10-06 | End: 2023-10-06

## 2023-10-06 RX ORDER — AMLODIPINE BESYLATE 10 MG/1
10 TABLET ORAL DAILY
Status: DISCONTINUED | OUTPATIENT
Start: 2023-10-07 | End: 2023-10-09 | Stop reason: HOSPADM

## 2023-10-06 RX ADMIN — CARBIDOPA AND LEVODOPA 1 TABLET: 25; 100 TABLET ORAL at 15:53

## 2023-10-06 RX ADMIN — HEPARIN SODIUM 18 UNITS/KG/HR: 10000 INJECTION, SOLUTION INTRAVENOUS at 17:47

## 2023-10-06 RX ADMIN — IPRATROPIUM BROMIDE AND ALBUTEROL SULFATE 3 ML: 2.5; .5 SOLUTION RESPIRATORY (INHALATION) at 07:18

## 2023-10-06 RX ADMIN — BUDESONIDE 0.25 MG: 0.25 INHALANT ORAL at 07:18

## 2023-10-06 RX ADMIN — LABETALOL HYDROCHLORIDE 200 MG: 100 TABLET, FILM COATED ORAL at 06:08

## 2023-10-06 RX ADMIN — IPRATROPIUM BROMIDE AND ALBUTEROL SULFATE 3 ML: 2.5; .5 SOLUTION RESPIRATORY (INHALATION) at 13:21

## 2023-10-06 RX ADMIN — FAMOTIDINE 20 MG: 20 TABLET, FILM COATED ORAL at 08:46

## 2023-10-06 RX ADMIN — Medication 250 MG: at 17:16

## 2023-10-06 RX ADMIN — SERTRALINE HYDROCHLORIDE 100 MG: 100 TABLET ORAL at 08:45

## 2023-10-06 RX ADMIN — OXYBUTYNIN CHLORIDE 2.5 MG: 5 TABLET ORAL at 08:46

## 2023-10-06 RX ADMIN — NYSTATIN: 100000 POWDER TOPICAL at 08:51

## 2023-10-06 RX ADMIN — CARBIDOPA AND LEVODOPA 1 TABLET: 25; 100 TABLET ORAL at 21:27

## 2023-10-06 RX ADMIN — Medication 250 MG: at 08:45

## 2023-10-06 RX ADMIN — PIPERACILLIN AND TAZOBACTAM 3.38 G: 36; 4.5 INJECTION, POWDER, FOR SOLUTION INTRAVENOUS at 06:00

## 2023-10-06 RX ADMIN — Medication 800 MG: at 11:45

## 2023-10-06 RX ADMIN — NYSTATIN 1 APPLICATION: 100000 POWDER TOPICAL at 21:29

## 2023-10-06 RX ADMIN — CARBIDOPA AND LEVODOPA 1 TABLET: 25; 100 TABLET ORAL at 08:46

## 2023-10-06 RX ADMIN — LAMOTRIGINE 25 MG: 25 TABLET ORAL at 08:45

## 2023-10-06 RX ADMIN — LAMOTRIGINE 25 MG: 25 TABLET ORAL at 17:16

## 2023-10-06 RX ADMIN — Medication 6 MG: at 21:28

## 2023-10-06 RX ADMIN — SODIUM CHLORIDE, SODIUM LACTATE, POTASSIUM CHLORIDE, AND CALCIUM CHLORIDE 125 ML/HR: .6; .31; .03; .02 INJECTION, SOLUTION INTRAVENOUS at 09:00

## 2023-10-06 RX ADMIN — NYSTATIN: 100000 POWDER TOPICAL at 15:53

## 2023-10-06 RX ADMIN — SODIUM CHLORIDE, SODIUM GLUCONATE, SODIUM ACETATE, POTASSIUM CHLORIDE, MAGNESIUM CHLORIDE, SODIUM PHOSPHATE, DIBASIC, AND POTASSIUM PHOSPHATE 50 ML/HR: .53; .5; .37; .037; .03; .012; .00082 INJECTION, SOLUTION INTRAVENOUS at 11:25

## 2023-10-06 RX ADMIN — LATANOPROST 1 DROP: 50 SOLUTION OPHTHALMIC at 21:29

## 2023-10-06 RX ADMIN — OXYBUTYNIN CHLORIDE 2.5 MG: 5 TABLET ORAL at 17:16

## 2023-10-06 RX ADMIN — PIPERACILLIN AND TAZOBACTAM 2.25 G: 2; .25 INJECTION, POWDER, LYOPHILIZED, FOR SOLUTION INTRAVENOUS at 10:50

## 2023-10-06 RX ADMIN — AMLODIPINE BESYLATE 10 MG: 10 TABLET ORAL at 08:45

## 2023-10-06 RX ADMIN — PIPERACILLIN AND TAZOBACTAM 2.25 G: 2; .25 INJECTION, POWDER, LYOPHILIZED, FOR SOLUTION INTRAVENOUS at 17:05

## 2023-10-06 RX ADMIN — BUDESONIDE 0.25 MG: 0.25 INHALANT ORAL at 21:09

## 2023-10-06 RX ADMIN — SENNOSIDES 8.8 MG: 8.8 SYRUP ORAL at 21:45

## 2023-10-06 RX ADMIN — IPRATROPIUM BROMIDE AND ALBUTEROL SULFATE 3 ML: 2.5; .5 SOLUTION RESPIRATORY (INHALATION) at 21:09

## 2023-10-06 RX ADMIN — FOLIC ACID 1 MG: 1 TABLET ORAL at 08:46

## 2023-10-06 NOTE — OCCUPATIONAL THERAPY NOTE
OT TREATMENT         10/06/23 8490   OT Last Visit   OT Visit Date 10/06/23   Note Type   Note Type Treatment   Pain Assessment   Pain Assessment Tool 0-10   Pain Score No Pain   Restrictions/Precautions   Other Precautions Cognitive; Chair Alarm; Bed Alarm; Restraints   Assessment   Assessment Patient seen for OT cognitive assessment per physician order. UMS cognitive assessment completed with minimal distractions in patients room. Patient scored a 0/30 indicating dementia. The examination tests orientation, memory, attention, problem solving, language, delayed recall and visuospatial and executive functions. Patient with decreased attention, problem solving, processing, orientation, memory, decreased recall, visuospatial and executive functioning. Patient was unable to hold the pen for drawing and placing an x in the triangle. Patient with difficulty with command follow and is easily distracted. Patient is pleasant and cooperative. The patient's raw score on the AM-PAC Daily Activity Inpatient Short Form is 10. A raw score of less than 19 suggests the patient may benefit from discharge to post-acute rehabilitation services. Please refer to the recommendation of the Occupational Therapist for safe discharge planning. Plan   Treatment Interventions Cognitive reorientation; Activityengagement   OT Frequency 2-3x/wk   Recommendation   OT Discharge Recommendation Post acute rehabilitation services   [facility] Daily Activity Inpatient   Lower Body Dressing 1   Bathing 1   Toileting 1   Upper Body Dressing 1   Grooming 3   Eating 3   Daily Activity Raw Score 10   Turning Head Towards Sound 4   Follow Simple Instructions 2   Low Function Daily Activity Raw Score 16   Low Function Daily Activity Standardized Score  27.65   AM-PAC Applied Cognition Inpatient   Following a Speech/Presentation 2   Understanding Ordinary Conversation 4   Taking Medications 1   Remembering Where Things Are Placed or Put Away 2   Remembering List of 4-5 Errands 1   Taking Care of Complicated Tasks 1   Applied Cognition Raw Score 11   Applied Cognition Standardized Score 27.03   SLUMS   What day of the week is it? 0   What is the year? 0   What state are we in? 0   How much did you spend? 0   How much do you have left? 0   Name animals? 0   Oject recall? 0   Repeat numbers backwards? 0   Draw a clock? 0   Identify a triangle 0   Determine size? 0   What was the females name? 0   When did she go back to work? 0   What work did she do? 0   What state did she live in? 0   Calculated SLUMS Score 0   SLUMS Comments Patient answered questions but not accuratly.    Licensure   [place] License Number  [facility] [id number] [facility] OTR/L [id number]

## 2023-10-06 NOTE — PROGRESS NOTES
Daily Progress Note - [facility] Residency  Parisa Benjamin 80 y.o. male MRN: [id number]  Unit/Bed#: [address] Encounter: [id number]  Admitting Physician: Raul Rangel MD   PCP: ROSITA Monae  Date of Admission:  9/29/2023  3:48 PM     Assessment and Plan     * Gross hematuria  Assessment & Plan     Acute on chronic.      Being followed per urology. Noted to have Pseudomonas in his urine cultures. Pt gross hematuria has improved. Noted no blood clots in catheter. CT abdomen: No hydronephrosis. 1.2 cm hyperattenuating focal lesion may be a hemorrhagic cyst versus a solid lesion, stable can be assessed with the MRI performed on nonemergent basis. Markedly limited evaluation of the urinary bladder if needed the urinary bladder can be assessed with the ultrasound. Pt underwent cystoscopy with transurethral resection bladder neck contracture retrograde pyelogram on !0/5. S/p day 1.     • Continue Zosyn, renal dose adjusted due to increase in Cr   • Discontinued CBI  • Restart Eliquis due to hematuria    • Being followed per urology; Recommendations appreciated  • Maintain site clean   • Continue Vazquez with antibiotics prophylaxis - Discontinue Vazquez tomorrow   • Soft restraints, since patient keeps pulling out the catheter    Acute on chronic kidney disease  Assessment & Plan     Chronic, recent increase in Cr. Baseline Cr around 2.0. Pt had a bump in his Cr levels. Today 2.23. Yesterday 1.85. Urine output adequate per vazquez catheter. Suspect either per dehydration (pre renal) or inflamation at the manipulated site (post renal). Pt also had elevated Na 147 and Chl 115.      · Increased pt free water in tube feeds  · 300 mL every 4 hours for total of 2200 mL - replacing deficit per free water  · Placed pt on soft IVF hydration with Isolyte 50 cc/hr for 10 hrs  · Renal dosed Zosyn  • Avoid nephrotoxic drugs  • Avoid dehydration  • Avoid hypotension   • Trend creatinine levels  • Dose adjustment for medications as appropriate    Tumor of right bladder wall   Assessment & Plan         New. Recent finding. Pt underwent bilateral cyto/turbnc on 10/5, S/p Day 1. Was found to have a 4 cm tumor in the wall of the right side of his bladder. Pt has history of prostate TURP. Hematuria stable and minimal today after procedure. · PSA sent for assessment of possible prostate cancer as primary cancer  · Biopsies taken of bladder mass - follow up on results  · Monitor urine output and hematuria  · Assess risk of being off AC w Eliquis vs Worsening hematuria  · Bilateral venous doppler sent     Catheter-associated urinary tract infection   Assessment & Plan     Patient has a history of developing chronic UTIs. UA showed red,cloudy, turbid urine, nitrite negative, but the results showed interference - unable to analyze. No fever overnight. VS stable. WBC Trending down   Urine culture:   ? 1st: > 100,000 cfu Pseudomonas  ? 2nd: 10,000 - 19,000 cfu Pseudomonas     • Patient pulling out vazquez's catheter, on soft restraints  • Renal dosed Zosyn due to GERMANIA - Continue 2.25 mg, IV, Q8H  • Urology following, appreciate recommendations  • See plan under gross hematuria    Acute and Chronic DVTs  Assessment & Plan     Chronic bilaterally and new DVT in lower extremities. Stable. No CP or PE. Bilateral venous doppler of lower extremities sent for reassessment of hx of DVT. Unclear past documentation and current management. Findings (10/6): RLL - Acute non-occlusive DVT noted in the EIV and CFV; Possible chronic non-occlusive DVT noted throughout the femoral vein into the distal popliteal. No superficial thrombophlebitis noted. LLL: Possible chronic non-occlusive DVT noted throughout the femoral vein.  No superficial thrombophlebitis      • Started patient on heparin drip with no bolus   • Can be discharged on Eliquis fo deemed appropriate     Severe dementia [facility])  Assessment & Plan     Chronic, upon chart review, no documentation was found for absence seizures, EEG or neurology note. Patient sees a neurologist for his Parkinson's. Pt waxes and wanes in mentation. Sometimes delirious and sometimes stable. Currently stable. • Continue Zoloft 100 mg daily  • Delirium Precautions   • Monitor mood and mental status   • Reorient patient    Depression   Assessment & Plan     Chronic, stable.      • Continue home dose of Zoloft 100 mg daily    Hypertension  Assessment & Plan     Chronic, stable     /61 mmHg.      • Continue Labetalol 200 mg, PO, Q8H  • Continue Amlodipine 10 mg, PO, QD  • Hold BP meds for SBP <130 mmHg  • Monitor blood pressure     PD (Parkinson's disease)  Assessment & Plan     Chronic, stable - resting tremors present in all 4 limbs, more pronounced on his left leg. Patient has been on carbidopa levodopa for a long time. Currently stable. • Continue home parkinson medications  • Delirium precautions     Functional quadriplegia   Assessment & Plan             Chronic. 2ry to Parkinsons with severe dementia as evidenced by requiring 100% total assistance with ADLs and maintaining safety, incontinence, and feeding tube, treated with providing % total care, tube feeding, turn and repositioning, and bilateral wrist restraints. · Continue above management     Dry eyes  Assessment & Plan     Patient has a chronic problem of having dry eyes.      • Continue putting artificial tears, 1 drop in both eyes as needed     Gastroesophageal reflux disease  Assessment & Plan     Chronic, stable.     • Continue home dose of famotidine 20 mg every other day     Chronic obstructive pulmonary disease (HCC)  Assessment & Plan     Chronic, stable.   No wheezing on exam.      • Continue home medications  • Supply oxygen if needed      Pressure injury of skin of right hip  Assessment & Plan     Patient has a chronic sacral wound, long with a right hip wound with skin peeling off indicating signs of pressure injury.      • Reposition every 2 hours  • Consult for wound care placed  • Monitor closely     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HCC)  Assessment & Plan     Chronic, stable     • Tube feeds restarted after procedure  • Gastrostomy tube in place  • Continue tube feeds per nutrition recommendations      Dysphagia  Assessment & Plan     Patient is unable to swallow his medication, failed speech evaluation at his prior hospital admission. G-tube was placed.       • Follow Speech and Swallow recommendations    VTE Pharmacologic Prophylaxis: VTE Score: 11 High Risk (Score >/= 5) - Pharmacological DVT Prophylaxis Contraindicated. Sequential Compression Devices Ordered. - Eliquis held due to hematuria. Patient Centered Rounds: I have performed bedside rounds with nursing staff today. Discussions with Specialists or Other Care Team Provider: Urology    Education and Discussions with Family / Patient: Daughter   Patient Information Sharing: With the consent of Christiano Carrasco, their loved ones (daughter) were notified today by inpatient team of the patient’s condition and current plan. All questions answered. Time Spent for Care: 35 minutes. More than 50% of total time spent on counseling and coordination of care as described above. Current Length of Stay: 7 day(s)    Current Patient Status: Inpatient   Certification Statement: The patient will continue to require additional inpatient hospital stay due to management of gross hematuria. Discharge Plan: Pending    Code Status: Level 3 - DNAR and DNI    Subjective:     Patient was evaluated at bedside. He was alone and in soft restraints for her arms. He was calm, awake and alert. Oriented to person only. Not mumbling. Answering questions limitedly. Refers no pain. In no acute distress.      Objective      Vitals:   Temp (24hrs), Av.4 °F (36.9 °C), Min:97.6 °F (36.4 °C), Max:99.9 °F (37.7 °C)    Temp:  [97.6 °F (36.4 °C)-99.9 °F (37.7 °C)] 99.9 °F (37.7 °C)  HR:  [64-80] 80  Resp:  [16-18] 17  BP: (103-122)/(52-78) 115/61  SpO2:  [89 %-98 %] 95 %  Body mass index is 26.5 kg/m². Input and Output Summary (last 24 hours): Intake/Output Summary (Last 24 hours) at 10/6/2023 0806  Last data filed at 10/5/2023 1720  Gross per 24 hour   Intake --   Output 1050 ml   Net -1050 ml     Physical Exam  Vitals and nursing note reviewed. Constitutional:       General: He is awake. He is not in acute distress. Appearance: He is overweight. He is not ill-appearing or toxic-appearing. Interventions: He is restrained. Comments: Calm   HENT:      Head: Normocephalic and atraumatic. Right Ear: External ear normal.      Left Ear: External ear normal.      Nose: Nose normal.      Mouth/Throat:      Mouth: Mucous membranes are dry. Comments: Dry oral mucosa. Cracked lips. Eyes:      General: No scleral icterus. Conjunctiva/sclera: Conjunctivae normal.   Cardiovascular:      Rate and Rhythm: Normal rate and regular rhythm. Pulses: Normal pulses. Heart sounds: Normal heart sounds. Pulmonary:      Effort: Pulmonary effort is normal.      Breath sounds: Normal breath sounds. Abdominal:      General: Abdomen is flat. Bowel sounds are normal. There is no distension. Palpations: Abdomen is soft. Tenderness: There is no abdominal tenderness. Genitourinary:     Penis: Normal.       Comments: Lieberman catheter in place. Minimal blood in site of insertion. Slight blood tinged urine noticed in bag. Musculoskeletal:         General: No swelling. Cervical back: Normal range of motion. Right lower leg: No edema. Left lower leg: No edema. Skin:     General: Skin is warm and dry. Comments: Horizontal linear scar on left breast    Neurological:      Mental Status: He is alert. Mental status is at baseline. He is disoriented. Comments: Oriented only to person.     Psychiatric: Attention and Perception: He does not perceive visual hallucinations. Mood and Affect: Mood normal. Mood is not anxious or depressed. Affect is not angry or tearful. Speech: Speech normal.         Behavior: Behavior is slowed. Behavior is not agitated or aggressive. Behavior is cooperative. Thought Content: Thought content is not paranoid or delusional.         Cognition and Memory: He does not exhibit impaired remote memory. Judgment: Judgment is not impulsive. Comments: Soft restraints on both hands. Additional Data:     Labs:    Results from last 7 days   Lab Units 10/06/23  0507   WBC Thousand/uL 8.54   HEMOGLOBIN g/dL 9.0*   HEMATOCRIT % 29.3*   PLATELETS Thousands/uL 272   NEUTROS PCT % 70   LYMPHS PCT % 13*   MONOS PCT % 7   EOS PCT % 9*     Results from last 7 days   Lab Units 10/06/23  0507   POTASSIUM mmol/L 4.6   CHLORIDE mmol/L 115*   CO2 mmol/L 26   BUN mg/dL 39*   CREATININE mg/dL 2.23*   CALCIUM mg/dL 8.4   ALK PHOS U/L 40   ALT U/L 3*   AST U/L 17     Results from last 7 days   Lab Units 09/29/23  1629   INR  1.13     Results from last 7 days   Lab Units 10/06/23  0614 10/05/23  2355 10/05/23  1948 10/05/23  1120 10/05/23  0736   POC GLUCOSE mg/dl 154* 123 126 132 125     Results from last 7 days   Lab Units 09/29/23  1629   HEMOGLOBIN A1C % 6.0*     * I Have Reviewed All Lab Data Listed Above. * Additional Pertinent Lab Tests Reviewed: All Labs For Current Hospital Admission Reviewed    Imaging:    Imaging Reports Reviewed Today Include: None  Imaging Personally Reviewed by Myself Includes:   All    Recent Cultures (last 7 days):     Results from last 7 days   Lab Units 09/29/23  2023 09/29/23  1630   URINE CULTURE  10,000-19,000 cfu/ml Pseudomonas aeruginosa*  <10,000 cfu/ml Providencia stuartii*  <10,000 cfu/ml Proteus species* >100,000 cfu/ml Pseudomonas aeruginosa*  >100,000 cfu/ml Providencia stuartii*  Proteus species*     Last 24 Hours Medication List:     Current Facility-Administered Medications   Medication Dose Route Frequency Provider Last Rate   • acetaminophen  650 mg Per G Tube Q6H PRN Yolanda Monroy MD     • aluminum-magnesium hydroxide-simethicone  30 mL Oral Q6H PRN Yolanda Monroy MD     • amLODIPine  10 mg Per G Tube Daily Yolanda Monroy MD     • artificial tear   Both Eyes Q4H PRN Yolanda Monroy MD     • ascorbic acid  250 mg Per G Tube Daily Yolanda Monroy MD     • budesonide  0.25 mg Nebulization BID Yolanda Monroy MD     • carbidopa-levodopa  1 tablet Per G Tube TID Yolanda Monroy MD     • famotidine  20 mg Per G Tube Every Other Day Yolanda Monroy MD     • folic acid  1 mg Per G Tube Daily Yolanda Monroy MD     • ipratropium-albuterol  3 mL Nebulization TID Yolanda Monroy MD     • labetalol  200 mg Per G Tube Q8H [facility] Yolanda Monroy MD     • lactated ringers  1,000 mL Intravenous Once PRN Yolanda Monroy MD      And   • lactated ringers  1,000 mL Intravenous Once PRN Yolanda Monroy MD     • lamoTRIgine  25 mg Per G Tube BID Yolanda Monroy MD     • latanoprost  1 drop Both Eyes HS Yolanda Monroy MD     • magnesium hydroxide  30 mL Per G Tube Daily PRN Yolanda Monroy MD     • melatonin  6 mg Per G Tube HS Yolanda Monroy MD     • nystatin   Topical TID Yolanda Monroy MD     • ondansetron  4 mg Intravenous Q6H PRN Yolanda Monroy MD     • oxybutynin  2.5 mg Per G Tube BID Yolanda Monroy MD     • oxyCODONE-acetaminophen  1 tablet Oral Once PRN Xiomara Stafford MD     • piperacillin-tazobactam  3.375 g Intravenous Q6H Yolanda Monroy MD 3.375 g (10/06/23 0600)   • saccharomyces boulardii  250 mg Per PEG Tube BID Yolanda Monroy MD     • senna  8.8 mg Oral HS Yolanda Monroy MD     • sertraline  100 mg Per G Tube Daily Yolanda Monroy MD     • sodium chloride  1,000 mL Intravenous Once PRN Yolanda Monroy MD      And   • sodium chloride  1,000 mL Intravenous Once PRN Yolanda Monroy MD        ** Please Note: Dictation voice to text software may have been used in the creation of this document.  **    Elmira Polk MD  10/06/23  8:06 AM

## 2023-10-06 NOTE — PROGRESS NOTES
Progress Note - Urology      Patient: Laura Flores   : 1940 Sex: male   MRN: [id number]     CSN: [id number]  Unit/Bed#: [address] [place] [address]     SUBJECTIVE:   Pt seen morning rounds  cbi clear  No issues overnite    Objective   Vitals: /61 (BP Location: Right arm)   Pulse 80   Temp 98.9 °F (37.2 °C)   Resp 18   Ht 5' 11" (1.803 m)   Wt 86.2 kg (190 lb)   SpO2 95%   BMI 26.50 kg/m²     I/O last 24 hours:   In: 100 [IV Piggyback:100]  Out: 1050 [Urine:1050]      Physical Exam:   General confused  Cardiac normal S1 normal S2  Abdomen soft, flank pain  Lieberman clear  Extremities no edema      Lab Results: CBC:   Lab Results   Component Value Date    WBC 8.54 10/06/2023    HGB 9.0 (L) 10/06/2023    HCT 29.3 (L) 10/06/2023    MCV 90 10/06/2023     10/06/2023    RBC 3.26 (L) 10/06/2023    MCH 27.6 10/06/2023    MCHC 30.7 (L) 10/06/2023    RDW 16.7 (H) 10/06/2023    MPV 9.3 10/06/2023    NRBC 0 10/06/2023     CMP:   Lab Results   Component Value Date     (H) 10/06/2023    CO2 26 10/06/2023    BUN 39 (H) 10/06/2023    CREATININE 2.23 (H) 10/06/2023    CALCIUM 8.4 10/06/2023    AST 17 10/06/2023    ALT 3 (L) 10/06/2023    ALKPHOS 40 10/06/2023    EGFR 26 10/06/2023     Urinalysis:   Lab Results   Component Value Date    COLORU Red 2023    CLARITYU Cloudy 2023    SPECGRAV 1.010 2023    PHUR 6.5 2023    LEUKOCYTESUR Interference- unable to analyze (A) 2023    NITRITE Interference- unable to analyze 2023    GLUCOSEU Negative 2023    KETONESU Interference- unable to analyze (A) 2023    BILIRUBINUR Interference- unable to analyze (A) 2023    BLOODU Interference- unable to analyze (A) 2023     Urine Culture:   Lab Results   Component Value Date    URINECX 10,000-19,000 cfu/ml Pseudomonas aeruginosa (A) 2023    URINECX <10,000 cfu/ml Providencia stuartii (A) 2023    URINECX <10,000 cfu/ml Proteus species (A) 2023     PSA: No results found for: "PSA"      Assessment/ Plan:  S/p cyto/turbnc  turbt day 1  D/c cbi  Vazquez bag drainage  D/c vazquez rola          Dorita Dumont MD

## 2023-10-06 NOTE — ASSESSMENT & PLAN NOTE
Prior to admission patient has reoccurring history of DVTs with chronic DVTs bilaterally. Patient previously required IVC which was initially intended for temporary use however has been continued. On admission lower limb duplex found evidence of acute right DVT in the EIV and CFV. · Patient started on Heparin  · Discontinued in setting of continued hematuria  · Consider Eliquis in 1 week for DVT prophylaxis.

## 2023-10-06 NOTE — PLAN OF CARE
Problem: Potential for Falls  Goal: Patient will remain free of falls  Description: INTERVENTIONS:  - Educate patient/family on patient safety including physical limitations  - Instruct patient to call for assistance with activity   - Consult OT/PT to assist with strengthening/mobility   - Keep Call bell within reach  - Keep bed low and locked with side rails adjusted as appropriate  - Keep care items and personal belongings within reach  - Initiate and maintain comfort rounds  - Make Fall Risk Sign visible to staff  - Offer Toileting every  Hours, in advance of need  - Initiate/Maintain alarm  - Obtain necessary fall risk management equipment:   - Apply yellow socks and bracelet for high fall risk patients  - Consider moving patient to room near nurses station  Outcome: Progressing     Problem: RESPIRATORY - ADULT  Goal: Achieves optimal ventilation and oxygenation  Description: INTERVENTIONS:  - Assess for changes in respiratory status  - Assess for changes in mentation and behavior  - Position to facilitate oxygenation and minimize respiratory effort  - Oxygen administered by appropriate delivery if ordered  - Initiate smoking cessation education as indicated  - Encourage broncho-pulmonary hygiene including cough, deep breathe, Incentive Spirometry  - Assess the need for suctioning and aspirate as needed  - Assess and instruct to report SOB or any respiratory difficulty  - Respiratory Therapy support as indicated  Outcome: Progressing     Problem: GENITOURINARY - ADULT  Goal: Maintains or returns to baseline urinary function  Description: INTERVENTIONS:  - Assess urinary function  - Encourage oral fluids to ensure adequate hydration if ordered  - Administer IV fluids as ordered to ensure adequate hydration  - Administer ordered medications as needed  - Offer frequent toileting  - Follow urinary retention protocol if ordered  Outcome: Progressing  Goal: Absence of urinary retention  Description: INTERVENTIONS:  - Assess patient’s ability to void and empty bladder  - Monitor I/O  - Bladder scan as needed  - Discuss with physician/AP medications to alleviate retention as needed  - Discuss catheterization for long term situations as appropriate  Outcome: Progressing  Goal: Urinary catheter remains patent  Description: INTERVENTIONS:  - Assess patency of urinary catheter  - If patient has a chronic vazquez, consider changing catheter if non-functioning  - Follow guidelines for intermittent irrigation of non-functioning urinary catheter  Outcome: Progressing     Problem: SKIN/TISSUE INTEGRITY - ADULT  Goal: Skin Integrity remains intact(Skin Breakdown Prevention)  Description: Assess:  -Perform Henry assessment every   -Clean and moisturize skin every   -Inspect skin when repositioning, toileting, and assisting with ADLS  -Assess under medical devices such as  every   -Assess extremities for adequate circulation and sensation     Bed Management:  -Have minimal linens on bed & keep smooth, unwrinkled  -Change linens as needed when moist or perspiring  -Avoid sitting or lying in one position for more than  hours while in bed  -Keep HOB at degrees     Toileting:  -Offer bedside commode  -Assess for incontinence every   -Use incontinent care products after each incontinent episode such as     Activity:  -Mobilize patient  times a day  -Encourage activity and walks on unit  -Encourage or provide ROM exercises   -Turn and reposition patient every  Hours  -Use appropriate equipment to lift or move patient in bed  -Instruct/ Assist with weight shifting every  when out of bed in chair  -Consider limitation of chair time  hour intervals    Skin Care:  -Avoid use of baby powder, tape, friction and shearing, hot water or constrictive clothing  -Relieve pressure over bony prominences using   -Do not massage red bony areas    Next Steps:  -Teach patient strategies to minimize risks such as    -Consider consults to interdisciplinary teams such as   Outcome: Progressing  Goal: Incision(s), wounds(s) or drain site(s) healing without S/S of infection  Description: INTERVENTIONS  - Assess and document dressing, incision, wound bed, drain sites and surrounding tissue  - Provide patient and family education  - Perform skin care/dressing changes every   Outcome: Progressing  Goal: Pressure injury heals and does not worsen  Description: Interventions:  - Implement low air loss mattress or specialty surface (Criteria met)  - Apply silicone foam dressing  - Instruct/assist with weight shifting every  minutes when in chair   - Limit chair time to  hour intervals  - Use special pressure reducing interventions such as  when in chair   - Apply fecal or urinary incontinence containment device   - Perform passive or active ROM every   - Turn and reposition patient & offload bony prominences every  hours   - Utilize friction reducing device or surface for transfers   - Consider consults to  interdisciplinary teams such as   - Use incontinent care products after each incontinent episode such as   - Consider nutrition services referral as needed  Outcome: Progressing     Problem: SAFETY ADULT  Goal: Patient will remain free of falls  Description: INTERVENTIONS:  - Educate patient/family on patient safety including physical limitations  - Instruct patient to call for assistance with activity   - Consult OT/PT to assist with strengthening/mobility   - Keep Call bell within reach  - Keep bed low and locked with side rails adjusted as appropriate  - Keep care items and personal belongings within reach  - Initiate and maintain comfort rounds  - Make Fall Risk Sign visible to staff  - Offer Toileting every  Hours, in advance of need  - Initiate/Maintain alarm  - Obtain necessary fall risk management equipment:   - Apply yellow socks and bracelet for high fall risk patients  - Consider moving patient to room near nurses station  Outcome: Progressing  Goal: Maintain or return to baseline ADL function  Description: INTERVENTIONS:  -  Assess patient's ability to carry out ADLs; assess patient's baseline for ADL function and identify physical deficits which impact ability to perform ADLs (bathing, care of mouth/teeth, toileting, grooming, dressing, etc.)  - Assess/evaluate cause of self-care deficits   - Assess range of motion  - Assess patient's mobility; develop plan if impaired  - Assess patient's need for assistive devices and provide as appropriate  - Encourage maximum independence but intervene and supervise when necessary  - Involve family in performance of ADLs  - Assess for home care needs following discharge   - Consider OT consult to assist with ADL evaluation and planning for discharge  - Provide patient education as appropriate  Outcome: Progressing  Goal: Maintains/Returns to pre admission functional level  Description: INTERVENTIONS:  - Perform BMAT or MOVE assessment daily.   - Set and communicate daily mobility goal to care team and patient/family/caregiver. - Collaborate with rehabilitation services on mobility goals if consulted  - Perform Range of Motion  times a day. - Reposition patient every  hours.   - Dangle patient  times a day  - Stand patient  times a day  - Ambulate patient  times a day  - Out of bed to chair  times a day   - Out of bed for meals  times a day  - Out of bed for toileting  - Record patient progress and toleration of activity level   Outcome: Progressing     Problem: SAFETY,RESTRAINT: NV/NON-SELF DESTRUCTIVE BEHAVIOR  Goal: Remains free of harm/injury (restraint for non violent/non self-detsructive behavior)  Description: INTERVENTIONS:  - Instruct patient/family regarding restraint use   - Assess and monitor physiologic and psychological status   - Provide interventions and comfort measures to meet assessed patient needs   - Identify and implement measures to help patient regain control  - Assess readiness for release of restraint   Outcome: Progressing  Goal: Returns to optimal restraint-free functioning  Description: INTERVENTIONS:  - Assess the patient's behavior and symptoms that indicate continued need for restraint  - Identify and implement measures to help patient regain control  - Assess readiness for release of restraint   Outcome: Progressing     Problem: Nutrition/Hydration-ADULT  Goal: Nutrient/Hydration intake appropriate for improving, restoring or maintaining nutritional needs  Description: Monitor and assess patient's nutrition/hydration status for malnutrition. Collaborate with interdisciplinary team and initiate plan and interventions as ordered. Monitor patient's weight and dietary intake as ordered or per policy. Utilize nutrition screening tool and intervene as necessary. Determine patient's food preferences and provide high-protein, high-caloric foods as appropriate.      INTERVENTIONS:  - Monitor oral intake, urinary output, labs, and treatment plans  - Assess nutrition and hydration status and recommend course of action  - Evaluate amount of meals eaten  - Assist patient with eating if necessary   - Allow adequate time for meals  - Recommend/ encourage appropriate diets, oral nutritional supplements, and vitamin/mineral supplements  - Order, calculate, and assess calorie counts as needed  - Recommend, monitor, and adjust tube feedings based on needs  - Assess need for intravenous fluids  - Provide specific nutrition/hydration education as appropriate  - Include patient/family/caregiver in decisions related to nutrition  Outcome: Progressing     Problem: MOBILITY - ADULT  Goal: Maintain or return to baseline ADL function  Description: INTERVENTIONS:  -  Assess patient's ability to carry out ADLs; assess patient's baseline for ADL function and identify physical deficits which impact ability to perform ADLs (bathing, care of mouth/teeth, toileting, grooming, dressing, etc.)  - Assess/evaluate cause of self-care deficits   - Assess range of motion  - Assess patient's mobility; develop plan if impaired  - Assess patient's need for assistive devices and provide as appropriate  - Encourage maximum independence but intervene and supervise when necessary  - Involve family in performance of ADLs  - Assess for home care needs following discharge   - Consider OT consult to assist with ADL evaluation and planning for discharge  - Provide patient education as appropriate  Outcome: Progressing  Goal: Maintains/Returns to pre admission functional level  Description: INTERVENTIONS:  - Perform BMAT or MOVE assessment daily.   - Set and communicate daily mobility goal to care team and patient/family/caregiver. - Collaborate with rehabilitation services on mobility goals if consulted  - Perform Range of Motion  times a day. - Reposition patient every  hours.   - Dangle patient  times a day  - Stand patient  times a day  - Ambulate patient  times a day  - Out of bed to chair  times a day   - Out of bed for meals  times a day  - Out of bed for toileting  - Record patient progress and toleration of activity level   Outcome: Progressing     Problem: Prexisting or High Potential for Compromised Skin Integrity  Goal: Skin integrity is maintained or improved  Description: INTERVENTIONS:  - Identify patients at risk for skin breakdown  - Assess and monitor skin integrity  - Assess and monitor nutrition and hydration status  - Monitor labs   - Assess for incontinence   - Turn and reposition patient  - Assist with mobility/ambulation  - Relieve pressure over bony prominences  - Avoid friction and shearing  - Provide appropriate hygiene as needed including keeping skin clean and dry  - Evaluate need for skin moisturizer/barrier cream  - Collaborate with interdisciplinary team   - Patient/family teaching  - Consider wound care consult   Outcome: Progressing

## 2023-10-06 NOTE — CASE MANAGEMENT
Case Management Discharge Planning Note    Patient name Abimbola Richter  Location [address] [facility] 80 MRN [id number]  : 1940 Date 10/6/2023       Current Admission Date: 2023  Current Admission Diagnosis:Gross hematuria   Patient Active Problem List    Diagnosis Date Noted   • Functional quadriplegia ([address]) 10/02/2023   • Gross hematuria 2023   • Type 2 diabetes mellitus without complication, without long-term current use of insulin ([address]) 2023   • Encounter for general adult medical examination with abnormal findings 2023   • Catheter-associated urinary tract infection  2023   • PD (Parkinson's disease) 2023   • Hypertension 2023   • Chronic kidney disease 2023   • Severe dementia ([address]) 2023   • Dysphagia 2023   • 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[address]) 2023   • Bedbound 2023   • Pressure injury of skin of right hip 2023   • Skin ulcer of sacrum ([address]) 2023   • H/O left mastectomy 2023   • History of prostate cancer 2023   • Arm DVT (deep venous thromboembolism), acute, left ([address]) 2023   • Indwelling Lieberman catheter present 2023   • Chronic obstructive pulmonary disease ([address]) 2023   • Constipation 2023   • Gastroesophageal reflux disease 2023   • Glaucoma of both eyes 2023   • Insomnia 2023   • Fungal rash of torso 2023   • Dry eyes 2023   • Depression 2023   • Benign prostatic hyperplasia 2023      LOS (days): 7  Geometric Mean LOS (GMLOS) (days): 4.90  Days to GMLOS:-2     OBJECTIVE:  Risk of Unplanned Readmission Score: 17.15     Current admission status: Inpatient   Preferred Pharmacy:   [facility] [address] [place] [address] - [address] [place] [address]  Phone: [phone] Fax: [phone]    Primary Care Provider: ROSITA Monae    Primary Insurance: MEDICARE  Secondary Insurance: Nakia santamaria CROSS    DISCHARGE DETAILS:    Discharge planning discussed with[de-identified] DaughterCynthia of Choice: Yes  Comments - Freedom of Choice: SW following to assist with DCP. SW placed call to pt's daughter to review plan and IMM. Daughter's plan remains for pt to return home with her and [facility] of [address] [facility]. Daughter expressed concern however because the DME order for hospital bed/low air loss mattress and tube feed is still not confirmed. Per daughter she had been doing bolus tube feeds initially and order had been submitted for a pump and continuous feeds. SW offered to follow up with [facility] again to inquire about order. SW will continue to follow to assist with planning. CM contacted family/caregiver?: Yes    Contacts  Patient Contacts: Pierce Guerrero  Relationship to Patient[de-identified] Family  Contact Method: Phone  Phone Number: [phone]  Reason/Outcome: Discharge Planning    Other Referral/Resources/Interventions Provided:  Interventions: DME  Referral Comments: Per [facility] the hospital bed/low air loss mattress order and the enteral feed order have not be processed completely. Call placed to [facility] at [address] [place] [address] to request assistance with process. Brenda Solis said she would review orders and call SW back. Treatment Team Recommendation: Short Term Rehab  Discharge Destination Plan[de-identified] Home with [facility] at Discharge : BLS Ambulance    IMM Given (Date):: 10/06/23  IMM Given to[de-identified] Family (IMM reviewed with daughter over phone. Daughter verbalized understanding. SW offered to leave copy in pt's room for daughter to retrieve when she arrives.   Copy also placed in scan bin for chart.)

## 2023-10-06 NOTE — ASSESSMENT & PLAN NOTE
During the current admission patient underwent TURPT. Suspicious mass was found approximately 4 cm tumor in the right side of patient's monitor. Concern for origin of the hematuria. Note patient is status post radical prostatectomy concern for retained prostate remnants versus bladder tumor. Awaiting biopsies however likely bladder tumor in setting of PSA only slightly elevated. On postop day 2 patient found to have decreased hematuria.      · Follow up on biopsy

## 2023-10-07 PROBLEM — N18.30 STAGE 3 CHRONIC KIDNEY DISEASE (HCC): Status: ACTIVE | Noted: 2023-10-07

## 2023-10-07 LAB
ALBUMIN SERPL BCP-MCNC: 2.7 G/DL (ref 3.5–5)
ALP SERPL-CCNC: 43 U/L (ref 34–104)
ALT SERPL W P-5'-P-CCNC: 4 U/L (ref 7–52)
ANION GAP SERPL CALCULATED.3IONS-SCNC: 8 MMOL/L
APTT PPP: 131 SECONDS (ref 23–37)
APTT PPP: 63 SECONDS (ref 23–37)
AST SERPL W P-5'-P-CCNC: 17 U/L (ref 13–39)
BASOPHILS # BLD AUTO: 0.07 THOUSANDS/ÂΜL (ref 0–0.1)
BASOPHILS NFR BLD AUTO: 1 % (ref 0–1)
BILIRUB SERPL-MCNC: 0.21 MG/DL (ref 0.2–1)
BUN SERPL-MCNC: 38 MG/DL (ref 5–25)
CALCIUM ALBUM COR SERPL-MCNC: 9.8 MG/DL (ref 8.3–10.1)
CALCIUM SERPL-MCNC: 8.8 MG/DL (ref 8.4–10.2)
CHLORIDE SERPL-SCNC: 113 MMOL/L (ref 96–108)
CO2 SERPL-SCNC: 24 MMOL/L (ref 21–32)
CREAT SERPL-MCNC: 2.25 MG/DL (ref 0.6–1.3)
EOSINOPHIL # BLD AUTO: 0.69 THOUSAND/ÂΜL (ref 0–0.61)
EOSINOPHIL NFR BLD AUTO: 8 % (ref 0–6)
ERYTHROCYTE [DISTWIDTH] IN BLOOD BY AUTOMATED COUNT: 16.6 % (ref 11.6–15.1)
GFR SERPL CREATININE-BSD FRML MDRD: 25 ML/MIN/1.73SQ M
GLUCOSE SERPL-MCNC: 116 MG/DL (ref 65–140)
GLUCOSE SERPL-MCNC: 121 MG/DL (ref 65–140)
GLUCOSE SERPL-MCNC: 122 MG/DL (ref 65–140)
GLUCOSE SERPL-MCNC: 127 MG/DL (ref 65–140)
GLUCOSE SERPL-MCNC: 128 MG/DL (ref 65–140)
GLUCOSE SERPL-MCNC: 132 MG/DL (ref 65–140)
HCT VFR BLD AUTO: 28.4 % (ref 36.5–49.3)
HGB BLD-MCNC: 8.5 G/DL (ref 12–17)
IMM GRANULOCYTES # BLD AUTO: 0.05 THOUSAND/UL (ref 0–0.2)
IMM GRANULOCYTES NFR BLD AUTO: 1 % (ref 0–2)
LYMPHOCYTES # BLD AUTO: 1.59 THOUSANDS/ÂΜL (ref 0.6–4.47)
LYMPHOCYTES NFR BLD AUTO: 19 % (ref 14–44)
MAGNESIUM SERPL-MCNC: 1.9 MG/DL (ref 1.9–2.7)
MCH RBC QN AUTO: 27.3 PG (ref 26.8–34.3)
MCHC RBC AUTO-ENTMCNC: 29.9 G/DL (ref 31.4–37.4)
MCV RBC AUTO: 91 FL (ref 82–98)
MONOCYTES # BLD AUTO: 0.61 THOUSAND/ÂΜL (ref 0.17–1.22)
MONOCYTES NFR BLD AUTO: 7 % (ref 4–12)
NEUTROPHILS # BLD AUTO: 5.59 THOUSANDS/ÂΜL (ref 1.85–7.62)
NEUTS SEG NFR BLD AUTO: 64 % (ref 43–75)
NRBC BLD AUTO-RTO: 0 /100 WBCS
PLATELET # BLD AUTO: 280 THOUSANDS/UL (ref 149–390)
PMV BLD AUTO: 9.6 FL (ref 8.9–12.7)
POTASSIUM SERPL-SCNC: 4.8 MMOL/L (ref 3.5–5.3)
PROT SERPL-MCNC: 6.3 G/DL (ref 6.4–8.4)
RBC # BLD AUTO: 3.11 MILLION/UL (ref 3.88–5.62)
SODIUM SERPL-SCNC: 145 MMOL/L (ref 135–147)
WBC # BLD AUTO: 8.6 THOUSAND/UL (ref 4.31–10.16)

## 2023-10-07 PROCEDURE — 94760 N-INVAS EAR/PLS OXIMETRY 1: CPT

## 2023-10-07 PROCEDURE — 85730 THROMBOPLASTIN TIME PARTIAL: CPT | Performed by: INTERNAL MEDICINE

## 2023-10-07 PROCEDURE — 99232 SBSQ HOSP IP/OBS MODERATE 35: CPT | Performed by: INTERNAL MEDICINE

## 2023-10-07 PROCEDURE — 94640 AIRWAY INHALATION TREATMENT: CPT

## 2023-10-07 PROCEDURE — 80053 COMPREHEN METABOLIC PANEL: CPT | Performed by: STUDENT IN AN ORGANIZED HEALTH CARE EDUCATION/TRAINING PROGRAM

## 2023-10-07 PROCEDURE — 83735 ASSAY OF MAGNESIUM: CPT

## 2023-10-07 PROCEDURE — 85025 COMPLETE CBC W/AUTO DIFF WBC: CPT

## 2023-10-07 PROCEDURE — 82948 REAGENT STRIP/BLOOD GLUCOSE: CPT

## 2023-10-07 RX ADMIN — PIPERACILLIN AND TAZOBACTAM 2.25 G: 2; .25 INJECTION, POWDER, LYOPHILIZED, FOR SOLUTION INTRAVENOUS at 22:26

## 2023-10-07 RX ADMIN — LAMOTRIGINE 25 MG: 25 TABLET ORAL at 17:31

## 2023-10-07 RX ADMIN — FOLIC ACID 1 MG: 1 TABLET ORAL at 10:00

## 2023-10-07 RX ADMIN — LATANOPROST 1 DROP: 50 SOLUTION OPHTHALMIC at 22:15

## 2023-10-07 RX ADMIN — IPRATROPIUM BROMIDE AND ALBUTEROL SULFATE 3 ML: 2.5; .5 SOLUTION RESPIRATORY (INHALATION) at 19:50

## 2023-10-07 RX ADMIN — PIPERACILLIN AND TAZOBACTAM 2.25 G: 2; .25 INJECTION, POWDER, LYOPHILIZED, FOR SOLUTION INTRAVENOUS at 05:50

## 2023-10-07 RX ADMIN — HEPARIN SODIUM 15 UNITS/KG/HR: 10000 INJECTION, SOLUTION INTRAVENOUS at 09:00

## 2023-10-07 RX ADMIN — PIPERACILLIN AND TAZOBACTAM 2.25 G: 2; .25 INJECTION, POWDER, LYOPHILIZED, FOR SOLUTION INTRAVENOUS at 00:10

## 2023-10-07 RX ADMIN — BUDESONIDE 0.25 MG: 0.25 INHALANT ORAL at 19:50

## 2023-10-07 RX ADMIN — IPRATROPIUM BROMIDE AND ALBUTEROL SULFATE 3 ML: 2.5; .5 SOLUTION RESPIRATORY (INHALATION) at 14:16

## 2023-10-07 RX ADMIN — PIPERACILLIN AND TAZOBACTAM 2.25 G: 2; .25 INJECTION, POWDER, LYOPHILIZED, FOR SOLUTION INTRAVENOUS at 16:00

## 2023-10-07 RX ADMIN — CARBIDOPA AND LEVODOPA 1 TABLET: 25; 100 TABLET ORAL at 16:00

## 2023-10-07 RX ADMIN — OXYBUTYNIN CHLORIDE 2.5 MG: 5 TABLET ORAL at 10:00

## 2023-10-07 RX ADMIN — NYSTATIN: 100000 POWDER TOPICAL at 16:01

## 2023-10-07 RX ADMIN — Medication 250 MG: at 10:00

## 2023-10-07 RX ADMIN — Medication 250 MG: at 17:31

## 2023-10-07 RX ADMIN — SERTRALINE HYDROCHLORIDE 100 MG: 100 TABLET ORAL at 10:55

## 2023-10-07 RX ADMIN — IPRATROPIUM BROMIDE AND ALBUTEROL SULFATE 3 ML: 2.5; .5 SOLUTION RESPIRATORY (INHALATION) at 07:06

## 2023-10-07 RX ADMIN — SENNOSIDES 8.8 MG: 8.8 SYRUP ORAL at 22:15

## 2023-10-07 RX ADMIN — CARBIDOPA AND LEVODOPA 1 TABLET: 25; 100 TABLET ORAL at 10:00

## 2023-10-07 RX ADMIN — OXYBUTYNIN CHLORIDE 2.5 MG: 5 TABLET ORAL at 17:31

## 2023-10-07 RX ADMIN — CARBIDOPA AND LEVODOPA 1 TABLET: 25; 100 TABLET ORAL at 22:14

## 2023-10-07 RX ADMIN — PIPERACILLIN AND TAZOBACTAM 2.25 G: 2; .25 INJECTION, POWDER, LYOPHILIZED, FOR SOLUTION INTRAVENOUS at 10:44

## 2023-10-07 RX ADMIN — Medication 6 MG: at 22:14

## 2023-10-07 RX ADMIN — LABETALOL HYDROCHLORIDE 200 MG: 100 TABLET, FILM COATED ORAL at 16:00

## 2023-10-07 RX ADMIN — NYSTATIN: 100000 POWDER TOPICAL at 11:18

## 2023-10-07 RX ADMIN — AMLODIPINE BESYLATE 10 MG: 10 TABLET ORAL at 10:00

## 2023-10-07 RX ADMIN — BUDESONIDE 0.25 MG: 0.25 INHALANT ORAL at 07:06

## 2023-10-07 RX ADMIN — LAMOTRIGINE 25 MG: 25 TABLET ORAL at 10:00

## 2023-10-07 RX ADMIN — NYSTATIN: 100000 POWDER TOPICAL at 22:15

## 2023-10-07 NOTE — PLAN OF CARE
Problem: Potential for Falls  Goal: Patient will remain free of falls  Description: INTERVENTIONS:  - Educate patient/family on patient safety including physical limitations  - Instruct patient to call for assistance with activity   - Consult OT/PT to assist with strengthening/mobility   - Keep Call bell within reach  - Keep bed low and locked with side rails adjusted as appropriate  - Keep care items and personal belongings within reach  - Initiate and maintain comfort rounds  - Make Fall Risk Sign visible to staff  - Offer Toileting every 2 Hours, in advance of need  - Initiate/Maintain bed alarm    - Apply yellow socks and bracelet for high fall risk patients  - Consider moving patient to room near nurses station  Outcome: Progressing     Problem: RESPIRATORY - ADULT  Goal: Achieves optimal ventilation and oxygenation  Description: INTERVENTIONS:  - Assess for changes in respiratory status  - Assess for changes in mentation and behavior  - Position to facilitate oxygenation and minimize respiratory effort  - Oxygen administered by appropriate delivery if ordered  - Initiate smoking cessation education as indicated  - Encourage broncho-pulmonary hygiene including cough, deep breathe, Incentive Spirometry  - Assess the need for suctioning and aspirate as needed  - Assess and instruct to report SOB or any respiratory difficulty  - Respiratory Therapy support as indicated  Outcome: Progressing     Problem: SAFETY ADULT  Goal: Patient will remain free of falls  Description: INTERVENTIONS:  - Educate patient/family on patient safety including physical limitations  - Instruct patient to call for assistance with activity   - Consult OT/PT to assist with strengthening/mobility   - Keep Call bell within reach  - Keep bed low and locked with side rails adjusted as appropriate  - Keep care items and personal belongings within reach  - Initiate and maintain comfort rounds  - Make Fall Risk Sign visible to staff  - Offer Toileting every 2 Hours, in advance of need  - Initiate/Maintain bed alarm    - Apply yellow socks and bracelet for high fall risk patients  - Consider moving patient to room near nurses station  Outcome: Progressing  Goal: Maintain or return to baseline ADL function  Description: INTERVENTIONS:  -  Assess patient's ability to carry out ADLs; assess patient's baseline for ADL function and identify physical deficits which impact ability to perform ADLs (bathing, care of mouth/teeth, toileting, grooming, dressing, etc.)  - Assess/evaluate cause of self-care deficits   - Assess range of motion  - Assess patient's mobility; develop plan if impaired  - Assess patient's need for assistive devices and provide as appropriate  - Encourage maximum independence but intervene and supervise when necessary  - Involve family in performance of ADLs  - Assess for home care needs following discharge   - Consider OT consult to assist with ADL evaluation and planning for discharge  - Provide patient education as appropriate  Outcome: Progressing  Goal: Maintains/Returns to pre admission functional level  Description: INTERVENTIONS:  - Perform BMAT or MOVE assessment daily.   - Set and communicate daily mobility goal to care team and patient/family/caregiver. - Collaborate with rehabilitation services on mobility goals if consulted  - Perform Range of Motion 3 times a day. - Reposition patient every 2 hours.         - Out of bed to chair daily as tolerated    -  - Record patient progress and toleration of activity level   Outcome: Progressing     Problem: GENITOURINARY - ADULT  Goal: Maintains or returns to baseline urinary function  Description: INTERVENTIONS:  - Assess urinary function  - Encourage oral fluids to ensure adequate hydration if ordered  - Administer IV fluids as ordered to ensure adequate hydration  - Administer ordered medications as needed  - Offer frequent toileting  - Follow urinary retention protocol if ordered  Outcome: Progressing  Goal: Absence of urinary retention  Description: INTERVENTIONS:  - Assess patient’s ability to void and empty bladder  - Monitor I/O  - Bladder scan as needed  - Discuss with physician/AP medications to alleviate retention as needed  - Discuss catheterization for long term situations as appropriate  Outcome: Progressing  Goal: Urinary catheter remains patent  Description: INTERVENTIONS:  - Assess patency of urinary catheter  - If patient has a chronic vazquez, consider changing catheter if non-functioning  - Follow guidelines for intermittent irrigation of non-functioning urinary catheter  Outcome: Progressing     Problem: SKIN/TISSUE INTEGRITY - ADULT  Goal: Incision(s), wounds(s) or drain site(s) healing without S/S of infection  Description: INTERVENTIONS  - Assess and document dressing, incision, wound bed, drain sites and surrounding tissue  - Provide patient and family education  - Perform skin care/dressing changes every shift and PRN  Outcome: Progressing  Goal: Pressure injury heals and does not worsen  Description: Interventions:  - Implement low air loss mattress or specialty surface (Criteria met)  - Apply silicone foam dressing  - Instruct/assist with weight shifting every 15-30 minutes when in chair   - Limit chair time to 2 hour intervals  - Use special pressure reducing interventions such as cushion when in chair   - Apply fecal or urinary incontinence containment device   - Perform passive or active ROM every 2 h  - Turn and reposition patient & offload bony prominences every 2 hours   - Utilize friction reducing device or surface for transfers   - Consider consults to  interdisciplinary teams such as  wound nurse  - Use incontinent care products after each incontinent episode such as foam cleanser  - Consider nutrition services referral as needed  Outcome: Progressing     Problem: SAFETY,RESTRAINT: NV/NON-SELF DESTRUCTIVE BEHAVIOR  Goal: Remains free of harm/injury (restraint for non violent/non self-detsructive behavior)  Description: INTERVENTIONS:  - Instruct patient/family regarding restraint use   - Assess and monitor physiologic and psychological status   - Provide interventions and comfort measures to meet assessed patient needs   - Identify and implement measures to help patient regain control  - Assess readiness for release of restraint   Outcome: Progressing  Goal: Returns to optimal restraint-free functioning  Description: INTERVENTIONS:  - Assess the patient's behavior and symptoms that indicate continued need for restraint  - Identify and implement measures to help patient regain control  - Assess readiness for release of restraint   Outcome: Progressing     Problem: Nutrition/Hydration-ADULT  Goal: Nutrient/Hydration intake appropriate for improving, restoring or maintaining nutritional needs  Description: Monitor and assess patient's nutrition/hydration status for malnutrition. Collaborate with interdisciplinary team and initiate plan and interventions as ordered. Monitor patient's weight and dietary intake as ordered or per policy. Utilize nutrition screening tool and intervene as necessary. Determine patient's food preferences and provide high-protein, high-caloric foods as appropriate.      INTERVENTIONS:  - Monitor oral intake, urinary output, labs, and treatment plans  - Assess nutrition and hydration status and recommend course of action  - Evaluate amount of meals eaten  - Assist patient with eating if necessary   - Allow adequate time for meals  - Recommend/ encourage appropriate diets, oral nutritional supplements, and vitamin/mineral supplements  - Order, calculate, and assess calorie counts as needed  - Recommend, monitor, and adjust tube feedings based on needs  - Assess need for intravenous fluids  - Provide specific nutrition/hydration education as appropriate  - Include patient/family/caregiver in decisions related to nutrition  Outcome: Progressing     Problem: MOBILITY - ADULT  Goal: Maintain or return to baseline ADL function  Description: INTERVENTIONS:  -  Assess patient's ability to carry out ADLs; assess patient's baseline for ADL function and identify physical deficits which impact ability to perform ADLs (bathing, care of mouth/teeth, toileting, grooming, dressing, etc.)  - Assess/evaluate cause of self-care deficits   - Assess range of motion  - Assess patient's mobility; develop plan if impaired  - Assess patient's need for assistive devices and provide as appropriate  - Encourage maximum independence but intervene and supervise when necessary  - Involve family in performance of ADLs  - Assess for home care needs following discharge   - Consider OT consult to assist with ADL evaluation and planning for discharge  - Provide patient education as appropriate  Outcome: Progressing  Goal: Maintains/Returns to pre admission functional level  Description: INTERVENTIONS:  - Perform BMAT or MOVE assessment daily.   - Set and communicate daily mobility goal to care team and patient/family/caregiver. - Collaborate with rehabilitation services on mobility goals if consulted  - Perform Range of Motion 3 times a day. - Reposition patient every 2 hours.         - Out of bed to chair daily as tolerated    -  - Record patient progress and toleration of activity level   Outcome: Progressing     Problem: Prexisting or High Potential for Compromised Skin Integrity  Goal: Skin integrity is maintained or improved  Description: INTERVENTIONS:  - Identify patients at risk for skin breakdown  - Assess and monitor skin integrity  - Assess and monitor nutrition and hydration status  - Monitor labs   - Assess for incontinence   - Turn and reposition patient  - Assist with mobility/ambulation  - Relieve pressure over bony prominences  - Avoid friction and shearing  - Provide appropriate hygiene as needed including keeping skin clean and dry  - Evaluate need for skin moisturizer/barrier cream  - Collaborate with interdisciplinary team   - Patient/family teaching  - Consider wound care consult   Outcome: Progressing

## 2023-10-07 NOTE — PROGRESS NOTES
Progress Note - Urology      Patient: Abimbola Richter   : 1940 Sex: male   MRN: [id number]     CSN: [id number]  Unit/Bed#: [address] [place] [address]     SUBJECTIVE:   Patient seen on afternoon rounds  No issues from nursing staff  Urine clear  We will DC Lieberman catheter at midnight  When seen on morning rounds if voiding be discharged home      Objective   Vitals: /90 (BP Location: Right arm)   Pulse 80   Temp 98.6 °F (37 °C) (Oral)   Resp 17   Ht 5' 11" (1.803 m)   Wt 85.5 kg (188 lb 7.9 oz)   SpO2 97%   BMI 26.29 kg/m²     I/O last 24 hours:   In: 1773.6 [I.V.:223.6; NG/GT:600; IV Piggyback:100; Feedings:850]  Out: 600 [Urine:600]      Physical Exam:   General Alert awake   Normocephalic atraumatic PERRLA  Lungs clear bilaterally  Cardiac normal S1 normal S2  Abdomen soft, flank pain  Extremities no edema      Lab Results: CBC:   Lab Results   Component Value Date    WBC 8.60 10/07/2023    HGB 8.5 (L) 10/07/2023    HCT 28.4 (L) 10/07/2023    MCV 91 10/07/2023     10/07/2023    RBC 3.11 (L) 10/07/2023    MCH 27.3 10/07/2023    MCHC 29.9 (L) 10/07/2023    RDW 16.6 (H) 10/07/2023    MPV 9.6 10/07/2023    NRBC 0 10/07/2023     CMP:   Lab Results   Component Value Date     (H) 10/07/2023    CO2 24 10/07/2023    BUN 38 (H) 10/07/2023    CREATININE 2.25 (H) 10/07/2023    CALCIUM 8.8 10/07/2023    AST 17 10/07/2023    ALT 4 (L) 10/07/2023    ALKPHOS 43 10/07/2023    EGFR 25 10/07/2023     Urinalysis:   Lab Results   Component Value Date    COLORU Red 2023    CLARITYU Cloudy 2023    SPECGRAV 1.010 2023    PHUR 6.5 2023    LEUKOCYTESUR Interference- unable to analyze (A) 2023    NITRITE Interference- unable to analyze 2023    GLUCOSEU Negative 2023    KETONESU Interference- unable to analyze (A) 2023    BILIRUBINUR Interference- unable to analyze (A) 2023    BLOODU Interference- unable to analyze (A) 2023     Urine Culture:   Lab Results Component Value Date    URINECX 10,000-19,000 cfu/ml Pseudomonas aeruginosa (A) 09/29/2023    URINECX <10,000 cfu/ml Providencia stuartii (A) 09/29/2023    URINECX <10,000 cfu/ml Proteus species (A) 09/29/2023     PSA:   Lab Results   Component Value Date    PSA 5.61 (H) 10/06/2023         Assessment/ Plan:  Status post cystoscopy TUR BNC bladder tumor day #2  DC Lieberman catheter midnight  [facility]            Lidia Medina MD

## 2023-10-07 NOTE — PROGRESS NOTES
Daily Progress Note - [facility] Residency  Emmie Degree 80 y.o. male MRN: [id number]  Unit/Bed#: [address] Encounter: [id number]  Admitting Physician: Sanjay Marley MD   PCP: ROSITA Monae  Date of Admission:  9/29/2023  3:48 PM    Assessment and Plan    * Gross hematuria  Assessment & Plan  Present on admission patient had blood in his Lieberman that daughter noticed. Prior to arrival patient was recently discharged from [facility] in [place] 2 days prior to mission for chronic UTI and bacteremia. Patient's daughter reported this was the first hematuria since discharge. In the ED patient was given new Lieberman due to patient attempting to pull out previous. During an admission patient had a TURPT found suspicious mass in bladder. PSA was slightly elevated, mass suspected to be bladder cancer versus retained prostate tissue status post prostatectomy. Of note patient's urine culture also positive for Pseudomonas.     · Patient's antibiotic regimen changed to Zosyn. · Patient will complete 5-day course on 10/8  · Urology consulted  · Plan for voiding trial on 10/7 to [facility]. 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 Suspicious mass was found approximately 4 cm tumor in the right side of patient's monitor. Concern for origin of the hematuria. Note patient is status post radical prostatectomy concern for retained prostate remnants versus bladder tumor. Awaiting biopsies however likely bladder tumor in setting of PSA only slightly elevated. On postop day 2 patient found to have decreased hematuria. · Follow up on biopsy     Catheter-associated urinary tract infection   Assessment & Plan  Patient has chronic/reoccurring history of UTI.   Patient's UA on admission was unable to be analyzed due to interference. Note patient most recently was discharged from [facility] for UTI. Patient's urine culture grew Pseudomonas. Patient's ceftriaxone was then changed to Zosyn. Patient's repeat urine culture showed Pseudomonas however decreased colonies. · Continue antibiotic treatment for 5-day course  · See plan under gross hematuria above  · Urology following, appreciate recommendations    Stage 3 chronic kidney disease [facility])  Assessment & Plan  Lab Results   Component Value Date    EGFR 25 10/07/2023    EGFR 26 10/06/2023    EGFR 32 10/05/2023    CREATININE 2.25 (H) 10/07/2023    CREATININE 2.23 (H) 10/06/2023    CREATININE 1.85 (H) 10/05/2023     Prior to admission patient has had a history of chronic kidney disease however due to patient's follow-up with out-of-state health systems previous baseline is unclear however on most recent discharged patient's Cr 2.2's and EGFR around 20's. · Avoid nephrotoxic drugs  · Avoid dehydration  · Avoid hypotension   · Trend creatinine levels  · Dose adjustment for medications as appropriate        Severe dementia [facility])  Assessment & Plan  Chart review patient noted to have a chronic history of dementia with progressive severity. Suspected in setting of Parkinson's disease. Patient of note has been seen by neurologist for his Parkinson's disease. Patient's home medication regimen consist of Lamictal, Zoloft and Parcopa. Of note patient has resting tremors present in all 4 limbs however more pronounced in his left leg. · Continue on home medication regimen  · Patient requiring soft restraints due to excessive pulling at Lieberman catheter        Chronic kidney disease  Assessment & Plan        Chronic deep vein thrombosis (DVT) of femoral vein of both lower extremities (HCC)  Assessment & Plan  Prior to admission patient has reoccurring history of DVTs with chronic DVTs bilaterally.   Patient previously required IVC which was initially intended for temporary use however has been continued. On admission lower limb duplex found evidence of acute right DVT in the EIV and CFV. · Patient started on Heparin  · Discontinued in setting of continued hematuria  · Consider Eliquis in 1 week for DVT prophylaxis. Functional quadriplegia [facility])  Assessment & Plan  Prior to admission patient has chronic history of functional quadriplegia likely secondary to Parkinson's and severe dementia. Patient requires 100% total assistance with ADLs. · Continue above management such as frequent repositioning, bilateral wrist restraints, tube feeding and etc         Depression  Assessment & Plan  Suspected due to chronic illnesses and other neurological conditions    · Continue home dose of Zoloft 100 mg daily    Dry eyes  Assessment & Plan  Prior to admission patient has chronic dry eyes. Patient is stable on artificial tears     · Continue 1 drop in both eyes as needed    Chronic obstructive pulmonary disease ([address])  Assessment & Plan  Prior to admission patient had a stable history of COPD. Throughout admission patient has not had any increased oxygenation need or wheezing on exam.  His home medication regimen consist of Pulmicort and DuoNebs. · Continue home medications    Pressure injury of skin of right hip  Assessment & Plan  Prior to admission patient reported to have chronic sacral wound as patient is nonambulatory. Patient's wound described as expanding with right hip with skin peeling. · Reposition every 2 hours  · Consult for wound care placed  · Follow recommendations  · Monitor closely        Dysphagia  Assessment & Plan  On prior hospitalization patient failed speech eval.  Patient has been unable to swallow his medications and at risk for aspiration so patient was given a G-tube. Patient now receiving enteric feeds and medication been changed accordingly.     · Consult for speech evaluation ordered  · Check gastrotomy tube daily  · Feeds consist of Glucerna 1.2, 85 mL/h continuous with water boluses every 4 hours. · During admission patient's water boluses were increased due to patient's increased creatinine    Hypertension  Assessment & Plan  Chronic, stable  · Patient was recently started on labetalol 200 mg every 8 hourly  · Continue his home medications  · Monitor blood pressure      VTE Pharmacologic Prophylaxis: VTE Score: 11 High Risk (Score >/= 5) - Pharmacological DVT Prophylaxis Contraindicated. Sequential Compression Devices Ordered. Patient Centered Rounds: I have performed bedside rounds with nursing staff today. Discussions with Specialists or Other Care Team Provider: Urology    Education and Discussions with Family / Patient:   Patient Information Sharing: With the consent of Asael Harris , their loved ones, Luz Marina Manzano, were notified today by inpatient team of the patient’s condition and current plan. All questions answered. Time Spent for Care: 30 minutes. More than 50% of total time spent on counseling and coordination of care as described above. Current Length of Stay: 8 day(s)    Current Patient Status: Inpatient   Certification Statement: The patient will continue to require additional inpatient hospital stay due to Voiding trial to [facility] catheter    Discharge Plan: DC to home when appropriate    Code Status: Level 3 - DNAR and DNI    Subjective:   Patient seen and examined at bedside. Patient's nurse reported no acute events overnight. Patient was noticed to have increased hematuria on his morning rounds. Patient was baseline confusion, ACOx1 (name only), however denied any shortness of breath, chest pain, palpitations, nausea, vomiting, fever, chills.      Objective     Objective:   Vitals:   Temp (24hrs), Av.2 °F (37.3 °C), Min:98.5 °F (36.9 °C), Max:100.4 °F (38 °C)    Temp:  [98.5 °F (36.9 °C)-100.4 °F (38 °C)] 98.6 °F (37 °C)  HR:  [72-80] 80  Resp:  [17-18] 17  BP: (118-124)/(48-90) 124/90  SpO2:  [93 %-97 %] 93 %  Body mass index is 26.29 kg/m². Input and Output Summary (last 24 hours): Intake/Output Summary (Last 24 hours) at 10/7/2023 1532  Last data filed at 10/7/2023 0604  Gross per 24 hour   Intake 1773.6 ml   Output 600 ml   Net 1173.6 ml       Physical Exam:   Physical Exam  Constitutional:       Appearance: Normal appearance. He is normal weight. HENT:      Nose: Nose normal.      Mouth/Throat:      Mouth: Mucous membranes are moist.      Pharynx: Oropharynx is clear. Eyes:      General: No scleral icterus. Extraocular Movements: Extraocular movements intact. Conjunctiva/sclera: Conjunctivae normal.   Cardiovascular:      Rate and Rhythm: Normal rate and regular rhythm. Pulses: Normal pulses. Heart sounds: Normal heart sounds. Pulmonary:      Effort: Pulmonary effort is normal.      Breath sounds: Normal breath sounds. Abdominal:      General: Bowel sounds are normal. There is no distension. Palpations: Abdomen is soft. Tenderness: There is no abdominal tenderness. There is no right CVA tenderness, left CVA tenderness, guarding or rebound. Genitourinary:      Musculoskeletal:      Right lower leg: No edema. Left lower leg: No edema. Skin:     General: Skin is warm and dry. Capillary Refill: Capillary refill takes less than 2 seconds. Neurological:      Mental Status: He is oriented to person, place, and time. Mental status is at baseline. Psychiatric:         Attention and Perception: He is inattentive. Mood and Affect: Mood normal.         Speech: Speech is tangential.         Behavior: Behavior normal.         Cognition and Memory: Cognition is impaired. Memory is impaired.          Additional Data:     Labs:  Results from last 7 days   Lab Units 10/07/23  0604   WBC Thousand/uL 8.60   HEMOGLOBIN g/dL 8.5*   HEMATOCRIT % 28.4*   PLATELETS Thousands/uL 280   NEUTROS PCT % 64   LYMPHS PCT % 19 MONOS PCT % 7   EOS PCT % 8*     Results from last 7 days   Lab Units 10/07/23  0604   POTASSIUM mmol/L 4.8   CHLORIDE mmol/L 113*   CO2 mmol/L 24   BUN mg/dL 38*   CREATININE mg/dL 2.25*   CALCIUM mg/dL 8.8   ALK PHOS U/L 43   ALT U/L 4*   AST U/L 17     Results from last 7 days   Lab Units 10/06/23  1651   INR  1.28*     Results from last 7 days   Lab Units 10/07/23  1055 10/07/23  0715 10/07/23  0055 10/06/23  2053 10/06/23  1554   POC GLUCOSE mg/dl 132 122 116 118 139           * I Have Reviewed All Lab Data Listed Above. * Additional Pertinent Lab Tests Reviewed: [address] Admission Reviewed    Imaging:    Imaging Reports Reviewed Today Include:   XR abdomen 1 view kub   Final Result      Fluoroscopic guidance provided for procedure guidance. Please refer to the separate procedure notes for additional details. Workstation performed: [id number]         CT abdomen pelvis wo contrast   Final Result      No hydronephrosis      1.2 cm hyperattenuating focal lesion may be a hemorrhagic cyst versus a solid lesion, stable can be assessed with the MRI performed on nonemergent basis   Markedly limited evaluation of the urinary bladder if needed the urinary bladder can be assessed with the ultrasound      The study was marked in EPIC for significant notification. Workstation performed: [id number]         CT abdomen pelvis wo contrast   Final Result      Low-lying Lieberman catheter with balloon in the region of the prostatic urethra. Recommend repositioning. No urinary tract calculi, although the base of the urinary bladder is obscured by streak artifact from patient's hip prostheses. No hydronephrosis. Additional findings as above. The study was marked in [facility] for immediate notification.          Workstation performed: [id number]         VAS lower limb venous duplex study, complete bilateral    (Results Pending)     Imaging Personally Reviewed by Myself Includes: No Please see above    Recent Cultures (last 7 days):           Last 24 Hours Medication List:   Current Facility-Administered Medications   Medication Dose Route Frequency Provider Last Rate   • acetaminophen  650 mg Per G Tube Q6H PRN Trisha Galindo MD     • aluminum-magnesium hydroxide-simethicone  30 mL Oral Q6H PRN Trisha Galindo MD     • amLODIPine  10 mg Per G Tube Daily Elmira Polk MD     • artificial tear   Both Eyes Q4H PRN Trisha Galindo MD     • ascorbic acid  250 mg Per G Tube Daily Trisha Galindo MD     • budesonide  0.25 mg Nebulization BID Trisha Galindo MD     • carbidopa-levodopa  1 tablet Per G Tube TID Trisha Galindo MD     • famotidine  20 mg Per G Tube Every Other Day Trisha Galindo MD     • folic acid  1 mg Per G Tube Daily Trisha Galindo MD     • ipratropium-albuterol  3 mL Nebulization TID Trisha Galindo MD     • labetalol  200 mg Per G Tube [facility] Elmira Polk MD     • lamoTRIgine  25 mg Per G Tube BID Trisha Galindo MD     • latanoprost  1 drop Both Eyes HS Trisha Galindo MD     • magnesium hydroxide  30 mL Per G Tube Daily PRN Trisha Galindo MD     • melatonin  6 mg Per G Tube HS Trisha Galindo MD     • nystatin   Topical TID Trisha Galindo MD     • ondansetron  4 mg Intravenous Q6H PRN Trisha Galindo MD     • oxybutynin  2.5 mg Per G Tube BID Trisha Galindo MD     • oxyCODONE-acetaminophen  1 tablet Oral Once PRN Sharvan Jo MD     • piperacillin-tazobactam  2.25 g Intravenous Q6H Nandini Cummings MD 2.25 g (10/07/23 1044)   • saccharomyces boulardii  250 mg Per PEG Tube BID Trisha Galindo MD     • senna  8.8 mg Oral HS Trisha Galindo MD     • sertraline  100 mg Per G Tube Daily Trisha Galindo MD               ** Please Note: Dictation voice to text software may have been used in the creation of this document.  **    Nandini Cummings MD  10/07/23  3:32 PM

## 2023-10-07 NOTE — PLAN OF CARE
Problem: GENITOURINARY - ADULT  Goal: Absence of urinary retention  Description: INTERVENTIONS:  - Assess patient’s ability to void and empty bladder  - Monitor I/O  - Bladder scan as needed  - Discuss with physician/AP medications to alleviate retention as needed  - Discuss catheterization for long term situations as appropriate  Outcome: Progressing     Problem: GENITOURINARY - ADULT  Goal: Urinary catheter remains patent  Description: INTERVENTIONS:  - Assess patency of urinary catheter  - If patient has a chronic vazquez, consider changing catheter if non-functioning  - Follow guidelines for intermittent irrigation of non-functioning urinary catheter  Outcome: Progressing

## 2023-10-07 NOTE — ASSESSMENT & PLAN NOTE
Lab Results   Component Value Date    EGFR 25 10/07/2023    EGFR 26 10/06/2023    EGFR 32 10/05/2023    CREATININE 2.25 (H) 10/07/2023    CREATININE 2.23 (H) 10/06/2023    CREATININE 1.85 (H) 10/05/2023     Prior to admission patient has had a history of chronic kidney disease however due to patient's follow-up with out-of-state health systems previous baseline is unclear however on most recent discharged patient's Cr 2.2's and EGFR around 20's.      · Avoid nephrotoxic drugs  · Avoid dehydration  · Avoid hypotension   · Trend creatinine levels  · Dose adjustment for medications as appropriate

## 2023-10-08 LAB
ANION GAP SERPL CALCULATED.3IONS-SCNC: 6 MMOL/L
BASOPHILS # BLD AUTO: 0.05 THOUSANDS/ÂΜL (ref 0–0.1)
BASOPHILS NFR BLD AUTO: 1 % (ref 0–1)
BUN SERPL-MCNC: 38 MG/DL (ref 5–25)
CALCIUM SERPL-MCNC: 8.2 MG/DL (ref 8.4–10.2)
CHLORIDE SERPL-SCNC: 108 MMOL/L (ref 96–108)
CO2 SERPL-SCNC: 25 MMOL/L (ref 21–32)
CREAT SERPL-MCNC: 2.34 MG/DL (ref 0.6–1.3)
EOSINOPHIL # BLD AUTO: 0.59 THOUSAND/ÂΜL (ref 0–0.61)
EOSINOPHIL NFR BLD AUTO: 6 % (ref 0–6)
ERYTHROCYTE [DISTWIDTH] IN BLOOD BY AUTOMATED COUNT: 16.5 % (ref 11.6–15.1)
GFR SERPL CREATININE-BSD FRML MDRD: 24 ML/MIN/1.73SQ M
GLUCOSE SERPL-MCNC: 113 MG/DL (ref 65–140)
GLUCOSE SERPL-MCNC: 122 MG/DL (ref 65–140)
GLUCOSE SERPL-MCNC: 124 MG/DL (ref 65–140)
GLUCOSE SERPL-MCNC: 142 MG/DL (ref 65–140)
GLUCOSE SERPL-MCNC: 142 MG/DL (ref 65–140)
GLUCOSE SERPL-MCNC: 94 MG/DL (ref 65–140)
GLUCOSE SERPL-MCNC: 99 MG/DL (ref 65–140)
HCT VFR BLD AUTO: 27 % (ref 36.5–49.3)
HGB BLD-MCNC: 8.6 G/DL (ref 12–17)
IMM GRANULOCYTES # BLD AUTO: 0.05 THOUSAND/UL (ref 0–0.2)
IMM GRANULOCYTES NFR BLD AUTO: 1 % (ref 0–2)
LYMPHOCYTES # BLD AUTO: 1.38 THOUSANDS/ÂΜL (ref 0.6–4.47)
LYMPHOCYTES NFR BLD AUTO: 15 % (ref 14–44)
MCH RBC QN AUTO: 28.2 PG (ref 26.8–34.3)
MCHC RBC AUTO-ENTMCNC: 31.9 G/DL (ref 31.4–37.4)
MCV RBC AUTO: 89 FL (ref 82–98)
MONOCYTES # BLD AUTO: 0.81 THOUSAND/ÂΜL (ref 0.17–1.22)
MONOCYTES NFR BLD AUTO: 9 % (ref 4–12)
NEUTROPHILS # BLD AUTO: 6.48 THOUSANDS/ÂΜL (ref 1.85–7.62)
NEUTS SEG NFR BLD AUTO: 68 % (ref 43–75)
NRBC BLD AUTO-RTO: 0 /100 WBCS
PLATELET # BLD AUTO: 264 THOUSANDS/UL (ref 149–390)
PMV BLD AUTO: 9.2 FL (ref 8.9–12.7)
POTASSIUM SERPL-SCNC: 4.4 MMOL/L (ref 3.5–5.3)
RBC # BLD AUTO: 3.05 MILLION/UL (ref 3.88–5.62)
SODIUM SERPL-SCNC: 139 MMOL/L (ref 135–147)
WBC # BLD AUTO: 9.36 THOUSAND/UL (ref 4.31–10.16)

## 2023-10-08 PROCEDURE — 94640 AIRWAY INHALATION TREATMENT: CPT

## 2023-10-08 PROCEDURE — 99232 SBSQ HOSP IP/OBS MODERATE 35: CPT | Performed by: INTERNAL MEDICINE

## 2023-10-08 PROCEDURE — 80048 BASIC METABOLIC PNL TOTAL CA: CPT

## 2023-10-08 PROCEDURE — 94760 N-INVAS EAR/PLS OXIMETRY 1: CPT

## 2023-10-08 PROCEDURE — 85025 COMPLETE CBC W/AUTO DIFF WBC: CPT

## 2023-10-08 PROCEDURE — 82948 REAGENT STRIP/BLOOD GLUCOSE: CPT

## 2023-10-08 RX ORDER — SODIUM CHLORIDE, SODIUM LACTATE, POTASSIUM CHLORIDE, CALCIUM CHLORIDE 600; 310; 30; 20 MG/100ML; MG/100ML; MG/100ML; MG/100ML
75 INJECTION, SOLUTION INTRAVENOUS CONTINUOUS
Status: DISCONTINUED | OUTPATIENT
Start: 2023-10-08 | End: 2023-10-09

## 2023-10-08 RX ADMIN — NYSTATIN: 100000 POWDER TOPICAL at 17:30

## 2023-10-08 RX ADMIN — IPRATROPIUM BROMIDE AND ALBUTEROL SULFATE 3 ML: 2.5; .5 SOLUTION RESPIRATORY (INHALATION) at 13:11

## 2023-10-08 RX ADMIN — LABETALOL HYDROCHLORIDE 200 MG: 100 TABLET, FILM COATED ORAL at 22:02

## 2023-10-08 RX ADMIN — OXYBUTYNIN CHLORIDE 2.5 MG: 5 TABLET ORAL at 10:00

## 2023-10-08 RX ADMIN — CARBIDOPA AND LEVODOPA 1 TABLET: 25; 100 TABLET ORAL at 17:29

## 2023-10-08 RX ADMIN — SENNOSIDES 8.8 MG: 8.8 SYRUP ORAL at 22:01

## 2023-10-08 RX ADMIN — SODIUM CHLORIDE, SODIUM LACTATE, POTASSIUM CHLORIDE, AND CALCIUM CHLORIDE 75 ML/HR: .6; .31; .03; .02 INJECTION, SOLUTION INTRAVENOUS at 23:56

## 2023-10-08 RX ADMIN — CARBIDOPA AND LEVODOPA 1 TABLET: 25; 100 TABLET ORAL at 10:00

## 2023-10-08 RX ADMIN — OXYBUTYNIN CHLORIDE 2.5 MG: 5 TABLET ORAL at 17:29

## 2023-10-08 RX ADMIN — FAMOTIDINE 20 MG: 20 TABLET, FILM COATED ORAL at 10:00

## 2023-10-08 RX ADMIN — LAMOTRIGINE 25 MG: 25 TABLET ORAL at 17:29

## 2023-10-08 RX ADMIN — LABETALOL HYDROCHLORIDE 200 MG: 100 TABLET, FILM COATED ORAL at 17:29

## 2023-10-08 RX ADMIN — BUDESONIDE 0.25 MG: 0.25 INHALANT ORAL at 07:18

## 2023-10-08 RX ADMIN — IPRATROPIUM BROMIDE AND ALBUTEROL SULFATE 3 ML: 2.5; .5 SOLUTION RESPIRATORY (INHALATION) at 07:18

## 2023-10-08 RX ADMIN — Medication 6 MG: at 22:00

## 2023-10-08 RX ADMIN — LAMOTRIGINE 25 MG: 25 TABLET ORAL at 10:00

## 2023-10-08 RX ADMIN — PIPERACILLIN AND TAZOBACTAM 2.25 G: 2; .25 INJECTION, POWDER, LYOPHILIZED, FOR SOLUTION INTRAVENOUS at 05:16

## 2023-10-08 RX ADMIN — CARBIDOPA AND LEVODOPA 1 TABLET: 25; 100 TABLET ORAL at 22:00

## 2023-10-08 RX ADMIN — SODIUM CHLORIDE, SODIUM LACTATE, POTASSIUM CHLORIDE, AND CALCIUM CHLORIDE 75 ML/HR: .6; .31; .03; .02 INJECTION, SOLUTION INTRAVENOUS at 11:06

## 2023-10-08 RX ADMIN — NYSTATIN: 100000 POWDER TOPICAL at 11:14

## 2023-10-08 RX ADMIN — FOLIC ACID 1 MG: 1 TABLET ORAL at 10:00

## 2023-10-08 RX ADMIN — LATANOPROST 1 DROP: 50 SOLUTION OPHTHALMIC at 22:01

## 2023-10-08 RX ADMIN — Medication 250 MG: at 10:00

## 2023-10-08 RX ADMIN — Medication 250 MG: at 17:29

## 2023-10-08 RX ADMIN — IPRATROPIUM BROMIDE AND ALBUTEROL SULFATE 3 ML: 2.5; .5 SOLUTION RESPIRATORY (INHALATION) at 19:49

## 2023-10-08 RX ADMIN — NYSTATIN: 100000 POWDER TOPICAL at 22:01

## 2023-10-08 RX ADMIN — BUDESONIDE 0.25 MG: 0.25 INHALANT ORAL at 19:49

## 2023-10-08 RX ADMIN — SERTRALINE HYDROCHLORIDE 100 MG: 100 TABLET ORAL at 10:00

## 2023-10-08 NOTE — DISCHARGE SUMMARY
Discharge Summary - [address] [place] [address] Medicine Residency     Patient Information: Asael Harris 80 y.o. male MRN: [id number]  Unit/Bed#: 205 [facility] Encounter: [id number]     Admitting Physician: Cristiano Carrington MD  Discharging Physician/Practitioner: Anne Lane MD  PCP: ROSITA Monae    Admission Date:     Admission Orders (From admission, onward)       Ordered        09/29/23 1950  INPATIENT ADMISSION  Once                        Discharge Date: 10/09/23      Reason for Admission: Blood in urine [R31.9]  Hematuria [R31.9]     Discharge Diagnoses:      Principal Problem:    Gross hematuria  Active Problems:    Catheter-associated urinary tract infection     Hypertension    Severe dementia ([address])    Dysphagia    Pressure injury of skin of right hip    Chronic obstructive pulmonary disease (HCC)    Dry eyes    Depression    Functional quadriplegia (HCC)    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 *    Consultations During Hospital Stay:  ·       Urology Dr Mckayla Bustamante     Procedures Performed:   ·       None     Significant Findings / Test Results:     10/7: Cr 2.25, Hgb 8.5,   10/6: , K4.6, , CR 2.23 PSA 5.6  10/5: , K4.4, , CR 1.85, MG 1.8  10/4: WBC 11.24, Hgb 9.4, Plts 271, Mag 1.9, K 4.3, Chl 112, BUN 40, Cr 1.82  10/3: WBC 11.98, Mg 2.0, K 3.9  10/2: WBC 18.50, K 3.9, Mg 1.9  10/1: WBC 14.69, Hgb 9.6, Plts 245, Mag 2.1, Na 137, K 3.9, Cr 2.33, BUN 52  9/30: Hgb 10.1, WBC 13.67<9.07, BUN 59, Cr 2.63, HbA1c 6.0   9/29:Hgb 10.4, Cr 2.35, BUN 58,     09/29: UA: turbid, red colored urine, nitrites negative -unable to analyze due to interference, Cr 1.35, Na 135, Mg 2.0, Hb 10.4, glucose 132.  hbA1c 6.0  Urine microscopic: Innumerable, epithelial cells      Incidental Findings:     CT abdomen pelvis wo contrast: Low-lying Vazquez catheter with balloon in the region of the prostatic urethra. Recommend repositioning., No urinary tract calculi, although the base of the urinary bladder is obscured by streak artifact from patient's hip prostheses. No hydronephrosis. , Additional findings as above. CT abdomen pelvis wo contrast: No hydronephrosis, 1.2 cm hyperattenuating focal lesion may be a hemorrhagic cyst versus a solid lesion, stable can be assessed with the MRI performed on nonemergent basis, Markedly limited evaluation of the urinary bladder if needed the urinary bladder can be assessed with the ultrasound,      Test Results Pending at Discharge (will require follow up): ·       BMP and CBC     Outpatient Tests Requested:  ·       BMP and CBC     Outpatient follow-up Requested:  ·       Urology and nephrology    Complications:  Bladder cancer    Things to address at first visit after hospitalization    Does the patient have any hematuria? Did the patient follow up outpatient with urology? Did the patient  have follow up imaging for renal focal lesion? Did the patient have labs checked - creatinine and sodium and CBC? Did the patient restart his Eliquis after discharged? Hospital Course:     Sohan Garcia is a 80 y.o. male patient with past medical history of prostate cancer status post radical prostatectomy, breast cancer s/p mastectomy, IVC filter, CKD, hypertension, Parkinson's diseasewho originally presented to the hospital on 9/29/2023 due to blood in his vazquez catheter. Patient had recent hospitalization in [place] for gram-positive bacteremia, UTI and metabolic encephalopathy. In the ED patient had difficulty with Vazquez catheter and stat urology consult was called. CT abdomen pelvis with a low-lying Vazquez catheter with balloon in the region of prostatic urethra. Patient underwent TURPT.   Suspicious mass was found approximately 4 cm tumor in the right side of patient's monitor. Patient most recently was discharged from [facility] for UTI. Patient's urine culture grew Pseudomonas. Patient's ceftriaxone was then changed to Zosyn. Patient's repeat urine culture showed Pseudomonas however decreased colonies. * Gross hematuria  Assessment & Plan     Acute on chronic. Being followed per urology. Noted to have Pseudomonas in his urine cultures. Pt gross hematuria has improved. Noted no blood clots in catheter. CT abdomen: No hydronephrosis. 1.2 cm hyperattenuating focal lesion may be a hemorrhagic cyst versus a solid lesion, stable can be assessed with the MRI performed on nonemergent basis. Markedly limited evaluation of the urinary bladder if needed the urinary bladder can be assessed with the ultrasound. Pt underwent cystoscopy with transurethral resection bladder neck contracture retrograde pyelogram on 10/5. Gross hematuria started again after starting IV heparin for DVT. Heparin discontinued. Urine yellow and normal today on discharge. No blood noted. Completed course of IV Zosyn - discontinued 10/6  Restart Eliquis in one week - normal dosage, no loading dose     Being followed per urology; recommendations appreciated - follow up OP  Urinary catheter removed yesterday at 11 PM-patient urinating in [facility]  Urology - voiding trial today - passed Maintain site clean   Soft restraints, since patient keeps pulling out the catheter     Acute on chronic kidney disease  Assessment & Plan     Chronic, recent increase in Cr. Baseline Cr around 2.0. Cr 1.85 > 2.23 > 2.34 > 2.20. Still elevated but trending down. Urine output adequate per vazquez catheter. Suspect either per dehydration (pre renal) or inflamation at the manipulated site (post renal). Given IVF hydration - continuous - LR @ 75 cc/hr. Pt also had elevated Na 147>139>138 and Chl 115>109>108. Improving.       Increased pt free water in tube feeds  200 mL every 4 hours for total of 2200 mL - replacing deficit per free water  Avoid nephrotoxic drugs  Avoid dehydration  Avoid hypotension   Repeat BMP  Dose adjustment for medications as appropriate     Tumor of right bladder wall   Assessment & Plan          New. Recent finding. Pt underwent bilateral cyto/turbnc on 10/5, S/p Day 1. Was found to have a 4 cm tumor in the wall of the right side of his bladder. Pt has history of prostate TURP. Hematuria stable and minimal today after procedure. PSA sent for assessment of possible prostate cancer as primary cancer - elevated at 5.61  Biopsies taken of bladder mass - follow up on results  Monitor urine output and hematuria     Catheter-associated urinary tract infection - Resolved  Assessment & Plan     Patient has a history of developing chronic UTIs. UA showed red,cloudy, turbid urine, nitrite negative, but the results showed interference - unable to analyze. No fever overnight. VS stable. WBC Trending down - 9.36 today  Urine culture:   1st: > 100,000 cfu Pseudomonas  2nd: 10,000 - 19,000 cfu Pseudomonas      Patient pulling out vazquez's catheter. Was on soft restraints. Completed course of [address]   Urology following, appreciate recommendations  Follow up OP w Urology  See plan under gross hematuria     Acute and Chronic DVTs  Assessment & Plan     Chronic bilaterally and new DVT in lower extremities. Stable. No CP or PE. Bilateral venous doppler of lower extremities sent for reassessment of hx of DVT. Unclear past documentation and current management. Findings (10/6): RLL - Acute non-occlusive DVT noted in the EIV and CFV; Possible chronic non-occlusive DVT noted throughout the femoral vein into the distal popliteal. No superficial thrombophlebitis noted. LLL: Possible chronic non-occlusive DVT noted throughout the femoral vein.  No superficial thrombophlebitis       Started patient on heparin drip with no bolus - discontinued due to reocurrence of hematuria  Can be discharged on Eliquis - start in one week at 5 mg, PO, BID - No loading dose      Severe dementia (HCC)  Assessment & Plan     Chronic, upon chart review, no documentation was found for absence seizures, EEG or neurology note. Patient sees a neurologist for his Parkinson's. Pt waxes and wanes in mentation. Sometimes delirious and sometimes stable. Currently stable. Continue Zoloft 100 mg daily  Delirium Precautions   Monitor mood and mental status   Reorient patient     Depression   Assessment & Plan     Chronic, stable. Continue home dose of Zoloft 100 mg daily     Hypertension  Assessment & Plan     Chronic, stable     /61 mmHg. Continue Labetalol 200 mg, PO, Q8H  Continue Amlodipine 10 mg, PO, QD  Hold BP meds for SBP <130 mmHg  Monitor blood pressure     PD (Parkinson's disease)  Assessment & Plan     Chronic, stable - resting tremors present in all 4 limbs, more pronounced on his left leg. Patient has been on carbidopa levodopa for a long time. Currently stable. Continue home parkinson medications  Delirium precautions      Functional quadriplegia   Assessment & Plan             Chronic. 2ry to Parkinsons with severe dementia as evidenced by requiring 100% total assistance with ADLs and maintaining safety, incontinence, and feeding tube, treated with providing % total care, tube feeding, turn and repositioning, and bilateral wrist restraints. Continue above management      Dry eyes  Assessment & Plan     Patient has a chronic problem of having dry eyes. Continue putting artificial tears, 1 drop in both eyes as needed     Gastroesophageal reflux disease  Assessment & Plan     Chronic, stable. Continue home dose of famotidine 20 mg every other day     Chronic obstructive pulmonary disease (HCC)  Assessment & Plan     Chronic, stable.   No wheezing on exam.      Continue home medications  Supply oxygen if needed      Pressure injury of skin of right hip  Assessment & Plan     Patient has a chronic sacral wound, long with a right hip wound with skin peeling off indicating signs of pressure injury. Reposition every 2 hours  Consult for wound care placed  Wound care discussed in AVS  Monitor closely     Gastrostomy tube dependent [facility])  Assessment & Plan     Chronic, stable     Tube feeds restarted after procedure  Gastrostomy tube in place  Continue tube feeds per nutrition recommendations      Dysphagia  Assessment & Plan     Patient is unable to swallow his medication, failed speech evaluation at his prior hospital admission. G-tube was placed. Follow Speech and Swallow recommendations    Condition at Discharge: Stable      Discharge Day Visit / Exam:      Vitals: Blood Pressure: 115/65 (10/09/23 1541)  Pulse: 68 (10/09/23 1541)  Temperature: 98.1 °F (36.7 °C) (10/09/23 1541)  Temp Source: Oral (10/08/23 2008)  Respirations: 16 (10/09/23 1541)  Height: 5' 11" (180.3 cm) (09/29/23 2204)  Weight - Scale: 86.4 kg (190 lb 8 oz) (10/09/23 0557)  SpO2: 97 % (10/09/23 1541)    Physical Exam  Vitals and nursing note reviewed. Constitutional:       General: He is not in acute distress. Appearance: He is not ill-appearing or toxic-appearing. HENT:      Head: Normocephalic and atraumatic. Right Ear: External ear normal.      Left Ear: External ear normal.      Nose: Nose normal.      Mouth/Throat:      Mouth: Mucous membranes are dry. Comments: Dry lips  Eyes:      General: No scleral icterus. Conjunctiva/sclera: Conjunctivae normal.   Cardiovascular:      Rate and Rhythm: Normal rate and regular rhythm. Pulses: Normal pulses. Heart sounds: Normal heart sounds. Pulmonary:      Effort: Pulmonary effort is normal.      Breath sounds: Normal breath sounds. Abdominal:      General: Abdomen is flat. Bowel sounds are normal. There is no distension. Palpations: Abdomen is soft. Tenderness: There is no abdominal tenderness.       Comments: Condom catheter   Genitourinary:     Penis: Normal.    Musculoskeletal:      Cervical back: Normal range of motion. Right lower leg: No edema. Left lower leg: No edema. Skin:     General: Skin is warm and dry. Neurological:      Mental Status: He is alert. Mental status is at baseline. He is disoriented. Comments: Answering questions, slow to speak     Discussion with Family:   Patient Information Sharing: With the consent of Raul Michel, their loved ones (Pao/daughter) were notified today by inpatient team of the patient’s condition and current plan. All questions answered. Discharge instructions/Information to patient and family:   See after visit summary for information provided to patient and family. Discharge Medications:     Medication List      START taking these medications     acetaminophen 325 mg tablet; Commonly known as: TYLENOL; 2 tablets (650   mg total) by Per G Tube route every 6 (six) hours as needed for mild pain   or headaches   labetalol 200 mg tablet; Commonly known as: NORMODYNE; 1 tablet (200 mg   total) by Per G Tube route every 8 (eight) hours   melatonin 3 mg; 1 tablet (3 mg total) by Per G Tube route daily at   bedtime; Replaces: Melatonin 5 MG/ML Liqd   oxyBUTYnin Chloride 2.5 MG Tabs; 2.5 mg by Per G Tube route 2 (two)   times a day   polyethylene glycol 17 g packet;  Commonly known as: MIRALAX; 17 g by Per   PEG Tube route daily   saccharomyces boulardii 250 mg capsule; Commonly known as: FLORASTOR; 1   capsule (250 mg total) by Per G Tube route 2 (two) times a day   senna 8.8 mg/5 mL oral syrup; 5 mL (8.8 mg total) by Per PEG Tube route   daily at bedtime     CHANGE how you take these medications     amLODIPine 10 mg tablet; Commonly known as: NORVASC; 1 tablet (10 mg   total) by Per G Tube route daily Do not start before October 10, 2023.;   What changed: how to take this   ascorbic acid 250 MG tablet; Commonly known as: VITAMIN C; 1 tablet (250 mg total) by Per G Tube route daily Do not start before October 10, 2023.;   What changed: how to take this   famotidine 20 mg tablet; Commonly known as: PEPCID; 1 tablet (20 mg   total) by Per G Tube route every other day Do not start before October 10,   2023.; What changed: how to take this, when to take this   folic acid 1 mg tablet; Commonly known as: KP Folic Acid; 1 tablet (1 mg   total) by Per G Tube route daily Do not start before October 10, 2023.;   What changed: how to take this   ipratropium-albuterol 0.5-2.5 mg/3 mL nebulizer solution; Commonly known   as: DUO-NEB; Take 3 mL by nebulization 3 (three) times a day; What   changed: when to take this   lamoTRIgine 25 mg tablet; Commonly known as: LaMICtal; 1 tablet (25 mg   total) by Per G Tube route 2 (two) times a day; What changed: how to take   this   sertraline 100 mg tablet; Commonly known as: ZOLOFT; 1 tablet (100 mg   total) by Per G Tube route daily Do not start before October 10, 2023.;   What changed: how to take this     CONTINUE taking these medications     Blood Glucose Monitoring Suppl Supplies Misc; Use every morning Check   FBG daily   budesonide 0.25 mg/2 mL nebulizer solution; Commonly known as:   Pulmicort; Take 2 mL (0.25 mg total) by nebulization 2 (two) times a day   Rinse mouth after use. carbidopa-levodopa  mg per disintegrating tablet; Commonly known   as: PARCOPA; Take 1 tablet by mouth 3 (three) times a day   latanoprost 0.005 % ophthalmic solution; Commonly known as: XALATAN;   Administer 1 drop to both eyes daily at bedtime   nystatin powder; Commonly known as: MYCOSTATIN; Apply topically 3   (three) times a day   polyvinyl alcohol 1.4 % ophthalmic solution; Commonly known as:   LIQUIFILM TEARS;  Administer 1 drop to both eyes as needed for dry eyes     STOP taking these medications     docusate 50 mg/5 mL liquid; Commonly known as: COLACE   magnesium hydroxide 400 mg/5 mL oral suspension; Commonly known as: MILK   OF MAGNESIA   Melatonin 5 MG/ML Liqd; Replaced by: melatonin 3 mg   tamsulosin 0.4 mg; Commonly known as: FLOMAX     Disposition:   Home with [facility] Services (Reminder: Complete face to face encounter)    Discharge Statement:  I spent >45 minutes discharging the patient. This time was spent on the day of discharge. I had direct contact with the patient on the day of discharge. Greater than 50% of the total time was spent examining patient, answering all patient questions, arranging and discussing plan of care with patient as well as directly providing post-discharge instructions. Additional time then spent on discharge activities.      ** Please Note: This note has been constructed using a voice recognition system **     Lyudmila Hobbs MD   10/11/23  12:27 AM

## 2023-10-08 NOTE — DISCHARGE INSTR - AVS FIRST PAGE
#1 no heavy straining or lifting above 10 pounds for 2 weeks    #2 call office fevers, chills, or worsening blood in the urine. #3 patient not necessary for office visits in light of poor ambulatory status we will discuss pathology with daughter via phone call no straining lifting aspirin for 2 weeks      Krystle Barrera M.D. [address] [place] [address] office  [address]  [phone]  8:30 AM to 4:30 PM  Monday through Friday    [facility] office  [phone] [facility]), [address] [place] [address]  [phone]  1:00 to 5:00 PM  Wednesday

## 2023-10-08 NOTE — PLAN OF CARE
Problem: Potential for Falls  Goal: Patient will remain free of falls  Description: INTERVENTIONS:  - Educate patient/family on patient safety including physical limitations  - Instruct patient to call for assistance with activity   - Consult OT/PT to assist with strengthening/mobility   - Keep Call bell within reach  - Keep bed low and locked with side rails adjusted as appropriate  - Keep care items and personal belongings within reach  - Initiate and maintain comfort rounds  - Make Fall Risk Sign visible to staff  - Offer Toileting every 2 Hours, in advance of need  - Initiate/Maintain bed alarm    - Apply yellow socks and bracelet for high fall risk patients  - Consider moving patient to room near nurses station  Outcome: Progressing     Problem: RESPIRATORY - ADULT  Goal: Achieves optimal ventilation and oxygenation  Description: INTERVENTIONS:  - Assess for changes in respiratory status  - Assess for changes in mentation and behavior  - Position to facilitate oxygenation and minimize respiratory effort  - Oxygen administered by appropriate delivery if ordered  - Initiate smoking cessation education as indicated  - Encourage broncho-pulmonary hygiene including cough, deep breathe, Incentive Spirometry  - Assess the need for suctioning and aspirate as needed  - Assess and instruct to report SOB or any respiratory difficulty  - Respiratory Therapy support as indicated  Outcome: Progressing     Problem: GENITOURINARY - ADULT  Goal: Maintains or returns to baseline urinary function  Description: INTERVENTIONS:  - Assess urinary function  - Encourage oral fluids to ensure adequate hydration if ordered  - Administer IV fluids as ordered to ensure adequate hydration  - Administer ordered medications as needed  - Offer frequent toileting  - Follow urinary retention protocol if ordered  Outcome: Progressing  Goal: Absence of urinary retention  Description: INTERVENTIONS:  - Assess patient’s ability to void and empty bladder  - Monitor I/O  - Bladder scan as needed  - Discuss with physician/AP medications to alleviate retention as needed  - Discuss catheterization for long term situations as appropriate  Outcome: Progressing  Goal: Urinary catheter remains patent  Description: INTERVENTIONS:  - Assess patency of urinary catheter  - If patient has a chronic vazquez, consider changing catheter if non-functioning  - Follow guidelines for intermittent irrigation of non-functioning urinary catheter  Outcome: Progressing     Problem: SKIN/TISSUE INTEGRITY - ADULT  Goal: Incision(s), wounds(s) or drain site(s) healing without S/S of infection  Description: INTERVENTIONS  - Assess and document dressing, incision, wound bed, drain sites and surrounding tissue  - Provide patient and family education  - Perform skin care/dressing changes every shift and PRN  Outcome: Progressing  Goal: Pressure injury heals and does not worsen  Description: Interventions:  - Implement low air loss mattress or specialty surface (Criteria met)  - Apply silicone foam dressing  - Instruct/assist with weight shifting every 15-30 minutes when in chair   - Limit chair time to 2 hour intervals  - Use special pressure reducing interventions such as cushion when in chair   - Apply fecal or urinary incontinence containment device   - Perform passive or active ROM every 2 h  - Turn and reposition patient & offload bony prominences every 2 hours   - Utilize friction reducing device or surface for transfers   - Consider consults to  interdisciplinary teams such as  wound nurse  - Use incontinent care products after each incontinent episode such as foam cleanser  - Consider nutrition services referral as needed  Outcome: Progressing     Problem: SAFETY,RESTRAINT: NV/NON-SELF DESTRUCTIVE BEHAVIOR  Goal: Remains free of harm/injury (restraint for non violent/non self-detsructive behavior)  Description: INTERVENTIONS:  - Instruct patient/family regarding restraint use   - Assess and monitor physiologic and psychological status   - Provide interventions and comfort measures to meet assessed patient needs   - Identify and implement measures to help patient regain control  - Assess readiness for release of restraint   Outcome: Progressing  Goal: Returns to optimal restraint-free functioning  Description: INTERVENTIONS:  - Assess the patient's behavior and symptoms that indicate continued need for restraint  - Identify and implement measures to help patient regain control  - Assess readiness for release of restraint   Outcome: Progressing     Problem: Nutrition/Hydration-ADULT  Goal: Nutrient/Hydration intake appropriate for improving, restoring or maintaining nutritional needs  Description: Monitor and assess patient's nutrition/hydration status for malnutrition. Collaborate with interdisciplinary team and initiate plan and interventions as ordered. Monitor patient's weight and dietary intake as ordered or per policy. Utilize nutrition screening tool and intervene as necessary. Determine patient's food preferences and provide high-protein, high-caloric foods as appropriate.      INTERVENTIONS:  - Monitor oral intake, urinary output, labs, and treatment plans  - Assess nutrition and hydration status and recommend course of action  - Evaluate amount of meals eaten  - Assist patient with eating if necessary   - Allow adequate time for meals  - Recommend/ encourage appropriate diets, oral nutritional supplements, and vitamin/mineral supplements  - Order, calculate, and assess calorie counts as needed  - Recommend, monitor, and adjust tube feedings based on needs  - Assess need for intravenous fluids  - Provide specific nutrition/hydration education as appropriate  - Include patient/family/caregiver in decisions related to nutrition  Outcome: Progressing     Problem: MOBILITY - ADULT  Goal: Maintain or return to baseline ADL function  Description: INTERVENTIONS:  -  Assess patient's ability to carry out ADLs; assess patient's baseline for ADL function and identify physical deficits which impact ability to perform ADLs (bathing, care of mouth/teeth, toileting, grooming, dressing, etc.)  - Assess/evaluate cause of self-care deficits   - Assess range of motion  - Assess patient's mobility; develop plan if impaired  - Assess patient's need for assistive devices and provide as appropriate  - Encourage maximum independence but intervene and supervise when necessary  - Involve family in performance of ADLs  - Assess for home care needs following discharge   - Consider OT consult to assist with ADL evaluation and planning for discharge  - Provide patient education as appropriate  Outcome: Progressing  Goal: Maintains/Returns to pre admission functional level  Description: INTERVENTIONS:  - Perform BMAT or MOVE assessment daily.   - Set and communicate daily mobility goal to care team and patient/family/caregiver. - Collaborate with rehabilitation services on mobility goals if consulted  - Perform Range of Motion 3 times a day. - Reposition patient every 2 hours.         - Out of bed to chair daily as tolerated    -  - Record patient progress and toleration of activity level   Outcome: Progressing     Problem: Prexisting or High Potential for Compromised Skin Integrity  Goal: Skin integrity is maintained or improved  Description: INTERVENTIONS:  - Identify patients at risk for skin breakdown  - Assess and monitor skin integrity  - Assess and monitor nutrition and hydration status  - Monitor labs   - Assess for incontinence   - Turn and reposition patient  - Assist with mobility/ambulation  - Relieve pressure over bony prominences  - Avoid friction and shearing  - Provide appropriate hygiene as needed including keeping skin clean and dry  - Evaluate need for skin moisturizer/barrier cream  - Collaborate with interdisciplinary team   - Patient/family teaching  - Consider wound care consult   Outcome: Progressing

## 2023-10-08 NOTE — PROGRESS NOTES
Daily Progress Note - [facility]  [facility] 80 y.o. male MRN: [id number]  Unit/Bed#: [address] Encounter: [id number]  Admitting Physician: Krystal Navarro MD   PCP: ROSITA Monae  Date of Admission:  9/29/2023  3:48 PM     Assessment and Plan     * Gross hematuria  Assessment & Plan     Acute on chronic.      Being followed per urology. Noted to have Pseudomonas in his urine cultures. Pt gross hematuria has improved. Noted no blood clots in catheter. CT abdomen: No hydronephrosis. 1.2 cm hyperattenuating focal lesion may be a hemorrhagic cyst versus a solid lesion, stable can be assessed with the MRI performed on nonemergent basis. Markedly limited evaluation of the urinary bladder if needed the urinary bladder can be assessed with the ultrasound. Pt underwent cystoscopy with transurethral resection bladder neck contracture retrograde pyelogram on 10/5. S/p day 3. Gross hematuria started again after starting IV heparin for DVT. Heparin discontinued. Urine yellow and normal today. No blood noted. • Completed course of IV Zosyn - discontinued 10/6  • Restart Eliquis in one week - normal dosage, no loading dose     • Being followed per urology; recommendations appreciated  • Urinary catheter removed yesterday at 11 PM-patient urinating in pamper  • Urology - voiding trial today and discharge pending path  • Maintain site clean   • Soft restraints, since patient keeps pulling out the catheter  • Can discharge if stable and voiding     Acute on chronic kidney disease  Assessment & Plan     Chronic, recent increase in Cr. Baseline Cr around 2.0. Pt had a bump in his Cr levels. Cr 2.34 today. Still elevated. Yesterday 2.23 from 1.85. Urine output adequate per vazquez catheter. Suspect either per dehydration (pre renal) or inflamation at the manipulated site (post renal). Pt also had elevated Na 147>139 and Chl 115>108. Improving. · Increased pt free water in tube feeds  · 200 mL every 4 hours for total of 2200 mL - replacing deficit per free water  · IVF hydration - continuous - LR @ 75 cc/hr  • Avoid nephrotoxic drugs  • Avoid dehydration  • Avoid hypotension   • Trend creatinine levels  • Dose adjustment for medications as appropriate    Tumor of right bladder wall   Assessment & Plan         New. Recent finding. Pt underwent bilateral cyto/turbnc on 10/5, S/p Day 1. Was found to have a 4 cm tumor in the wall of the right side of his bladder. Pt has history of prostate TURP. Hematuria stable and minimal today after procedure. · PSA sent for assessment of possible prostate cancer as primary cancer - elevated at 5.61  · Biopsies taken of bladder mass - follow up on results  · Monitor urine output and hematuria    Catheter-associated urinary tract infection - Resolved  Assessment & Plan     Patient has a history of developing chronic UTIs. UA showed red,cloudy, turbid urine, nitrite negative, but the results showed interference - unable to analyze. No fever overnight. VS stable. WBC Trending down - 9.36 today  Urine culture:   ? 1st: > 100,000 cfu Pseudomonas  ? 2nd: 10,000 - 19,000 cfu Pseudomonas     • Patient pulling out vazquez's catheter, on soft restraints  • Completed course of ZOSYN   • Urology following, appreciate recommendations  • See plan under gross hematuria    Acute and Chronic DVTs  Assessment & Plan     Chronic bilaterally and new DVT in lower extremities. Stable. No CP or PE. Bilateral venous doppler of lower extremities sent for reassessment of hx of DVT. Unclear past documentation and current management. Findings (10/6): RLL - Acute non-occlusive DVT noted in the EIV and CFV; Possible chronic non-occlusive DVT noted throughout the femoral vein into the distal popliteal. No superficial thrombophlebitis noted. LLL: Possible chronic non-occlusive DVT noted throughout the femoral vein.  No superficial thrombophlebitis      • Started patient on heparin drip with no bolus - discontinued due to reocurrence of hematuria  • Can be discharged on Eliquis - start in one week at 5 mg, PO, BID - No loading dose     Severe dementia (HCC)  Assessment & Plan     Chronic, upon chart review, no documentation was found for absence seizures, EEG or neurology note. Patient sees a neurologist for his Parkinson's. Pt waxes and wanes in mentation. Sometimes delirious and sometimes stable. Currently stable. • Continue Zoloft 100 mg daily  • Delirium Precautions   • Monitor mood and mental status   • Reorient patient    Depression   Assessment & Plan     Chronic, stable.      • Continue home dose of Zoloft 100 mg daily    Hypertension  Assessment & Plan     Chronic, stable     /61 mmHg.      • Continue Labetalol 200 mg, PO, Q8H  • Continue Amlodipine 10 mg, PO, QD  • Hold BP meds for SBP <130 mmHg  • Monitor blood pressure     PD (Parkinson's disease)  Assessment & Plan     Chronic, stable - resting tremors present in all 4 limbs, more pronounced on his left leg. Patient has been on carbidopa levodopa for a long time. Currently stable. • Continue home parkinson medications  • Delirium precautions     Functional quadriplegia   Assessment & Plan             Chronic. 2ry to Parkinsons with severe dementia as evidenced by requiring 100% total assistance with ADLs and maintaining safety, incontinence, and feeding tube, treated with providing % total care, tube feeding, turn and repositioning, and bilateral wrist restraints.     · Continue above management     Dry eyes  Assessment & Plan     Patient has a chronic problem of having dry eyes.      • Continue putting artificial tears, 1 drop in both eyes as needed     Gastroesophageal reflux disease  Assessment & Plan     Chronic, stable.     • Continue home dose of famotidine 20 mg every other day     Chronic obstructive pulmonary disease ([address])  Assessment & Plan     Chronic, stable. No wheezing on exam.      • Continue home medications  • Supply oxygen if needed      Pressure injury of skin of right hip  Assessment & Plan     Patient has a chronic sacral wound, long with a right hip wound with skin peeling off indicating signs of pressure injury.      • Reposition every 2 hours  • Consult for wound care placed  • Monitor closely     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HCC)  Assessment & Plan     Chronic, stable     • Tube feeds restarted after procedure  • Gastrostomy tube in place  • Continue tube feeds per nutrition recommendations      Dysphagia  Assessment & Plan     Patient is unable to swallow his medication, failed speech evaluation at his prior hospital admission. G-tube was placed.       • Follow Speech and Swallow recommendations    VTE Pharmacologic Prophylaxis: VTE Score: 11 High Risk (Score >/= 5) - Pharmacological DVT Prophylaxis Contraindicated. Sequential Compression Devices Ordered. - Eliquis held due to hematuria. Patient Centered Rounds: I have performed bedside rounds with nursing staff today. Discussions with Specialists or Other Care Team Provider: Urology    Education and Discussions with Family / Patient: Daughter   Patient Information Sharing: With the consent of Miesha Kearns, their loved ones (daughter) were notified today by inpatient team of the patient’s condition and current plan. All questions answered. Time Spent for Care: 35 minutes. More than 50% of total time spent on counseling and coordination of care as described above. Current Length of Stay: 9 day(s)    Current Patient Status: Inpatient   Certification Statement: The patient will continue to require additional inpatient hospital stay due to management of gross hematuria. Discharge Plan: Pending    Code Status: Level 3 - DNAR and DNI    Subjective:     Patient was evaluated at bedside. Patient was calm and resting comfortably. In no acute distress.   No major events throughout the night according to nursing staff. Objective      Vitals:   Temp (24hrs), Av.9 °F (37.2 °C), Min:98.3 °F (36.8 °C), Max:99.3 °F (37.4 °C)    Temp:  [98.3 °F (36.8 °C)-99.3 °F (37.4 °C)] 99.3 °F (37.4 °C)  HR:  [66-79] 66  Resp:  [16-20] 16  BP: (108-133)/(66-70) 133/70  SpO2:  [94 %-100 %] 97 %  Body mass index is 26.29 kg/m². Input and Output Summary (last 24 hours): Intake/Output Summary (Last 24 hours) at 10/8/2023 1914  Last data filed at 10/8/2023 1733  Gross per 24 hour   Intake 3820 ml   Output 1450 ml   Net 2370 ml     Physical Exam  Vitals and nursing note reviewed. Constitutional:       General: He is not in acute distress. Appearance: He is not ill-appearing or toxic-appearing. HENT:      Head: Normocephalic and atraumatic. Right Ear: External ear normal.      Left Ear: External ear normal.   Eyes:      General: No scleral icterus. Conjunctiva/sclera: Conjunctivae normal.   Cardiovascular:      Rate and Rhythm: Normal rate and regular rhythm. Pulses: Normal pulses. Heart sounds: Normal heart sounds. Pulmonary:      Effort: Pulmonary effort is normal.      Breath sounds: Normal breath sounds. Abdominal:      General: Abdomen is flat. Bowel sounds are normal. There is no distension. Palpations: Abdomen is soft. Tenderness: There is no abdominal tenderness. Musculoskeletal:      Cervical back: Normal range of motion. Right lower leg: No edema. Left lower leg: No edema. Skin:     General: Skin is warm and dry. Psychiatric:         Mood and Affect: Mood normal.         Behavior: Behavior normal.         Thought Content:  Thought content normal.         Judgment: Judgment normal.     Additional Data:     Labs:    Results from last 7 days   Lab Units 10/08/23  0520   WBC Thousand/uL 9.36   HEMOGLOBIN g/dL 8.6*   HEMATOCRIT % 27.0*   PLATELETS Thousands/uL 264   NEUTROS PCT % 68   LYMPHS PCT % 15   MONOS PCT % 9   EOS PCT % 6     Results from last 7 days   Lab Units 10/08/23  0520 10/07/23  0604   POTASSIUM mmol/L 4.4 4.8   CHLORIDE mmol/L 108 113*   CO2 mmol/L 25 24   BUN mg/dL 38* 38*   CREATININE mg/dL 2.34* 2.25*   CALCIUM mg/dL 8.2* 8.8   ALK PHOS U/L  --  43   ALT U/L  --  4*   AST U/L  --  17     Results from last 7 days   Lab Units 10/06/23  1651   INR  1.28*     Results from last 7 days   Lab Units 10/08/23  1818 10/08/23  1605 10/08/23  1215 10/08/23  1111 10/08/23  0714   POC GLUCOSE mg/dl 124 113 142* 142* 94         * I Have Reviewed All Lab Data Listed Above. * Additional Pertinent Lab Tests Reviewed: All Labs For Current Hospital Admission Reviewed    Imaging:    Imaging Reports Reviewed Today Include: None  Imaging Personally Reviewed by Myself Includes:   All    Recent Cultures (last 7 days):         Last 24 Hours Medication List:     Current Facility-Administered Medications   Medication Dose Route Frequency Provider Last Rate   • acetaminophen  650 mg Per G Tube Q6H PRN Malissa So MD     • aluminum-magnesium hydroxide-simethicone  30 mL Oral Q6H PRN Malissa So MD     • amLODIPine  10 mg Per G Tube Daily Reyna Jarrett MD     • artificial tear   Both Eyes Q4H PRN Malissa So MD     • ascorbic acid  250 mg Per G Tube Daily Malissa So MD     • budesonide  0.25 mg Nebulization BID Malissa So MD     • carbidopa-levodopa  1 tablet Per G Tube TID Malissa So MD     • famotidine  20 mg Per G Tube Every Other Day Malissa So MD     • folic acid  1 mg Per G Tube Daily Malissa So MD     • ipratropium-albuterol  3 mL Nebulization TID Malissa So MD     • labetalol  200 mg Per G Tube [facility] HOME Reyna Jarrett MD     • lactated ringers  75 mL/hr Intravenous Continuous Reyna Jarrett MD 75 mL/hr (10/08/23 1106)   • lamoTRIgine  25 mg Per G Tube BID Malissa So MD     • latanoprost  1 drop Both Eyes HS Malissa So MD     • magnesium hydroxide  30 mL Per G Tube Daily PRN Isra Palacios MD     • melatonin  6 mg Per G Tube HS Isra Palacios MD     • nystatin   Topical TID Isra Palacios MD     • ondansetron  4 mg Intravenous Q6H PRN Isra Palacios MD     • oxybutynin  2.5 mg Per G Tube BID Isra Palacios MD     • oxyCODONE-acetaminophen  1 tablet Oral Once PRN Nurys Garcia MD     • saccharomyces boulardii  250 mg Per PEG Tube BID Isra Palacios MD     • senna  8.8 mg Oral HS Isra Palacios MD     • sertraline  100 mg Per G Tube Daily Isra Palacios MD        ** Please Note: Dictation voice to text software may have been used in the creation of this document.  **    Amilcar Wagner MD  10/08/23  7:14 PM

## 2023-10-08 NOTE — PROGRESS NOTES
Progress Note - Urology      Patient: Laura Flores   : 1940 Sex: male   MRN: [id number]     CSN: [id number]  Unit/Bed#: [address] [place] [address]     SUBJECTIVE:   Patient seen on afternoon rounds  Lieberman catheter removed 10 PM  Voiding into diaper  Postvoid residuals minimal  Patient cleared for discharge path pending      Objective   Vitals: /67 (BP Location: Right arm)   Pulse 75   Temp 98.3 °F (36.8 °C) (Oral)   Resp 20   Ht 5' 11" (1.803 m)   Wt 85.5 kg (188 lb 7.9 oz)   SpO2 100%   BMI 26.29 kg/m²     I/O last 24 hours: In: 2650 [NG/GT:2800;  Feedings:1020]  Out: 1650 [Urine:1650]      Physical Exam:   General confused  Normocephalic atraumatic PERRLA  Lungs clear bilaterally  Cardiac normal S1 normal S2  Abdomen soft, flank pain  Extremities no edema      Lab Results: CBC:   Lab Results   Component Value Date    WBC 9.36 10/08/2023    HGB 8.6 (L) 10/08/2023    HCT 27.0 (L) 10/08/2023    MCV 89 10/08/2023     10/08/2023    RBC 3.05 (L) 10/08/2023    MCH 28.2 10/08/2023    MCHC 31.9 10/08/2023    RDW 16.5 (H) 10/08/2023    MPV 9.2 10/08/2023    NRBC 0 10/08/2023     CMP:   Lab Results   Component Value Date     10/08/2023    CO2 25 10/08/2023    BUN 38 (H) 10/08/2023    CREATININE 2.34 (H) 10/08/2023    CALCIUM 8.2 (L) 10/08/2023    AST 17 10/07/2023    ALT 4 (L) 10/07/2023    ALKPHOS 43 10/07/2023    EGFR 24 10/08/2023     Urinalysis:   Lab Results   Component Value Date    COLORU Red 2023    CLARITYU Cloudy 2023    SPECGRAV 1.010 2023    PHUR 6.5 2023    LEUKOCYTESUR Interference- unable to analyze (A) 2023    NITRITE Interference- unable to analyze 2023    GLUCOSEU Negative 2023    KETONESU Interference- unable to analyze (A) 2023    BILIRUBINUR Interference- unable to analyze (A) 2023    BLOODU Interference- unable to analyze (A) 2023     Urine Culture:   Lab Results   Component Value Date    URINECX 10,000-19,000 cfu/ml Pseudomonas aeruginosa (A) 09/29/2023    URINECX <10,000 cfu/ml Providencia stuartii (A) 09/29/2023    URINECX <10,000 cfu/ml Proteus species (A) 09/29/2023     PSA:   Lab Results   Component Value Date    PSA 5.61 (H) 10/06/2023         Assessment/ Plan:  Gross hematuria  Traumatic Lieberman catheter pulled into bladder neck bladder neck contracture by patient due to worsening dementia  Status post cystoscopy transurethral resection bladder neck contracture transurethral resection bladder tumor fulguration day #3  Patient cleared for discharge  As per medical team  No Lieberman necessary check postvoid residual          Deborah Donovan MD

## 2023-10-09 VITALS
WEIGHT: 190.5 LBS | RESPIRATION RATE: 16 BRPM | SYSTOLIC BLOOD PRESSURE: 115 MMHG | TEMPERATURE: 98.1 F | DIASTOLIC BLOOD PRESSURE: 65 MMHG | HEIGHT: 71 IN | BODY MASS INDEX: 26.67 KG/M2 | HEART RATE: 68 BPM | OXYGEN SATURATION: 97 %

## 2023-10-09 LAB
ANION GAP SERPL CALCULATED.3IONS-SCNC: 8 MMOL/L
BUN SERPL-MCNC: 37 MG/DL (ref 5–25)
CALCIUM SERPL-MCNC: 8.3 MG/DL (ref 8.4–10.2)
CHLORIDE SERPL-SCNC: 109 MMOL/L (ref 96–108)
CO2 SERPL-SCNC: 21 MMOL/L (ref 21–32)
CREAT SERPL-MCNC: 2.2 MG/DL (ref 0.6–1.3)
GFR SERPL CREATININE-BSD FRML MDRD: 26 ML/MIN/1.73SQ M
GLUCOSE SERPL-MCNC: 111 MG/DL (ref 65–140)
GLUCOSE SERPL-MCNC: 122 MG/DL (ref 65–140)
GLUCOSE SERPL-MCNC: 130 MG/DL (ref 65–140)
GLUCOSE SERPL-MCNC: 180 MG/DL (ref 65–140)
GLUCOSE SERPL-MCNC: 99 MG/DL (ref 65–140)
POTASSIUM SERPL-SCNC: 4.8 MMOL/L (ref 3.5–5.3)
SODIUM SERPL-SCNC: 138 MMOL/L (ref 135–147)

## 2023-10-09 PROCEDURE — 94760 N-INVAS EAR/PLS OXIMETRY 1: CPT

## 2023-10-09 PROCEDURE — 94640 AIRWAY INHALATION TREATMENT: CPT

## 2023-10-09 PROCEDURE — 93970 EXTREMITY STUDY: CPT | Performed by: SURGERY

## 2023-10-09 PROCEDURE — 99239 HOSP IP/OBS DSCHRG MGMT >30: CPT | Performed by: INTERNAL MEDICINE

## 2023-10-09 PROCEDURE — 82948 REAGENT STRIP/BLOOD GLUCOSE: CPT

## 2023-10-09 PROCEDURE — 80048 BASIC METABOLIC PNL TOTAL CA: CPT

## 2023-10-09 RX ORDER — AMLODIPINE BESYLATE 10 MG/1
5 TABLET ORAL DAILY
Status: CANCELLED
Start: 2023-10-09

## 2023-10-09 RX ORDER — IPRATROPIUM BROMIDE AND ALBUTEROL SULFATE 2.5; .5 MG/3ML; MG/3ML
3 SOLUTION RESPIRATORY (INHALATION)
Start: 2023-10-09

## 2023-10-09 RX ORDER — LANOLIN ALCOHOL/MO/W.PET/CERES
3 CREAM (GRAM) TOPICAL
Refills: 0
Start: 2023-10-09

## 2023-10-09 RX ORDER — ACETAMINOPHEN 325 MG/1
650 TABLET ORAL EVERY 6 HOURS PRN
Start: 2023-10-09

## 2023-10-09 RX ORDER — SACCHAROMYCES BOULARDII 250 MG
250 CAPSULE ORAL 2 TIMES DAILY
Start: 2023-10-09

## 2023-10-09 RX ORDER — OXYBUTYNIN CHLORIDE 2.5 MG/1
2.5 TABLET ORAL 2 TIMES DAILY
Start: 2023-10-09 | End: 2023-10-12

## 2023-10-09 RX ORDER — SENNOSIDES 8.8 MG/5ML
8.8 LIQUID ORAL
Start: 2023-10-09 | End: 2023-10-21

## 2023-10-09 RX ORDER — SERTRALINE HYDROCHLORIDE 100 MG/1
100 TABLET, FILM COATED ORAL DAILY
Start: 2023-10-10

## 2023-10-09 RX ORDER — FOLIC ACID 1 MG/1
1 TABLET ORAL DAILY
Start: 2023-10-10

## 2023-10-09 RX ORDER — LABETALOL 200 MG/1
200 TABLET, FILM COATED ORAL EVERY 8 HOURS SCHEDULED
Start: 2023-10-09

## 2023-10-09 RX ORDER — FAMOTIDINE 20 MG/1
20 TABLET, FILM COATED ORAL EVERY OTHER DAY
Start: 2023-10-10

## 2023-10-09 RX ORDER — AMLODIPINE BESYLATE 10 MG/1
10 TABLET ORAL DAILY
Start: 2023-10-10

## 2023-10-09 RX ORDER — POLYETHYLENE GLYCOL 3350 17 G/17G
17 POWDER, FOR SOLUTION ORAL DAILY
Qty: 20 EACH | Refills: 1 | Status: SHIPPED | OUTPATIENT
Start: 2023-10-09 | End: 2023-10-21

## 2023-10-09 RX ORDER — ASCORBIC ACID 250 MG
250 TABLET ORAL DAILY
Start: 2023-10-10

## 2023-10-09 RX ORDER — LAMOTRIGINE 25 MG/1
25 TABLET ORAL 2 TIMES DAILY
Start: 2023-10-09

## 2023-10-09 RX ADMIN — Medication 250 MG: at 17:53

## 2023-10-09 RX ADMIN — NYSTATIN 1 APPLICATION: 100000 POWDER TOPICAL at 15:06

## 2023-10-09 RX ADMIN — OXYBUTYNIN CHLORIDE 2.5 MG: 5 TABLET ORAL at 09:34

## 2023-10-09 RX ADMIN — OXYBUTYNIN CHLORIDE 2.5 MG: 5 TABLET ORAL at 17:53

## 2023-10-09 RX ADMIN — CARBIDOPA AND LEVODOPA 1 TABLET: 25; 100 TABLET ORAL at 15:06

## 2023-10-09 RX ADMIN — SERTRALINE HYDROCHLORIDE 100 MG: 100 TABLET ORAL at 09:35

## 2023-10-09 RX ADMIN — Medication 250 MG: at 09:35

## 2023-10-09 RX ADMIN — LAMOTRIGINE 25 MG: 25 TABLET ORAL at 17:53

## 2023-10-09 RX ADMIN — NYSTATIN: 100000 POWDER TOPICAL at 09:36

## 2023-10-09 RX ADMIN — CARBIDOPA AND LEVODOPA 1 TABLET: 25; 100 TABLET ORAL at 09:35

## 2023-10-09 RX ADMIN — IPRATROPIUM BROMIDE AND ALBUTEROL SULFATE 3 ML: 2.5; .5 SOLUTION RESPIRATORY (INHALATION) at 13:20

## 2023-10-09 RX ADMIN — BUDESONIDE 0.25 MG: 0.25 INHALANT ORAL at 07:29

## 2023-10-09 RX ADMIN — FOLIC ACID 1 MG: 1 TABLET ORAL at 09:35

## 2023-10-09 RX ADMIN — AMLODIPINE BESYLATE 10 MG: 10 TABLET ORAL at 09:38

## 2023-10-09 RX ADMIN — LAMOTRIGINE 25 MG: 25 TABLET ORAL at 09:35

## 2023-10-09 RX ADMIN — IPRATROPIUM BROMIDE AND ALBUTEROL SULFATE 3 ML: 2.5; .5 SOLUTION RESPIRATORY (INHALATION) at 07:29

## 2023-10-09 NOTE — CASE MANAGEMENT
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 ([address]) 10/06/2023   • Chronic deep vein thrombosis (DVT) of femoral vein of both lower extremities ([address]) 10/06/2023   • Functional quadriplegia ([address]) 10/02/2023   • Gross hematuria 2023   • Type 2 diabetes mellitus without complication, without long-term current use of insulin ([address]) 2023   • Encounter for general adult medical examination with abnormal findings 2023   • Catheter-associated urinary tract infection  2023   • PD (Parkinson's disease) 2023   • Hypertension 2023   • Chronic kidney disease 2023   • Severe dementia ([address]) 2023   • Dysphagia 2023   • 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[address]) 2023   • Bedbound 2023   • Pressure injury of skin of right hip 2023   • Skin ulcer of sacrum ([address]) 2023   • H/O left mastectomy 2023   • History of prostate cancer 2023   • Arm DVT (deep venous thromboembolism), acute, left ([address]) 2023   • Indwelling Lieberman catheter present 2023   • Chronic obstructive pulmonary disease ([address]) 2023   • Constipation 2023   • Gastroesophageal reflux disease 2023   • Glaucoma of both eyes 2023   • Insomnia 2023   • Fungal rash of torso 2023   • Dry eyes 2023   • Depression 2023   • Benign prostatic hyperplasia 2023      LOS (days): 10  Geometric Mean LOS (GMLOS) (days): 4.90  Days to GMLOS:-4.8     OBJECTIVE:  Risk of Unplanned Readmission Score: 25.05      Current admission status: Inpatient   Preferred Pharmacy:   [facility]/pharmacy #[id number], [address] [place] [address]  Phone: [phone] Fax: [phone]    Primary Care Provider: ROSITA Monae    Primary Insurance: MEDICARE  Secondary Insurance: BLUE CROSS    DISCHARGE DETAILS:    Discharge planning discussed with[de-identified] Daughter, Erik Lover of Choice: Yes  Comments - Freedom of Choice: SW following to assist with DCP. Discharge home is planned today per [facility]. SW placed call to pt's daughter to review plans and IMM. Daughter is aware of plan for pt to return home today. [facility] will be providing home care services and have been made aware of today's discharge. Daughter also spoke with [employer] earlier today about discharge to arrange tube feed delivery. [employer] requested call back to confirm discharge so delivery could be arranged. SW offered to follow up with [employer] for daughter. BLS transport will be needed. SW talked with daughter about pt's level of care to confirm she felt capable of providing the level needed. Offered skilled nursing facility placement as an option. Daughter shared that pt has used all of his nursing facility Medicare coverage. She said she is in the process of applying for Medicaid however pt does have some resources to spend down as well. Daughter shared that some of his needs are new to her but she is willing to learn and care for pt at this time. SW will follow to coordinate services for discharge home.   CM contacted family/caregiver?: Yes  Were Treatment Team discharge recommendations reviewed with patient/caregiver?: Yes  Did patient/caregiver verbalize understanding of patient care needs?: Yes  Were patient/caregiver advised of the risks associated with not following Treatment Team discharge recommendations?: Yes    Contacts  Patient Contacts: Rocio Valiente  Relationship to Patient[de-identified] Family  Contact Method: Phone  Phone Number: [phone]  Reason/Outcome: Discharge [address]         Is the patient interested in [facility] at discharge?: Yes  [facility] requested[de-identified] [facility] Name[de-identified] [facility] External Referral Reason (only applicable if external HHA name selected): Patient has established relationship with provider  [facility] Provider[de-identified] PCP  Home Health Services Needed[de-identified] Evaluate Functional Status and Safety, Urinary Incontinence Catheter Management, Wound/Ostomy Care  Homebound Criteria Met[de-identified] Requires Medical Transportation  Supporting Clincal Findings[de-identified] Bed Bound or Wheelchair Bound, Cognitive Deficit Requiring the Assistance of Others    DME Referral Provided  Referral made for DME?: Yes  DME referral completed for the following items[de-identified] Other ([employer]ore, tube feed)  DME Supplier Name[de-identified] "[employer]    Other Referral/Resources/Interventions Provided:  Interventions: HHC, DME  Referral Comments: [facility] notified of discharge. SOC requested for as soon as possible. Message placed in [facility] regarding discharge and need for tube feed delivery. SW also spoke with Nick Acosta, [facility] Dietician, about today's discharge and need for tube feed delivery. Radha Airam said she would confirm with company and so delivery can be arranged. Treatment Team Recommendation: Short Term Rehab  Discharge Destination Plan[de-identified] [facility]  Dispatcher Contacted: Yes  Number/Name of Dispatcher: [address] [address] [employer] and Unit #): SLETS  ETA of Transport (Date): 10/09/23  ETA of Transport (Time): 1930    IMM Given (Date):: 10/09/23  IMM Given to[de-identified] Family (IMM reviewed with daughter over phone. Daughter verbalized understanding and agrees with discharge determination. SW offered to leave copy in pt's room but daughter said she has copy that was left in room on Friday.  Copy placed in scan bin for chart.)

## 2023-10-09 NOTE — INCIDENTAL FINDINGS
• CT abdomen pelvis wo contrast: Low-lying Lieberman catheter with balloon in the region of the prostatic urethra. Recommend repositioning., No urinary tract calculi, although the base of the urinary bladder is obscured by streak artifact from patient's hip prostheses. No hydronephrosis. , Additional findings as above., The study was marked in [facility] for immediate notification. • CT abdomen pelvis wo contrast: No hydronephrosis, 1.2 cm hyperattenuating focal lesion may be a hemorrhagic cyst versus a solid lesion, stable can be assessed with the MRI performed on nonemergent basis, Markedly limited evaluation of the urinary bladder if needed the urinary bladder can be assessed with the ultrasound.

## 2023-10-09 NOTE — PROGRESS NOTES
Progress Note - Urology      Patient: Raul Sessions   : 1940 Sex: male   MRN: [id number]     CSN: [id number]  Unit/Bed#: [address] [place] [address]     SUBJECTIVE:   Seen on evening rounds  Discharged today  Path from bladder tumor pending      Objective   Vitals: /65   Pulse 68   Temp 98.1 °F (36.7 °C)   Resp 16   Ht 5' 11" (1.803 m)   Wt 86.4 kg (190 lb 8 oz)   SpO2 97%   BMI 26.57 kg/m²     I/O last 24 hours:   In: 1080 [NG/GT:400; Feedings:680]  Out: 1650 [Urine:1650]      Physical Exam:   General Alert awake   Normocephalic atraumatic PERRLA  Lungs clear bilaterally  Cardiac normal S1 normal S2  Abdomen soft, flank pain  Extremities no edema      Lab Results: CBC:   Lab Results   Component Value Date    WBC 9.36 10/08/2023    HGB 8.6 (L) 10/08/2023    HCT 27.0 (L) 10/08/2023    MCV 89 10/08/2023     10/08/2023    RBC 3.05 (L) 10/08/2023    MCH 28.2 10/08/2023    MCHC 31.9 10/08/2023    RDW 16.5 (H) 10/08/2023    MPV 9.2 10/08/2023    NRBC 0 10/08/2023     CMP:   Lab Results   Component Value Date     (H) 10/09/2023    CO2 21 10/09/2023    BUN 37 (H) 10/09/2023    CREATININE 2.20 (H) 10/09/2023    CALCIUM 8.3 (L) 10/09/2023    AST 17 10/07/2023    ALT 4 (L) 10/07/2023    ALKPHOS 43 10/07/2023    EGFR 26 10/09/2023     Urinalysis:   Lab Results   Component Value Date    COLORU Red 2023    CLARITYU Cloudy 2023    SPECGRAV 1.010 2023    PHUR 6.5 2023    LEUKOCYTESUR Interference- unable to analyze (A) 2023    NITRITE Interference- unable to analyze 2023    GLUCOSEU Negative 2023    KETONESU Interference- unable to analyze (A) 2023    BILIRUBINUR Interference- unable to analyze (A) 2023    BLOODU Interference- unable to analyze (A) 2023     Urine Culture:   Lab Results   Component Value Date    URINECX 10,000-19,000 cfu/ml Pseudomonas aeruginosa (A) 2023    URINECX <10,000 cfu/ml Providencia stuartii (A) 2023    Reta Jaimes <10,000 cfu/ml Proteus species (A) 09/29/2023     PSA:   Lab Results   Component Value Date    PSA 5.61 (H) 10/06/2023         Assessment/ Plan:  Gross hematuria  Bladder neck contracture  Bladder tumor  Being adequately  Unable to get to the office for postop visit  The daughter reach out to me to discuss pathology in a few weeks        Annabella Wolf MD

## 2023-10-09 NOTE — SPEECH THERAPY NOTE
Pt alert and family desires additional evaluation/diagnostic tx re: potential for "pleasure feedings" (pt readily accepted a few tsps of puree & no overt s/s aspiration but h/o PD, dysphagia. FTT and PEG tube. Recommend:  Consider MBS/VBS (not completed at [facility]).    Chloé Monge [address] [facility]

## 2023-10-09 NOTE — WOUND OSTOMY CARE
Progress Note - Wound   Alejandra Hyatt 80 y.o. male MRN: [id number]  Unit/Bed#: [address] Encounter: [id number]    Assessment: This is a follow up visit for this 80year old male patient admitted on 9/29/23 with gross hematuria. He has a history of HTN, Parkinson's disease, frequent UTI and gastrostomy tube. He was awake, alert & oriented to person only. Condom catheter was found to be dislodged and patient incontinent of large amounts of urine and stool. Incontinence care given with assist of 2 persons.      Assessment Findings:  1-Healed wounds to bilateral buttocks have now opened and present as stage 3 pressure injury with pink granulation tissue and white slough - orders placed for wound care and for prevention. Please see below for detailed assessments. 2-Stage 3 pressure injury to right hip has healed - orders in place for wound/skin care and for prevention. 3-Gastrostomy peritubular site intact with dried drainage noted - orders in place for site care. 4-Bilateral heels intact - orders in place for skin care and for prevention.     Plan:  Skin care plans:  1-Cleanse sacrobuttock wounds (clustered as 1 wound) with foaming cleanser & pat dry. Apply thin layer of Triad paste daily and prn soilage/dislodgement. Cover with Allevyn bordered foam dressing - change daily & prn soilage/dislodgement. 2-Cleanse right hip healed wound with foaming cleanser & pat dry. Apply thin layer of Triad paste 2x/day and prn soilage/dislodgement. Do not cover with foam dressing. 3-Cleanse gastrostomy peritubular site with gauze, warm water and pat dry. Apply split gauze over bumper and tape with date. Change daily & prn soilage/dislodgement. 4-Hydraguard to bilateral heels BID and PRN  5-Heel lift boots to be worn in bed at all times and after transfer to reclining chair. 6-Ehob pressure redistribution cushion in chair when out of bed.  Limit sitting greater than 1-2 hrs at a time due to stage 3 sacrobuttock pressure injury. 7-Moisturize skin daily with skin nourishing cream.  8-Turn/reposition q2h or when medically stable for pressure re-distribution on skin. Wound 09/29/23 Pressure Injury Buttocks Bilateral;Mid (Active) not present on admission   Wound Image   10/09/23 1118   Wound Description Granulation tissue;Pink;Slough; White 10/09/23 1118   Pressure Injury Stage Stage 3 not POA 10/09/23 1118   Yvonne-wound Assessment Hyperpigmented 10/09/23 1118   Wound Length (cm) 5 cm 10/09/23 1118   Wound Width (cm) 4 cm 10/09/23 1118   Wound Depth (cm) 0.2 cm 10/09/23 1118   Wound Surface Area (cm^2) 20 cm^2 10/09/23 1118   Wound Volume (cm^3) 4 cm^3 10/09/23 1118   Calculated Wound Volume (cm^3) 4 cm^3 10/09/23 1118   Drainage Amount Moderate 10/09/23 1118   Drainage Description Sanguineous 10/09/23 1118   Treatments Cleansed 10/09/23 1118   Dressing Triad paste; Foam, Silicon (eg. Allevyn, etc) 10/09/23 1118   Wound packed? No 10/09/23 1118   Dressing Changed New 10/09/23 1118   Dressing Status Clean;Dry; Intact 10/09/23 1118       Wound 09/29/23 Pressure Injury Hip Right; Outer (Active)   Wound Image   10/09/23 1126   Wound Description Granulation tissue;Pink;Epithelialization 10/09/23 1126   Pressure Injury Stage Healed stage 3  10/09/23 1126   Yvonne-wound Assessment Hyperpigmented 10/09/23 1126   Wound Length (cm) 0 cm 10/09/23 1126   Wound Width (cm) 0 cm 10/09/23 1126   Wound Depth (cm) 0 cm 10/09/23 1126   Wound Surface Area (cm^2) 0 cm^2 10/09/23 1126   Wound Volume (cm^3) 0 cm^3 10/09/23 1126   Calculated Wound Volume (cm^3) 0 cm^3 10/09/23 1126   Change in Wound Size % 100 10/09/23 1126   Drainage Amount None 10/09/23 1126   Treatments Cleansed 10/09/23 1126   Dressing Triad paste 10/09/23 1126   Wound packed? No 10/09/23 1126   Dressing Changed New 10/09/23 1126   Dressing Status Intact 10/09/23 1126     Discussed assessment findings, and plan of care/recommendations with Dr Emeterio Liao RN.     Patient is for discharge today, please call or tiger text with questions and concerns. Recommendations written as orders.   Sarbjit QUIJANO, RN, [employer]

## 2023-10-09 NOTE — NURSING NOTE
Spoke with Family medicine residency team as pt feeding pump did not arrive. Okay for family to resume bolus feed in am as before until pump arrives.

## 2023-10-09 NOTE — PLAN OF CARE
Problem: Potential for Falls  Goal: Patient will remain free of falls  Description: INTERVENTIONS:  - Educate patient/family on patient safety including physical limitations  - Instruct patient to call for assistance with activity   - Consult OT/PT to assist with strengthening/mobility   - Keep Call bell within reach  - Keep bed low and locked with side rails adjusted as appropriate  - Keep care items and personal belongings within reach  - Initiate and maintain comfort rounds  - Make Fall Risk Sign visible to staff  - Offer Toileting every 2 Hours, in advance of need  - Initiate/Maintain bed alarm    - Apply yellow socks and bracelet for high fall risk patients  - Consider moving patient to room near nurses station  Outcome: Progressing     Problem: GENITOURINARY - ADULT  Goal: Maintains or returns to baseline urinary function  Description: INTERVENTIONS:  - Assess urinary function  - Encourage oral fluids to ensure adequate hydration if ordered  - Administer IV fluids as ordered to ensure adequate hydration  - Administer ordered medications as needed  - Offer frequent toileting  - Follow urinary retention protocol if ordered  Outcome: Progressing  Goal: Absence of urinary retention  Description: INTERVENTIONS:  - Assess patient’s ability to void and empty bladder  - Monitor I/O  - Bladder scan as needed  - Discuss with physician/AP medications to alleviate retention as needed  - Discuss catheterization for long term situations as appropriate  Outcome: Progressing     Problem: SKIN/TISSUE INTEGRITY - ADULT  Goal: Incision(s), wounds(s) or drain site(s) healing without S/S of infection  Description: INTERVENTIONS  - Assess and document dressing, incision, wound bed, drain sites and surrounding tissue  - Provide patient and family education  - Perform skin care/dressing changes every shift and PRN  Outcome: Progressing  Goal: Pressure injury heals and does not worsen  Description: Interventions:  - Implement low air loss mattress or specialty surface (Criteria met)  - Apply silicone foam dressing  - Instruct/assist with weight shifting every 15-30 minutes when in chair   - Limit chair time to 2 hour intervals  - Use special pressure reducing interventions such as cushion when in chair   - Apply fecal or urinary incontinence containment device   - Perform passive or active ROM every 2 h  - Turn and reposition patient & offload bony prominences every 2 hours   - Utilize friction reducing device or surface for transfers   - Consider consults to  interdisciplinary teams such as  wound nurse  - Use incontinent care products after each incontinent episode such as foam cleanser  - Consider nutrition services referral as needed  Outcome: Progressing     Problem: SAFETY ADULT  Goal: Patient will remain free of falls  Description: INTERVENTIONS:  - Educate patient/family on patient safety including physical limitations  - Instruct patient to call for assistance with activity   - Consult OT/PT to assist with strengthening/mobility   - Keep Call bell within reach  - Keep bed low and locked with side rails adjusted as appropriate  - Keep care items and personal belongings within reach  - Initiate and maintain comfort rounds  - Make Fall Risk Sign visible to staff  - Offer Toileting every 2 Hours, in advance of need  - Initiate/Maintain bed alarm    - Apply yellow socks and bracelet for high fall risk patients  - Consider moving patient to room near nurses station  Outcome: Progressing     Problem: SAFETY,RESTRAINT: NV/NON-SELF DESTRUCTIVE BEHAVIOR  Goal: Remains free of harm/injury (restraint for non violent/non self-detsructive behavior)  Description: INTERVENTIONS:  - Instruct patient/family regarding restraint use   - Assess and monitor physiologic and psychological status   - Provide interventions and comfort measures to meet assessed patient needs   - Identify and implement measures to help patient regain control  - Assess readiness for release of restraint   Outcome: Progressing  Goal: Returns to optimal restraint-free functioning  Description: INTERVENTIONS:  - Assess the patient's behavior and symptoms that indicate continued need for restraint  - Identify and implement measures to help patient regain control  - Assess readiness for release of restraint   Outcome: Progressing

## 2023-10-09 NOTE — QUICK NOTE
Discharge Teaching Statement    I have reviewed the discharge note performed and documented by the Resident's. I personally performed the required components/examined the patient on 10/9/2023. I agree with the Resident's findings and plan of care with the following additions/exceptions:    Comfortably lying in bed. Appears to be at baseline, denies any pain. Lieberman catheter was removed and voiding without difficulties. PVR remains less than 50 cc  Afebrile vital signs stable saturating room air  Elderly, frail, HEENT-moist mucosa. Cardiac S1-S2 normal.  Chest clear to auscultation. Abdomen soft nontender G-tube in place. Lieberman catheter with clear yellow urine. Extremity no edema    Creatinine remains close to baseline at 2.2. Sodium level stable. Normal white count, hemoglobin stable. · Completed IV Zosyn for complicated polymicrobial UTI associated with indwelling Lieberman catheter  · Monitor urine output  · Continue current tube feeding with water flushes 200 cc every 4 hours  · Follow-up biopsy results  · Follow-up with urology as outpatient  · Currently holding anticoagulation for DVT due to recurrent hematuria. , resume anticoagulation with Eliquis as outpatient. Patient is status post IVC filter. Reviewed risk versus benefits with the family.   · Follow-up outpatient for urology for focal renal lesion  · Monitor CBC, BMP as outpatient  · Continue labetalol, decrease amlodipine to 5 mg daily  · Continue other home meds, probiotics  · Bowel regimen, avoid constipation  · Daughter prefers to have patient home  · Continue wound care  · Case management input appreciated for appropriate DMEs, services and supplies arrangements    Brian Reina MD

## 2023-10-10 ENCOUNTER — TELEPHONE (OUTPATIENT)
Dept: FAMILY MEDICINE CLINIC | Facility: CLINIC | Age: 83
End: 2023-10-10

## 2023-10-10 NOTE — TELEPHONE ENCOUNTER
pts daughter, Kevin Adrian, called requesting a home visit as pt was recently in hospital. She would like a visit + blood work. Kevin Adrian can be reached at [phone] to set up an appt.

## 2023-10-10 NOTE — TELEPHONE ENCOUNTER
ghada on clinical line:     Hi, my name is Jj Canales. I'm a registered nurse with the [facility]. I am calling in regard to Tuesdays patient Mikal Crow. His YOB: 1940. I'm here today to start of care visits. He will be receiving home care visits from the [address] [place] [address]. I did want to let Tuesday know he does need a prescription for glucometer. He does not have one. And the other thing is that the G tube feeding pump does not have a part that connects it from the tubing to the patients PEG. I called the company and it has to be ordered by the provider. The company is [employer]. The number is [phone]. The part I was told is . My name is Jj Canales. You can call me at [phone]. Thank you. Tuesday- can you assist in ordering glucometer and the part VNA is taking about for feeding tube?

## 2023-10-11 ENCOUNTER — TELEPHONE (OUTPATIENT)
Dept: FAMILY MEDICINE CLINIC | Facility: CLINIC | Age: 83
End: 2023-10-11

## 2023-10-11 ENCOUNTER — PATIENT OUTREACH (OUTPATIENT)
Dept: CASE MANAGEMENT | Facility: HOSPITAL | Age: 83
End: 2023-10-11

## 2023-10-11 DIAGNOSIS — I82.622 ARM DVT (DEEP VENOUS THROMBOEMBOLISM), ACUTE, LEFT (HCC): Primary | ICD-10-CM

## 2023-10-11 DIAGNOSIS — I82.4Z9 DEEP VEIN THROMBOSIS (DVT) OF DISTAL VEIN OF LOWER EXTREMITY, UNSPECIFIED CHRONICITY, UNSPECIFIED LATERALITY (HCC): ICD-10-CM

## 2023-10-11 NOTE — TELEPHONE ENCOUNTER
Medical Source Capability Form   Form Scanned into encounter   Made copies   Copies placed in to be scanned   Form mailed out

## 2023-10-11 NOTE — PROGRESS NOTES
ADT received and chart review performed. Pt hospitalized 9/29-10/9 with hematuria. Completed abx and passed urine trial. Discharged home with [address] [facility]. Spoke with daughter Rodolfo Yuen called for follow up care management. She states he is doing ok. However she has been doing bolus TF because she does not have the connector for TF line. Also states that he is having high residual. She states that there was a nurse visit yesterday to do intake and they will be seeing her dad three times per week however she does not have a next appointment set up. Rodolfo Yuen is requesting a call from Tuesday. Pt is bed bound and seen by her. Was seen by urology in hospital however office visit not warranted at this time. Stated I will write note and route to Tuesday. Rodolfo Yuen also states that she needs a glucometer ordered to check his sugars. Note routed to Tuesday Monica BECKER, and Luzma Marrero RN CM on care team for follow up care management.

## 2023-10-12 ENCOUNTER — IN HOME VISIT (OUTPATIENT)
Dept: FAMILY MEDICINE CLINIC | Facility: CLINIC | Age: 83
End: 2023-10-12
Payer: MEDICARE

## 2023-10-12 DIAGNOSIS — E11.9 TYPE 2 DIABETES MELLITUS WITHOUT COMPLICATION, WITHOUT LONG-TERM CURRENT USE OF INSULIN (HCC): ICD-10-CM

## 2023-10-12 DIAGNOSIS — I82.622 ARM DVT (DEEP VENOUS THROMBOEMBOLISM), ACUTE, LEFT (HCC): ICD-10-CM

## 2023-10-12 DIAGNOSIS — R33.9 URINARY RETENTION: Primary | ICD-10-CM

## 2023-10-12 DIAGNOSIS — K64.9 HEMORRHOIDS, UNSPECIFIED HEMORRHOID TYPE: ICD-10-CM

## 2023-10-12 DIAGNOSIS — B36.9 FUNGAL RASH OF TORSO: ICD-10-CM

## 2023-10-12 DIAGNOSIS — G47.00 INSOMNIA, UNSPECIFIED TYPE: ICD-10-CM

## 2023-10-12 PROBLEM — C50.122 MALIGNANT NEOPLASM OF CENTRAL PORTION OF LEFT MALE BREAST (HCC): Status: ACTIVE | Noted: 2023-07-04

## 2023-10-12 PROBLEM — N39.0 UTI (URINARY TRACT INFECTION) DUE TO URINARY INDWELLING FOLEY CATHETER: Status: ACTIVE | Noted: 2023-07-04

## 2023-10-12 PROBLEM — G93.41 ACUTE METABOLIC ENCEPHALOPATHY: Status: ACTIVE | Noted: 2023-07-04

## 2023-10-12 PROBLEM — R78.81 GRAM-POSITIVE BACTEREMIA: Status: ACTIVE | Noted: 2023-07-09

## 2023-10-12 PROBLEM — E87.0 HYPERNATREMIA: Status: ACTIVE | Noted: 2023-07-04

## 2023-10-12 PROBLEM — T83.511A UTI (URINARY TRACT INFECTION) DUE TO URINARY INDWELLING FOLEY CATHETER: Status: ACTIVE | Noted: 2023-07-04

## 2023-10-12 LAB

## 2023-10-12 PROCEDURE — 99349 HOME/RES VST EST MOD MDM 40: CPT | Performed by: NURSE PRACTITIONER

## 2023-10-12 PROCEDURE — 88305 TISSUE EXAM BY PATHOLOGIST: CPT | Performed by: PATHOLOGY

## 2023-10-12 PROCEDURE — 88341 IMHCHEM/IMCYTCHM EA ADD ANTB: CPT | Performed by: PATHOLOGY

## 2023-10-12 PROCEDURE — 88342 IMHCHEM/IMCYTCHM 1ST ANTB: CPT | Performed by: PATHOLOGY

## 2023-10-12 PROCEDURE — 88307 TISSUE EXAM BY PATHOLOGIST: CPT | Performed by: PATHOLOGY

## 2023-10-12 RX ORDER — NYSTATIN 100000 [USP'U]/G
POWDER TOPICAL 3 TIMES DAILY
Qty: 60 G | Refills: 3 | Status: SHIPPED | OUTPATIENT
Start: 2023-10-12 | End: 2023-11-11

## 2023-10-12 RX ORDER — HYDROCORTISONE 25 MG/G
CREAM TOPICAL 2 TIMES DAILY
Qty: 28 G | Refills: 2 | Status: SHIPPED | OUTPATIENT
Start: 2023-10-12 | End: 2023-10-26

## 2023-10-12 RX ORDER — TAMSULOSIN HYDROCHLORIDE 0.4 MG/1
0.4 CAPSULE ORAL
Qty: 30 CAPSULE | Refills: 3 | Status: SHIPPED | OUTPATIENT
Start: 2023-10-12 | End: 2023-11-11

## 2023-10-12 RX ORDER — MIRTAZAPINE 7.5 MG/1
7.5 TABLET, FILM COATED ORAL
Qty: 30 TABLET | Refills: 5 | Status: SHIPPED | OUTPATIENT
Start: 2023-10-12

## 2023-10-12 NOTE — PROGRESS NOTES
Assessment/Plan:       Diagnoses and all orders for this visit:    Urinary retention  -     tamsulosin (FLOMAX) 0.4 mg; Take 1 capsule (0.4 mg total) by mouth daily with dinner  -     No urine output-notify me or VN after 8 hours    Fungal rash of torso  -     nystatin (MYCOSTATIN) powder; Apply topically 3 (three) times a day    Insomnia, unspecified type  -     mirtazapine (REMERON) 7.5 MG tablet; Take 1 tablet (7.5 mg total) by mouth daily at bedtime    Hemorrhoids, unspecified hemorrhoid type  -     hydrocortisone (ANUSOL-HC) 2.5 % rectal cream; Apply topically 2 (two) times a day for 14 days    Type 2 diabetes mellitus without complication, without long-term current use of insulin ([facility])  -     One Touch Verio IQ    Arm DVT (deep venous thromboembolism), acute, left ([address])         -      eliquis as explained to daughter    Subjective:      Patient ID: Tamiko Gilmore is a 80 y.o. male. Patient is bed bound. Seen at home today for follow up from hospital NH. He was hospitalized for hematuria in presence of catheter. He had multiple procedures done and returned home without catheter. He has had mediocre urine output per daughter in form of urinary incontinence. We reviewed his medications, tube feeding, wounds and he is to have repeat blood drawn next week for cbc, and cbc. He is suffering from insomnia. He is bed bound at present time. He is able to answer question with short answers. He has hemorrhoids, a groin fungal rash, insomnia and possible BPH.  > 40 minutes was spent with daughter and patient on detailed history and physical exam and assessment, planning and diagnosing and education. Clarified with Dr. Sue Angel he is to start blood thinner for 7 days at 5 mg bid the 2.5mg  for 30 days biid for UE DVT. Daughter educated to start medication on the date noted.           The following portions of the patient's history were reviewed and updated as appropriate: allergies, current medications, past family history, past medical history, past social history, past surgical history, and problem list.    Review of Systems   Constitutional: Negative. HENT: Negative. Eyes: Negative. Respiratory: Negative. Cardiovascular: Negative. Gastrointestinal: Negative. Endocrine: Negative. Genitourinary: Negative. Musculoskeletal: Negative. Skin: Negative. Allergic/Immunologic: Negative. Neurological: Negative. Hematological: Negative. Psychiatric/Behavioral: Negative. Objective:      /60   Pulse 58   Temp (!) 97.4 °F (36.3 °C)   Resp 18   SpO2 97%          Physical Exam  Constitutional:       General: He is not in acute distress. Appearance: Normal appearance. He is not ill-appearing, toxic-appearing or diaphoretic. Comments: Laying in bed   HENT:      Head: Normocephalic and atraumatic. Right Ear: External ear normal.      Left Ear: External ear normal.      Nose: Nose normal. No congestion. Mouth/Throat:      Mouth: Mucous membranes are moist.      Pharynx: Oropharynx is clear. Eyes:      General:         Right eye: No discharge. Left eye: No discharge. Conjunctiva/sclera: Conjunctivae normal.   Cardiovascular:      Rate and Rhythm: Normal rate and regular rhythm. Pulses: Normal pulses. Heart sounds: No murmur heard. Pulmonary:      Effort: Pulmonary effort is normal. No respiratory distress. Breath sounds: Normal breath sounds. No stridor. No wheezing, rhonchi or rales. Abdominal:      General: Bowel sounds are normal.      Palpations: Abdomen is soft. Tenderness: There is no abdominal tenderness. There is no guarding. Musculoskeletal:      Cervical back: Normal range of motion. No rigidity. Right lower leg: No edema. Left lower leg: No edema. Comments: No ease in ROM   Skin:     General: Skin is warm and dry. Findings: Lesion present.       Comments: Right hip pressure ulcer covered Neurological:      Mental Status: He is alert. Motor: Weakness present. Comments: Oriented to person  Bed bound   Psychiatric:         Mood and Affect: Mood normal.         Behavior: Behavior normal.         Thought Content:  Thought content normal.         Judgment: Judgment normal.

## 2023-10-16 ENCOUNTER — APPOINTMENT (EMERGENCY)
Dept: RADIOLOGY | Facility: HOSPITAL | Age: 83
DRG: 689 | End: 2023-10-16
Payer: MEDICARE

## 2023-10-16 ENCOUNTER — HOSPITAL ENCOUNTER (INPATIENT)
Facility: HOSPITAL | Age: 83
LOS: 5 days | Discharge: HOME HEALTH | DRG: 689 | End: 2023-10-21
Attending: EMERGENCY MEDICINE | Admitting: FAMILY MEDICINE
Payer: MEDICARE

## 2023-10-16 VITALS
SYSTOLIC BLOOD PRESSURE: 120 MMHG | OXYGEN SATURATION: 97 % | RESPIRATION RATE: 18 BRPM | DIASTOLIC BLOOD PRESSURE: 60 MMHG | TEMPERATURE: 97.4 F | HEART RATE: 58 BPM

## 2023-10-16 DIAGNOSIS — N39.0 CHRONIC UTI: ICD-10-CM

## 2023-10-16 DIAGNOSIS — R53.1 GENERALIZED WEAKNESS: ICD-10-CM

## 2023-10-16 DIAGNOSIS — Z97.8 INDWELLING FOLEY CATHETER PRESENT: ICD-10-CM

## 2023-10-16 DIAGNOSIS — R53.2 FUNCTIONAL QUADRIPLEGIA (HCC): ICD-10-CM

## 2023-10-16 DIAGNOSIS — R07.9 CHEST PAIN: Primary | ICD-10-CM

## 2023-10-16 DIAGNOSIS — E83.42 HYPOMAGNESEMIA: ICD-10-CM

## 2023-10-16 DIAGNOSIS — N18.9 CKD (CHRONIC KIDNEY DISEASE): ICD-10-CM

## 2023-10-16 DIAGNOSIS — R10.9 ABDOMINAL PAIN: ICD-10-CM

## 2023-10-16 DIAGNOSIS — K59.00 CONSTIPATION, UNSPECIFIED CONSTIPATION TYPE: ICD-10-CM

## 2023-10-16 DIAGNOSIS — E86.0 DEHYDRATION: ICD-10-CM

## 2023-10-16 DIAGNOSIS — N39.0 UTI (URINARY TRACT INFECTION): ICD-10-CM

## 2023-10-16 PROBLEM — K64.9 HEMORRHOIDS: Status: ACTIVE | Noted: 2023-10-16

## 2023-10-16 PROBLEM — N17.9 ACUTE RENAL INJURY (HCC): Status: ACTIVE | Noted: 2023-10-16

## 2023-10-16 PROBLEM — R33.9 URINARY RETENTION: Status: ACTIVE | Noted: 2023-10-16

## 2023-10-16 LAB
2HR DELTA HS TROPONIN: -3 NG/L
4HR DELTA HS TROPONIN: -3 NG/L
ALBUMIN SERPL BCP-MCNC: 3.2 G/DL (ref 3.5–5)
ALP SERPL-CCNC: 45 U/L (ref 34–104)
ALT SERPL W P-5'-P-CCNC: 3 U/L (ref 7–52)
ANION GAP SERPL CALCULATED.3IONS-SCNC: 9 MMOL/L
AST SERPL W P-5'-P-CCNC: 18 U/L (ref 13–39)
ATRIAL RATE: 69 BPM
BACTERIA UR QL AUTO: ABNORMAL /HPF
BASOPHILS # BLD AUTO: 0.06 THOUSANDS/ÂΜL (ref 0–0.1)
BASOPHILS NFR BLD AUTO: 1 % (ref 0–1)
BILIRUB SERPL-MCNC: 0.45 MG/DL (ref 0.2–1)
BILIRUB UR QL STRIP: ABNORMAL
BUN SERPL-MCNC: 32 MG/DL (ref 5–25)
CALCIUM ALBUM COR SERPL-MCNC: 9.6 MG/DL (ref 8.3–10.1)
CALCIUM SERPL-MCNC: 9 MG/DL (ref 8.4–10.2)
CARDIAC TROPONIN I PNL SERPL HS: 18 NG/L
CARDIAC TROPONIN I PNL SERPL HS: 18 NG/L
CARDIAC TROPONIN I PNL SERPL HS: 21 NG/L
CHLORIDE SERPL-SCNC: 105 MMOL/L (ref 96–108)
CLARITY UR: ABNORMAL
CO2 SERPL-SCNC: 24 MMOL/L (ref 21–32)
COLOR UR: ABNORMAL
CREAT SERPL-MCNC: 2.34 MG/DL (ref 0.6–1.3)
EOSINOPHIL # BLD AUTO: 0.28 THOUSAND/ÂΜL (ref 0–0.61)
EOSINOPHIL NFR BLD AUTO: 4 % (ref 0–6)
ERYTHROCYTE [DISTWIDTH] IN BLOOD BY AUTOMATED COUNT: 16.7 % (ref 11.6–15.1)
FLUAV RNA RESP QL NAA+PROBE: NEGATIVE
FLUBV RNA RESP QL NAA+PROBE: NEGATIVE
GFR SERPL CREATININE-BSD FRML MDRD: 24 ML/MIN/1.73SQ M
GLUCOSE SERPL-MCNC: 111 MG/DL (ref 65–140)
GLUCOSE UR STRIP-MCNC: NEGATIVE MG/DL
HCT VFR BLD AUTO: 32 % (ref 36.5–49.3)
HGB BLD-MCNC: 10.3 G/DL (ref 12–17)
HGB UR QL STRIP.AUTO: ABNORMAL
HYALINE CASTS #/AREA URNS LPF: ABNORMAL /LPF
IMM GRANULOCYTES # BLD AUTO: 0.03 THOUSAND/UL (ref 0–0.2)
IMM GRANULOCYTES NFR BLD AUTO: 0 % (ref 0–2)
KETONES UR STRIP-MCNC: ABNORMAL MG/DL
LACTATE SERPL-SCNC: 1.6 MMOL/L (ref 0.5–2)
LACTATE SERPL-SCNC: 2.1 MMOL/L (ref 0.5–2)
LEUKOCYTE ESTERASE UR QL STRIP: ABNORMAL
LIPASE SERPL-CCNC: 34 U/L (ref 11–82)
LYMPHOCYTES # BLD AUTO: 1.83 THOUSANDS/ÂΜL (ref 0.6–4.47)
LYMPHOCYTES NFR BLD AUTO: 26 % (ref 14–44)
MAGNESIUM SERPL-MCNC: 1.8 MG/DL (ref 1.9–2.7)
MCH RBC QN AUTO: 27.5 PG (ref 26.8–34.3)
MCHC RBC AUTO-ENTMCNC: 32.2 G/DL (ref 31.4–37.4)
MCV RBC AUTO: 85 FL (ref 82–98)
MONOCYTES # BLD AUTO: 0.51 THOUSAND/ÂΜL (ref 0.17–1.22)
MONOCYTES NFR BLD AUTO: 7 % (ref 4–12)
NEUTROPHILS # BLD AUTO: 4.33 THOUSANDS/ÂΜL (ref 1.85–7.62)
NEUTS SEG NFR BLD AUTO: 62 % (ref 43–75)
NITRITE UR QL STRIP: NEGATIVE
NON-SQ EPI CELLS URNS QL MICRO: ABNORMAL /HPF
NRBC BLD AUTO-RTO: 0 /100 WBCS
PH UR STRIP.AUTO: 5.5 [PH]
PLATELET # BLD AUTO: 334 THOUSANDS/UL (ref 149–390)
PMV BLD AUTO: 8.8 FL (ref 8.9–12.7)
POTASSIUM SERPL-SCNC: 4.4 MMOL/L (ref 3.5–5.3)
PR INTERVAL: 210 MS
PROCALCITONIN SERPL-MCNC: 0.09 NG/ML
PROT SERPL-MCNC: 7.8 G/DL (ref 6.4–8.4)
PROT UR STRIP-MCNC: ABNORMAL MG/DL
QRS AXIS: 23 DEGREES
QRSD INTERVAL: 92 MS
QT INTERVAL: 384 MS
QTC INTERVAL: 411 MS
RBC # BLD AUTO: 3.75 MILLION/UL (ref 3.88–5.62)
RBC #/AREA URNS AUTO: ABNORMAL /HPF
RSV RNA RESP QL NAA+PROBE: NEGATIVE
SARS-COV-2 RNA RESP QL NAA+PROBE: NEGATIVE
SODIUM SERPL-SCNC: 138 MMOL/L (ref 135–147)
SP GR UR STRIP.AUTO: 1.02 (ref 1–1.03)
T WAVE AXIS: 69 DEGREES
UROBILINOGEN UR QL STRIP.AUTO: 0.2 E.U./DL
VENTRICULAR RATE: 69 BPM
WBC # BLD AUTO: 7.04 THOUSAND/UL (ref 4.31–10.16)
WBC #/AREA URNS AUTO: ABNORMAL /HPF

## 2023-10-16 PROCEDURE — 87086 URINE CULTURE/COLONY COUNT: CPT | Performed by: EMERGENCY MEDICINE

## 2023-10-16 PROCEDURE — 94640 AIRWAY INHALATION TREATMENT: CPT

## 2023-10-16 PROCEDURE — 84145 PROCALCITONIN (PCT): CPT | Performed by: STUDENT IN AN ORGANIZED HEALTH CARE EDUCATION/TRAINING PROGRAM

## 2023-10-16 PROCEDURE — 99285 EMERGENCY DEPT VISIT HI MDM: CPT

## 2023-10-16 PROCEDURE — 74176 CT ABD & PELVIS W/O CONTRAST: CPT

## 2023-10-16 PROCEDURE — 80053 COMPREHEN METABOLIC PANEL: CPT | Performed by: EMERGENCY MEDICINE

## 2023-10-16 PROCEDURE — 81001 URINALYSIS AUTO W/SCOPE: CPT | Performed by: EMERGENCY MEDICINE

## 2023-10-16 PROCEDURE — 0241U HB NFCT DS VIR RESP RNA 4 TRGT: CPT | Performed by: STUDENT IN AN ORGANIZED HEALTH CARE EDUCATION/TRAINING PROGRAM

## 2023-10-16 PROCEDURE — 36415 COLL VENOUS BLD VENIPUNCTURE: CPT | Performed by: EMERGENCY MEDICINE

## 2023-10-16 PROCEDURE — G1004 CDSM NDSC: HCPCS

## 2023-10-16 PROCEDURE — 71250 CT THORAX DX C-: CPT

## 2023-10-16 PROCEDURE — 96361 HYDRATE IV INFUSION ADD-ON: CPT

## 2023-10-16 PROCEDURE — 96375 TX/PRO/DX INJ NEW DRUG ADDON: CPT

## 2023-10-16 PROCEDURE — 83735 ASSAY OF MAGNESIUM: CPT | Performed by: EMERGENCY MEDICINE

## 2023-10-16 PROCEDURE — 87186 SC STD MICRODIL/AGAR DIL: CPT | Performed by: EMERGENCY MEDICINE

## 2023-10-16 PROCEDURE — 83605 ASSAY OF LACTIC ACID: CPT | Performed by: EMERGENCY MEDICINE

## 2023-10-16 PROCEDURE — 94760 N-INVAS EAR/PLS OXIMETRY 1: CPT

## 2023-10-16 PROCEDURE — 87077 CULTURE AEROBIC IDENTIFY: CPT | Performed by: EMERGENCY MEDICINE

## 2023-10-16 PROCEDURE — 96374 THER/PROPH/DIAG INJ IV PUSH: CPT

## 2023-10-16 PROCEDURE — 83690 ASSAY OF LIPASE: CPT | Performed by: EMERGENCY MEDICINE

## 2023-10-16 PROCEDURE — 85025 COMPLETE CBC W/AUTO DIFF WBC: CPT | Performed by: EMERGENCY MEDICINE

## 2023-10-16 PROCEDURE — 84484 ASSAY OF TROPONIN QUANT: CPT | Performed by: EMERGENCY MEDICINE

## 2023-10-16 PROCEDURE — 99285 EMERGENCY DEPT VISIT HI MDM: CPT | Performed by: EMERGENCY MEDICINE

## 2023-10-16 PROCEDURE — 93005 ELECTROCARDIOGRAM TRACING: CPT

## 2023-10-16 PROCEDURE — 99222 1ST HOSP IP/OBS MODERATE 55: CPT | Performed by: STUDENT IN AN ORGANIZED HEALTH CARE EDUCATION/TRAINING PROGRAM

## 2023-10-16 PROCEDURE — 93010 ELECTROCARDIOGRAM REPORT: CPT | Performed by: INTERNAL MEDICINE

## 2023-10-16 RX ORDER — SERTRALINE HYDROCHLORIDE 100 MG/1
100 TABLET, FILM COATED ORAL DAILY
Status: DISCONTINUED | OUTPATIENT
Start: 2023-10-17 | End: 2023-10-21 | Stop reason: HOSPADM

## 2023-10-16 RX ORDER — LATANOPROST 50 UG/ML
1 SOLUTION/ DROPS OPHTHALMIC
Status: DISCONTINUED | OUTPATIENT
Start: 2023-10-16 | End: 2023-10-21 | Stop reason: HOSPADM

## 2023-10-16 RX ORDER — MULTIVIT WITH MINERALS/LUTEIN
250 TABLET ORAL DAILY
Status: DISCONTINUED | OUTPATIENT
Start: 2023-10-17 | End: 2023-10-21 | Stop reason: HOSPADM

## 2023-10-16 RX ORDER — LABETALOL 100 MG/1
200 TABLET, FILM COATED ORAL EVERY 8 HOURS SCHEDULED
Status: DISCONTINUED | OUTPATIENT
Start: 2023-10-16 | End: 2023-10-21 | Stop reason: HOSPADM

## 2023-10-16 RX ORDER — POLYETHYLENE GLYCOL 3350 17 G/17G
17 POWDER, FOR SOLUTION ORAL DAILY
Status: DISCONTINUED | OUTPATIENT
Start: 2023-10-17 | End: 2023-10-19

## 2023-10-16 RX ORDER — MIRTAZAPINE 15 MG/1
7.5 TABLET, FILM COATED ORAL
Status: DISCONTINUED | OUTPATIENT
Start: 2023-10-16 | End: 2023-10-17

## 2023-10-16 RX ORDER — SACCHAROMYCES BOULARDII 250 MG
250 CAPSULE ORAL 2 TIMES DAILY
Status: DISCONTINUED | OUTPATIENT
Start: 2023-10-16 | End: 2023-10-21 | Stop reason: HOSPADM

## 2023-10-16 RX ORDER — IPRATROPIUM BROMIDE AND ALBUTEROL SULFATE 2.5; .5 MG/3ML; MG/3ML
3 SOLUTION RESPIRATORY (INHALATION)
Status: DISCONTINUED | OUTPATIENT
Start: 2023-10-16 | End: 2023-10-21 | Stop reason: HOSPADM

## 2023-10-16 RX ORDER — BUDESONIDE 0.25 MG/2ML
0.25 INHALANT ORAL 2 TIMES DAILY
Status: DISCONTINUED | OUTPATIENT
Start: 2023-10-16 | End: 2023-10-21 | Stop reason: HOSPADM

## 2023-10-16 RX ORDER — NYSTATIN 100000 [USP'U]/G
POWDER TOPICAL 3 TIMES DAILY
Status: DISCONTINUED | OUTPATIENT
Start: 2023-10-16 | End: 2023-10-21 | Stop reason: HOSPADM

## 2023-10-16 RX ORDER — TAMSULOSIN HYDROCHLORIDE 0.4 MG/1
0.4 CAPSULE ORAL
Status: DISCONTINUED | OUTPATIENT
Start: 2023-10-16 | End: 2023-10-17

## 2023-10-16 RX ORDER — FOLIC ACID 1 MG/1
1 TABLET ORAL DAILY
Status: DISCONTINUED | OUTPATIENT
Start: 2023-10-17 | End: 2023-10-21 | Stop reason: HOSPADM

## 2023-10-16 RX ORDER — AMLODIPINE BESYLATE 10 MG/1
10 TABLET ORAL DAILY
Status: DISCONTINUED | OUTPATIENT
Start: 2023-10-17 | End: 2023-10-21 | Stop reason: HOSPADM

## 2023-10-16 RX ORDER — HYDROMORPHONE HCL/PF 1 MG/ML
0.5 SYRINGE (ML) INJECTION ONCE
Status: COMPLETED | OUTPATIENT
Start: 2023-10-16 | End: 2023-10-16

## 2023-10-16 RX ORDER — SENNOSIDES 8.8 MG/5ML
8.8 LIQUID ORAL
Status: DISCONTINUED | OUTPATIENT
Start: 2023-10-16 | End: 2023-10-19

## 2023-10-16 RX ORDER — LAMOTRIGINE 25 MG/1
25 TABLET ORAL 2 TIMES DAILY
Status: DISCONTINUED | OUTPATIENT
Start: 2023-10-16 | End: 2023-10-21 | Stop reason: HOSPADM

## 2023-10-16 RX ORDER — ONDANSETRON 2 MG/ML
4 INJECTION INTRAMUSCULAR; INTRAVENOUS ONCE
Status: COMPLETED | OUTPATIENT
Start: 2023-10-16 | End: 2023-10-16

## 2023-10-16 RX ORDER — FAMOTIDINE 20 MG/1
20 TABLET, FILM COATED ORAL EVERY OTHER DAY
Status: DISCONTINUED | OUTPATIENT
Start: 2023-10-16 | End: 2023-10-21 | Stop reason: HOSPADM

## 2023-10-16 RX ADMIN — LATANOPROST 1 DROP: 50 SOLUTION OPHTHALMIC at 21:45

## 2023-10-16 RX ADMIN — CARBIDOPA AND LEVODOPA 1 TABLET: 25; 100 TABLET ORAL at 21:37

## 2023-10-16 RX ADMIN — LABETALOL HYDROCHLORIDE 200 MG: 100 TABLET, FILM COATED ORAL at 21:44

## 2023-10-16 RX ADMIN — PIPERACILLIN AND TAZOBACTAM 3.38 G: 3; .375 INJECTION, POWDER, LYOPHILIZED, FOR SOLUTION INTRAVENOUS at 17:15

## 2023-10-16 RX ADMIN — Medication 250 MG: at 19:03

## 2023-10-16 RX ADMIN — HYDROMORPHONE HYDROCHLORIDE 0.5 MG: 1 INJECTION, SOLUTION INTRAMUSCULAR; INTRAVENOUS; SUBCUTANEOUS at 12:28

## 2023-10-16 RX ADMIN — SENNOSIDES 8.8 MG: 8.8 SYRUP ORAL at 21:45

## 2023-10-16 RX ADMIN — IOHEXOL 50 ML: 240 INJECTION, SOLUTION INTRATHECAL; INTRAVASCULAR; INTRAVENOUS; ORAL at 12:33

## 2023-10-16 RX ADMIN — TAMSULOSIN HYDROCHLORIDE 0.4 MG: 0.4 CAPSULE ORAL at 19:03

## 2023-10-16 RX ADMIN — NYSTATIN: 100000 POWDER TOPICAL at 22:00

## 2023-10-16 RX ADMIN — IPRATROPIUM BROMIDE AND ALBUTEROL SULFATE 3 ML: 2.5; .5 SOLUTION RESPIRATORY (INHALATION) at 20:25

## 2023-10-16 RX ADMIN — APIXABAN 2.5 MG: 2.5 TABLET, FILM COATED ORAL at 19:03

## 2023-10-16 RX ADMIN — LAMOTRIGINE 25 MG: 25 TABLET ORAL at 19:03

## 2023-10-16 RX ADMIN — ONDANSETRON 4 MG: 2 INJECTION INTRAMUSCULAR; INTRAVENOUS at 12:25

## 2023-10-16 RX ADMIN — SODIUM CHLORIDE 1000 ML: 0.9 INJECTION, SOLUTION INTRAVENOUS at 12:09

## 2023-10-16 RX ADMIN — BUDESONIDE 0.25 MG: 0.25 INHALANT ORAL at 20:25

## 2023-10-16 RX ADMIN — FAMOTIDINE 20 MG: 20 TABLET ORAL at 19:03

## 2023-10-16 RX ADMIN — SODIUM CHLORIDE 1000 ML: 0.9 INJECTION, SOLUTION INTRAVENOUS at 14:32

## 2023-10-16 RX ADMIN — MIRTAZAPINE 7.5 MG: 15 TABLET, FILM COATED ORAL at 21:37

## 2023-10-16 NOTE — ASSESSMENT & PLAN NOTE
Lab Results   Component Value Date    EGFR 24 10/16/2023    EGFR 26 10/09/2023    EGFR 24 10/08/2023    CREATININE 2.34 (H) 10/16/2023    CREATININE 2.20 (H) 10/09/2023    CREATININE 2.34 (H) 10/08/2023     Pt follows up with [facility] prior to the last admission. His baseline Creatinine is around 2.2 and EFGR of 20.      Renal dose his medications  Avoid Nephrotoxic medications  Avoid hypotension  Trend Creatinine

## 2023-10-16 NOTE — ED PROVIDER NOTES
History  Chief Complaint   Patient presents with    Abdominal Pain     Abdominal pain. Pt. States it " hurts where my tube is". 80 y.o. male patient with past medical history of prostate cancer status post radical prostatectomy, breast cancer s/p mastectomy, IVC filter, CKD, hypertension, Parkinson's presents to the ED for evaluation of chest pain and shortness of breath after waking up this morning. Patient is a poor historian secondary to dementia. Patient lives with his daughter. Patient woke up today and told daughter that he was having chest pain. Subsequently daughter called EMS and patient arrived to the ED for further evaluation. Patient was recently admitted for hematuria and underwent cystoscopy. Patient used to have a Lieberman catheter in place that was taken out as patient kept pulling on it. Patient is followed by urology. Daughter does not think patient is having enough fluid intake. Upon arrival to the ED patient pointed to the epigastric region as a source of his pain. Patient is tolerating his tube feed. History provided by:  Relative and caregiver (Daughter)  History limited by:  Dementia   used: No        Prior to Admission Medications   Prescriptions Last Dose Informant Patient Reported? Taking? Blood Glucose Monitoring Suppl Supplies MISC   No No   Sig: Use every morning Check FBG daily   Sennosides (senna) 8.8 mg/5 mL oral syrup   No No   Si mL (8.8 mg total) by Per PEG Tube route daily at bedtime   acetaminophen (TYLENOL) 325 mg tablet   No No   Si tablets (650 mg total) by Per G Tube route every 6 (six) hours as needed for mild pain or headaches   amLODIPine (NORVASC) 10 mg tablet   No No   Si tablet (10 mg total) by Per G Tube route daily Do not start before October 10, 2023.    apixaban (ELIQUIS) 2.5 mg   No No   Sig: Take 1 tablet (2.5 mg total) by mouth 2 (two) times a day   apixaban (ELIQUIS) 5 mg   No No   Sig: Take 1 tablet (5 mg total) by mouth 2 (two) times a day for 7 days   ascorbic acid (VITAMIN C) 250 MG tablet   No No   Si tablet (250 mg total) by Per G Tube route daily Do not start before October 10, 2023. budesonide (Pulmicort) 0.25 mg/2 mL nebulizer solution   No No   Sig: Take 2 mL (0.25 mg total) by nebulization 2 (two) times a day Rinse mouth after use. carbidopa-levodopa (PARCOPA)  mg per disintegrating tablet   No No   Sig: Take 1 tablet by mouth 3 (three) times a day   famotidine (PEPCID) 20 mg tablet   No No   Si tablet (20 mg total) by Per G Tube route every other day Do not start before October 10, 2023.    folic acid (KP Folic Acid) 1 mg tablet   No No   Si tablet (1 mg total) by Per G Tube route daily Do not start before October 10, 2023.   hydrocortisone (ANUSOL-HC) 2.5 % rectal cream   No No   Sig: Apply topically 2 (two) times a day for 14 days   ipratropium-albuterol (DUO-NEB) 0.5-2.5 mg/3 mL nebulizer solution   No No   Sig: Take 3 mL by nebulization 3 (three) times a day   labetalol (NORMODYNE) 200 mg tablet   No No   Si tablet (200 mg total) by Per G Tube route every 8 (eight) hours   lamoTRIgine (LaMICtal) 25 mg tablet   No No   Si tablet (25 mg total) by Per G Tube route 2 (two) times a day   latanoprost (XALATAN) 0.005 % ophthalmic solution   No No   Sig: Administer 1 drop to both eyes daily at bedtime   melatonin 3 mg   No No   Si tablet (3 mg total) by Per G Tube route daily at bedtime   mirtazapine (REMERON) 7.5 MG tablet   No No   Sig: Take 1 tablet (7.5 mg total) by mouth daily at bedtime   nystatin (MYCOSTATIN) powder   No No   Sig: Apply topically 3 (three) times a day   polyethylene glycol (MIRALAX) 17 g packet   No No   Si g by Per PEG Tube route daily   polyvinyl alcohol (LIQUIFILM TEARS) 1.4 % ophthalmic solution   No No   Sig: Administer 1 drop to both eyes as needed for dry eyes   saccharomyces boulardii (FLORASTOR) 250 mg capsule   No No   Si capsule (250 mg total) by Per G Tube route 2 (two) times a day   sertraline (ZOLOFT) 100 mg tablet   No No   Si tablet (100 mg total) by Per G Tube route daily Do not start before October 10, 2023. tamsulosin (FLOMAX) 0.4 mg   No No   Sig: Take 1 capsule (0.4 mg total) by mouth daily with dinner      Facility-Administered Medications: None       Past Medical History:   Diagnosis Date    BPH (benign prostatic hyperplasia)     Chronic kidney disease     COPD (chronic obstructive pulmonary disease) (HCC)     Dementia (HCC)     Depression     Diabetes ([address])     DVT (deep venous thrombosis) (HCC)     Dysphagia     Gastrointestinal tube present (HCC)     GERD (gastroesophageal reflux disease)     Glaucoma     HTN (hypertension)     Indwelling Lieberman catheter present     Parkinsons     Prostate cancer [facility])        Past Surgical History:   Procedure Laterality Date    IVC FILTER INSERTION      Per patient's Daughter/Caregiver Pao during inpatient admission    MASTECTOMY Left     TX CYSTO W/IRRIG & EVAC MULTPLE OBSTRUCTING CLOTS Bilateral 10/5/2023    Procedure: CYSTOSCOPY, TUR Bladder Neck contracture, TUR Bladder Tumor 4 CM TUMOR;  Surgeon: Judit Tamayo MD;  Location: [facility];  Service: Urology       History reviewed. No pertinent family history. I have reviewed and agree with the history as documented. E-Cigarette/Vaping    E-Cigarette Use Never User      E-Cigarette/Vaping Substances    Nicotine No     THC No     CBD No     Flavoring No     Other No     Unknown No      Social History     Tobacco Use    Smoking status: Never    Smokeless tobacco: Never   Vaping Use    Vaping Use: Never used   Substance Use Topics    Alcohol use: Never    Drug use: Never       Review of Systems   Unable to perform ROS: Dementia       Physical Exam  Physical Exam  Vitals and nursing note reviewed. Constitutional:       General: He is not in acute distress. Comments: Patient is awake and alert to self. Cachectic appearance noted. HENT:      Head: Normocephalic and atraumatic. Mouth/Throat:      Mouth: Mucous membranes are dry. Comments: Dry mucous membranes noted throughout. Eyes:      Conjunctiva/sclera: Conjunctivae normal.   Cardiovascular:      Rate and Rhythm: Normal rate and regular rhythm. Heart sounds: No murmur heard. Pulmonary:      Effort: Pulmonary effort is normal. No respiratory distress. Breath sounds: Normal breath sounds. Abdominal:      Palpations: Abdomen is soft. Tenderness: There is no abdominal tenderness. Comments: Abdomen is soft, nondistended, with bowel sound present to all 4 quadrants. Mild discomfort noted around PEG tube insertion site. No signs of infection noted to PEG tube insertion site. Musculoskeletal:         General: No swelling. Cervical back: Neck supple. Skin:     General: Skin is warm and dry. Capillary Refill: Capillary refill takes less than 2 seconds.    Psychiatric:         Mood and Affect: Mood normal.         Vital Signs  ED Triage Vitals   Temperature Pulse Respirations Blood Pressure SpO2   10/16/23 1144 10/16/23 1144 10/16/23 1144 10/16/23 1144 10/16/23 1144   98.2 °F (36.8 °C) 72 20 150/71 100 %      Temp src Heart Rate Source Patient Position - Orthostatic VS BP Location FiO2 (%)   -- -- -- -- --             Pain Score       10/16/23 1228       7           Vitals:    10/16/23 1515 10/16/23 1530 10/16/23 1545 10/16/23 1615   BP: 117/58 124/60 125/72 118/73   Pulse: 66 67 66 70         Visual Acuity      ED Medications  Medications   piperacillin-tazobactam (ZOSYN) IVPB 3.375 g (has no administration in time range)   sodium chloride 0.9 % bolus 1,000 mL (0 mL Intravenous Stopped 10/16/23 1421)   iohexol (OMNIPAQUE) 240 MG/ML solution 50 mL (50 mL Oral Given 10/16/23 1233)   ondansetron (ZOFRAN) injection 4 mg (4 mg Intravenous Given 10/16/23 1225)   HYDROmorphone (DILAUDID) injection 0.5 mg (0.5 mg Intravenous Given 10/16/23 1228)   sodium chloride 0.9 % bolus 1,000 mL (0 mL Intravenous Stopped 10/16/23 1611)       Diagnostic Studies  Results Reviewed       Procedure Component Value Units Date/Time    Urine Microscopic [[id number]]  (Abnormal) Collected: 10/16/23 1610    Lab Status: Final result Specimen: Urine, Straight Cath Updated: 10/16/23 1630     RBC, UA Innumerable /hpf      WBC, UA Innumerable /hpf      Epithelial Cells Occasional /hpf      Bacteria, UA       Field obscured, unable to enumerate     /hpf     Hyaline Casts, UA 0-1 /lpf     Urine culture [[id number]] Collected: 10/16/23 1610    Lab Status: In process Specimen: Urine, Straight Cath Updated: 10/16/23 1630    UA w Reflex to Microscopic w Reflex to Culture [[id number]]  (Abnormal) Collected: 10/16/23 1610    Lab Status: Final result Specimen: Urine, Straight Cath Updated: 10/16/23 1615     Color, UA [place]     Clarity, UA Cloudy     Specific Gravity, UA 1.025     pH, UA 5.5     Leukocytes, UA Large     Nitrite, UA Negative     Protein,  (2+) mg/dl      Glucose, UA Negative mg/dl      Ketones, UA Trace mg/dl      Urobilinogen, UA 0.2 E.U./dl      Bilirubin, UA Small     Occult Blood, UA Large    HS Troponin I 2hr [[id number]]  (Normal) Collected: 10/16/23 1422    Lab Status: Final result Specimen: Blood from Arm, Left Updated: 10/16/23 1506     hs TnI 2hr 18 ng/L      Delta 2hr hsTnI -3 ng/L     Lactic acid 2 Hours [[id number]]  (Normal) Collected: 10/16/23 1422    Lab Status: Final result Specimen: Blood from Arm, Left Updated: 10/16/23 1458     LACTIC ACID 1.6 mmol/L     Narrative:      Result may be elevated if tourniquet was used during collection.     HS Troponin I 4hr [[id number]]     Lab Status: No result Specimen: Blood     Lactic acid, plasma (w/reflex if result > 2.0) [[id number]]  (Abnormal) Collected: 10/16/23 1207    Lab Status: Final result Specimen: Blood from Arm, Left Updated: 10/16/23 1300     LACTIC ACID 2.1 mmol/L     Narrative:      Result may be elevated if tourniquet was used during collection.     HS Troponin 0hr (reflex protocol) [[id number]]  (Normal) Collected: 10/16/23 1207    Lab Status: Final result Specimen: Blood from Arm, Left Updated: 10/16/23 1244     hs TnI 0hr 21 ng/L     Comprehensive metabolic panel [[id number]]  (Abnormal) Collected: 10/16/23 1207    Lab Status: Final result Specimen: Blood from Arm, Left Updated: 10/16/23 1238     Sodium 138 mmol/L      Potassium 4.4 mmol/L      Chloride 105 mmol/L      CO2 24 mmol/L      ANION GAP 9 mmol/L      BUN 32 mg/dL      Creatinine 2.34 mg/dL      Glucose 111 mg/dL      Calcium 9.0 mg/dL      Corrected Calcium 9.6 mg/dL      AST 18 U/L      ALT 3 U/L      Alkaline Phosphatase 45 U/L      Total Protein 7.8 g/dL      Albumin 3.2 g/dL      Total Bilirubin 0.45 mg/dL      eGFR 24 ml/min/1.73sq m     Narrative:      [place] guidelines for Chronic Kidney Disease (CKD):     Stage 1 with normal or high GFR (GFR > 90 mL/min/1.73 square meters)    Stage 2 Mild CKD (GFR = 60-89 mL/min/1.73 square meters)    Stage 3A Moderate CKD (GFR = 45-59 mL/min/1.73 square meters)    Stage 3B Moderate CKD (GFR = 30-44 mL/min/1.73 square meters)    Stage 4 Severe CKD (GFR = 15-29 mL/min/1.73 square meters)    Stage 5 End Stage CKD (GFR <15 mL/min/1.73 square meters)  Note: GFR calculation is accurate only with a steady state creatinine    Lipase [[id number]]  (Normal) Collected: 10/16/23 1207    Lab Status: Final result Specimen: Blood from Arm, Left Updated: 10/16/23 1238     Lipase 34 u/L     Magnesium [[id number]]  (Abnormal) Collected: 10/16/23 1207    Lab Status: Final result Specimen: Blood from Arm, Left Updated: 10/16/23 1238     Magnesium 1.8 mg/dL     CBC and differential [[id number]]  (Abnormal) Collected: 10/16/23 1207    Lab Status: Final result Specimen: Blood from Arm, Left Updated: 10/16/23 1217     WBC 7.04 Thousand/uL      RBC 3.75 Million/uL      Hemoglobin 10.3 g/dL      Hematocrit 32.0 % MCV 85 fL      MCH 27.5 pg      MCHC 32.2 g/dL      RDW 16.7 %      MPV 8.8 fL      Platelets 195 Thousands/uL      nRBC 0 /100 WBCs      Neutrophils Relative 62 %      Immat GRANS % 0 %      Lymphocytes Relative 26 %      Monocytes Relative 7 %      Eosinophils Relative 4 %      Basophils Relative 1 %      Neutrophils Absolute 4.33 Thousands/µL      Immature Grans Absolute 0.03 Thousand/uL      Lymphocytes Absolute 1.83 Thousands/µL      Monocytes Absolute 0.51 Thousand/µL      Eosinophils Absolute 0.28 Thousand/µL      Basophils Absolute 0.06 Thousands/µL                    CT chest abdomen pelvis wo contrast   Final Result by Jet Collado MD (10/16 1530)      1) No acute abdominal or pelvic pathology within the aforementioned limitations. 2) PEG tube in place with no associated fluid collections. 3) No bowel obstruction or convincing inflammation. Normal appendix. 4) Urinary bladder obscured by streak artifact from the bilateral hip arthroplasties. No hydronephrosis. 5) Minimal groundglass opacities are seen in the upper lobe centrally, which may be infectious. No other acute pulmonary pathology within the aforementioned limitations. 6) Cardiomegaly. 7) Additional findings as above.                   Workstation performed: [id number]                    Procedures  ECG 12 Lead Documentation Only    Date/Time: 10/16/2023 11:52 AM    Performed by: Sahil Lombardo DO  Authorized by: Sahil Lombardo DO    Indications / Diagnosis:  Weakness  ECG reviewed by me, the ED Provider: yes    Patient location:  ED  Previous ECG:     Previous ECG:  Unavailable    Comparison to cardiac monitor: Yes    Interpretation:     Interpretation: abnormal    Comments:      Considering her first-degree AV block, normal axis, normal intervals, no acute ST elevations noted, low amplitude T waves noted throughout suggesting nonspecific T wave abnormalities, deep S waves noted in V1 through V4 suggesting anterior infarct of undetermined age, no previous EKG available for comparison. ED Course  ED Course as of 10/16/23 1649   Mon Oct 16, 2023   1258 Bladder scan 25cc                               SBIRT 22yo+      Flowsheet Row Most Recent Value   Initial Alcohol Screen: US AUDIT-C     1. How often do you have a drink containing alcohol? 0 Filed at: 10/16/2023 1148   2. How many drinks containing alcohol do you have on a typical day you are drinking? 0 Filed at: 10/16/2023 1148   3a. Male UNDER 65: How often do you have five or more drinks on one occasion? 0 Filed at: 10/16/2023 1148   3b. FEMALE Any Age, or MALE 65+: How often do you have 4 or more drinks on one occassion? 0 Filed at: 10/16/2023 1148   Audit-C Score 0 Filed at: 10/16/2023 1148   ROYA: How many times in the past year have you. .. Used an illegal drug or used a prescription medication for non-medical reasons? Never Filed at: 10/16/2023 1148                      Medical Decision Making  Obtain blood work, CT chest/abdomen/pelvis with p.o. contrast  Give IV fluids and continue to monitor patient for any worsening symptoms. Patient's lab work showed some lactic acidosis. Patient was also hypotensive in the ED responding to 2 L of IV fluid bolus with improvement of blood pressure. At this time patient's symptoms are most likely secondary to dehydration. Bladder scan showed less than 30 cc of urine in the bladder. After fluid boluses were completed, straight cath urine showed concern for UTI. Previous urine culture grew out 3 different organisms with variance and susceptibility of antibiotics. At this time patient will be admitted to give IV Zosyn which was commonly susceptible to the organisms based on previous urine culture. At this time patient will be admitted for further evaluation and management. Family agrees with admission plans. Amount and/or Complexity of Data Reviewed  Labs: ordered.   Radiology: ordered. Risk  Prescription drug management. Disposition  Final diagnoses:   Chest pain   Generalized weakness   Abdominal pain   UTI (urinary tract infection)   Dehydration   CKD (chronic kidney disease)     Time reflects when diagnosis was documented in both MDM as applicable and the Disposition within this note       Time User Action Codes Description Comment    10/16/2023  4:47 PM Carlita Chen Add [R07.9] Chest pain     10/16/2023  4:47 PM Eliecer Arthur Add [R53.1] Generalized weakness     10/16/2023  4:47 PM Carlita Chen Add [R10.9] Abdominal pain     10/16/2023  4:47 PM Carlita Chen Add [N39.0] UTI (urinary tract infection)     10/16/2023  4:47 PM Eliecer Arthur Add [E86.0] Dehydration     10/16/2023  4:47 PM Eliecer Beckham Add [N18.9] CKD (chronic kidney disease)           ED Disposition       ED Disposition   Admit    Condition   Stable    Date/Time   Mon Oct 16, 2023 1640    Comment   Case was discussed with Dr. Mele Josue and the patient's admission status was agreed to be Admission Status: inpatient status to the service of Dr. Mele Josue. Follow-up Information    None         Patient's Medications   Discharge Prescriptions    No medications on file       No discharge procedures on file.     PDMP Review       None            ED Provider  Electronically Signed by             Carlita Chen DO  10/16/23 3045

## 2023-10-16 NOTE — ASSESSMENT & PLAN NOTE
On prior admission patient was unable to swallow his medications and had a risk for aspiration this was placed on G tube. Patient receives all of his enteric feeds and medications via the G-tube.      Check gastrotomy tube daily, to tube care  Feeds consist of Glucerna 1.2, 75 ml/h continuous with water boluses every 4 hours with 120ml flush  Increase in water bolus due to creatinine trending up  Soft restraints, to avoid pulling out the G tube

## 2023-10-16 NOTE — QUICK NOTE
Patient well-known to me history of prostate cancer status post radical prostatectomy 20+ years ago in [place] presenting to [address] [place] [address] some 4 weeks ago with chronic UTIs chronic catheter patient discharged from [facility] after 37-day stay with catheter in light of dementia pulling Lieberman into the bladder neck seen in the ER with Lieberman catheter reinserted into the bladder taken to the OR undergoing cystoscopy transurethral resection bladder neck contracture and biopsy of suspicious bladder lesion which confirmed to be benign process PSA 5 patient discharged without catheter emptying his bladder well now found to have urinary tract infection no further urologic intervention warranted at this time patient actually home with daughter for only 1 week and may need permanent nursing home placement in light of multiple comorbidities

## 2023-10-16 NOTE — ASSESSMENT & PLAN NOTE
Chronic, stable  Continue home medications: Mirtazapine 7.5 mg daily at bedtime, Zoloft 100 mg per G-tube daily

## 2023-10-16 NOTE — ASSESSMENT & PLAN NOTE
Chronic, stable  BP 10/19: 130/59    Continue home medications Labetalol 200mg Q8  Continue amlodipine 10mg  Monitor blood pressure

## 2023-10-16 NOTE — ASSESSMENT & PLAN NOTE
Chronic, stable    Patient has a past medical history of COPD, stable on his home medications  Continue Pulmicort 0.25 twice daily  Continue DuoNebs 3 times daily

## 2023-10-16 NOTE — ASSESSMENT & PLAN NOTE
Lab Results   Component Value Date    EGFR 22 10/17/2023    EGFR 24 10/16/2023    EGFR 26 10/09/2023    CREATININE 2.49 (H) 10/17/2023    CREATININE 2.34 (H) 10/16/2023    CREATININE 2.20 (H) 10/09/2023

## 2023-10-16 NOTE — ASSESSMENT & PLAN NOTE
Pt is a 79 yo male w/dementia, is + UA for large leukocytes, large occult blood, small bilirubin, trace ketones and small bilirubin. On Admission, according to pt's daughter he has not complained about difficulty urinating but had decreased frequency. In the afternoon he would have increased residual. Bladder scan showed 25cc of urine. WBC 7.04, afebrile, no tachycardia, no tachypnea. 10/17:Pt has bloody urine and decreased urine output on 10/17.  Post voidal scan showed 580ml, 22f Lieberman placed  Urine culture: Proteus mirabilis ESBL Abnormal     Continue to Monitor vital signs  Monitor I and Os  Nephrology consulted, recs appreciated  Discontinue Eliquis, due to hematuria

## 2023-10-16 NOTE — H&P
History and Physical - [address] [facility]     Patient Information: Dani Santana 80 y.o. male MRN: [id number]  Unit/Bed#: ED 06 Encounter: [id number]  Admitting Physician: No admitting provider for patient encounter. PCP: ROSITA Monae  Date of Admission:  10/16/23     Assessment and Plan     * Chest pain  Assessment & Plan  Patient 80year-old with PMH of Parkinson's, dementia, minimally verbal. According to the daughter, patient had chest pain, chest tightness and heavy breathing since today morning. She gave him 1 treatment of albuterol and Pulmicort but it did not improve. Patient has no cough, but was a little short of breath when he presented to the ED. When the daughter asked him where the pain was he was pointing to her chest.  No other complaints by the daughter, ROS was unobtainable due to minimally verbal patient. In the ED, patient pointed epigastric region as a source of pain. LA: 2.1<1.6, Trops: (0)21;(2)18;(4)18; Hgb 10.3;  EKG: Sinus rhythm w/sinus arrhythmia with 1st degree A-V block, Qtc 411  CT A/B: No acute abdominal or pelvic pathology within the aforementioned limitations. PEG tube in place with no associated fluid collections. No bowel obstruction or convincing inflammation. Normal appendix. Urinary bladder obscured by streak artifact from the bilateral hip arthroplasties. No hydronephrosis. Minimal groundglass opacities are seen in the upper lobe centrally, which may be infectious.      Continue to Monitor vital signs  Start Azithromycin 500mg Q24  Continue Duo-nebs Q8  Continue Pulmicort 0.25 twice daily  Covid test pending  Procal pending          Acute renal injury [facility])  Assessment & Plan    Lab Results   Component Value Date    EGFR 24 10/16/2023    EGFR 26 10/09/2023    EGFR 24 10/08/2023    CREATININE 2.34 (H) 10/16/2023    CREATININE 2.20 (H) 10/09/2023    CREATININE 2.34 (H) 10/08/2023     Pt has A hx of Stage 3 CKD, follows up with [facility] prior to the last admission. His baseline Creatinine is around 2.0 and EFGR of 20. Renal dose his medications  Avoid Nephrotoxic medications  Avoid hypotension  Trend Creatinine    Chronic UTI  Assessment & Plan  Pt is a 79 yo male w/dementia, is + UA for large leukocytes, large occult blood, small bilirubin, trace ketones and small bilirubin. According to pt's daughter he has not complained about difficulty urinating but had decreased frequency. In the afternoon he would have increased residual. Bladder scan showed 25cc of urine. WBC 7.04, afebrile, no tachycardia, no tachypnea. Continue to Monitor vital signs  Urine culture pending  Monitor I and Os  Nephrology consulted    Chronic deep vein thrombosis (DVT) of femoral vein of both lower extremities (HCC)  Assessment & Plan  Pt has a hx of chronic DVTs w/IVC filter in place. During last admission he was found to have Acute right lower extremity DVT in EIV and CFV.  Pt was started Eliquis loading dose of 5 mg BID for 7 days and than 2.5 mg BID    Continue patient's Eliquis 2.5mg BID  Monitor signs for any new swelling on lower extremities    Depression  Assessment & Plan  Chronic, stable  Continue home medications: Mirtazapine 7.5 mg daily at bedtime, Zoloft 100 mg per G-tube daily    Dry eyes  Assessment & Plan  Chronic, stable  Continue artificial tears 1 drop in each eye 3 times daily as needed    Gastroesophageal reflux disease  Assessment & Plan  Chronic, stable  Continue home medication Pepcid 20 mg every other day    Constipation  Assessment & Plan  Chronic, stable  Continue home meds MiraLAX, senna per G-tube    Chronic obstructive pulmonary disease (HCC)  Assessment & Plan  Chronic, stable    Patient has a past medical history of COPD, stable on his home medications  Continue Pulmicort 0.25 twice daily  Continue DuoNebs 3 times daily    Dysphagia  Assessment & Plan  On prior admission patient was unable to swallow his medications and had a risk for aspiration this was placed on G tube. Patient receives all of his enteric feeds and medications via the G-tube. Patient's creatinine is 2.34. Check gastrotomy tube daily  Feeds consist of Glucerna 1.2, 85 ml/h continuous with water boluses every 4 hours   Increase in water bolus due to creatinine trending up    Severe dementia [facility])  Assessment & Plan  Chronic,  Patient has a history of progressive dementia, secondary to Parkinson's disease. Hypertension  Assessment & Plan  Chronic, stable  BP 10/16: 150/71    Continue home medications Labetalol 200mg Q8  Continue amlodipine 10mg  Monitor blood pressure    PD (Parkinson's disease)  Assessment & Plan  Chronic, stable    Pt is minimally verbal, and will sometimes answer questions. Continue carbidopa levodopa  mg 3 times daily per G-tube  Continue lamotrigine 25 mg twice daily per G-tube         Fluids: NA  Electrolyte repletion: Replete as needed. Nutrition:    VTE Prophylaxis:   High Risk (Score >/= 5) - Pharmacological DVT Prophylaxis Ordered: apixaban (Eliquis). Sequential Compression Devices Ordered. Code Status: Prior  Anticipated Length of Stay:  Patient will be admitted on an Emergency basis with an anticipated length of stay of  > than 2 midnights. Justification for Hospital Stay: Home  Total Time for Visit, including Counseling / Coordination of Care: >30 mins. Greater than 50% of this total time spent on direct patient counseling and coordination of care. Patient Information Sharing: With the consent of Liz Byers, their loved ones Leah Knutson) were notified today by inpatient team of the patient’s condition and current plan. All questions answered. Chief Complaint:     Chief Complaint   Patient presents with    Abdominal Pain     Abdominal pain. Pt. States it " hurts where my tube is".         Subjective      History of Present Illness:     Liz Byers is a 80 y.o. male w/pmh prostate cancer s/p radical prostatectomy, breast cancer s/p mastectomy, IVC filter, CKD, hypertension, dementia, Parkinson's, depression presented to the ED for evaluation of chest pain and shortness of breath after waking up this morning. Patient is minimally verbal and poor historian. All history is obtained from the daughter, who is his primary caregiver at her home. According to the daughter, patient woke up today and said that he was having chest pain. When the daughter asked him more questions he pointed towards her chest, had labored breathing and chest tightness. He did not have any cough. Daughter also mentions that patient has had decreased urinary frequency, he also has an increased residual  from mid afternoon to later in the evening since last couple days. Patient does not have any urinary discomfort. In the ED, when asked the patient where his pain is, he pointed to his G-tube. Patient did not have any fever at home.     PCP:Tuesday     Review of Systems:  Review of Systems  Unobtainable, patient is minimally verbal     Past Medical History:   Diagnosis Date    BPH (benign prostatic hyperplasia)     Chronic kidney disease     COPD (chronic obstructive pulmonary disease) ([facility])     Dementia ([address])     Depression     Diabetes ([address])     DVT (deep venous thrombosis) ([facility])     Dysphagia     Gastrointestinal tube present (HCC)     GERD (gastroesophageal reflux disease)     Glaucoma     HTN (hypertension)     Indwelling Lieberman catheter present     Parkinsons     Prostate cancer [facility])      Past Surgical History:   Procedure Laterality Date    IVC FILTER INSERTION      Per patient's Daughter/Caregiver Pao during inpatient admission    MASTECTOMY Left     SC CYSTO W/IRRIG & EVAC MULTPLE OBSTRUCTING CLOTS Bilateral 10/5/2023    Procedure: CYSTOSCOPY, TUR Bladder Neck contracture, TUR Bladder Tumor 4 CM TUMOR;  Surgeon: Jillian Martin MD;  Location: [facility]IN OR;  Service: Urology     Allergies   Allergen Reactions Lactose - Food Allergy GI Intolerance     Prior to Admission Medications   Prescriptions Last Dose Informant Patient Reported? Taking? Blood Glucose Monitoring Suppl Supplies MISC   No No   Sig: Use every morning Check FBG daily   Sennosides (senna) 8.8 mg/5 mL oral syrup   No No   Si mL (8.8 mg total) by Per PEG Tube route daily at bedtime   acetaminophen (TYLENOL) 325 mg tablet   No No   Si tablets (650 mg total) by Per G Tube route every 6 (six) hours as needed for mild pain or headaches   amLODIPine (NORVASC) 10 mg tablet   No No   Si tablet (10 mg total) by Per G Tube route daily Do not start before October 10, 2023. apixaban (ELIQUIS) 2.5 mg   No No   Sig: Take 1 tablet (2.5 mg total) by mouth 2 (two) times a day   apixaban (ELIQUIS) 5 mg   No No   Sig: Take 1 tablet (5 mg total) by mouth 2 (two) times a day for 7 days   ascorbic acid (VITAMIN C) 250 MG tablet   No No   Si tablet (250 mg total) by Per G Tube route daily Do not start before October 10, 2023. budesonide (Pulmicort) 0.25 mg/2 mL nebulizer solution   No No   Sig: Take 2 mL (0.25 mg total) by nebulization 2 (two) times a day Rinse mouth after use. carbidopa-levodopa (PARCOPA)  mg per disintegrating tablet   No No   Sig: Take 1 tablet by mouth 3 (three) times a day   famotidine (PEPCID) 20 mg tablet   No No   Si tablet (20 mg total) by Per G Tube route every other day Do not start before October 10, 2023.    folic acid (KP Folic Acid) 1 mg tablet   No No   Si tablet (1 mg total) by Per G Tube route daily Do not start before October 10, 2023.   hydrocortisone (ANUSOL-HC) 2.5 % rectal cream   No No   Sig: Apply topically 2 (two) times a day for 14 days   ipratropium-albuterol (DUO-NEB) 0.5-2.5 mg/3 mL nebulizer solution   No No   Sig: Take 3 mL by nebulization 3 (three) times a day   labetalol (NORMODYNE) 200 mg tablet   No No   Si tablet (200 mg total) by Per G Tube route every 8 (eight) hours   lamoTRIgine (LaMICtal) 25 mg tablet   No No   Si tablet (25 mg total) by Per G Tube route 2 (two) times a day   latanoprost (XALATAN) 0.005 % ophthalmic solution   No No   Sig: Administer 1 drop to both eyes daily at bedtime   melatonin 3 mg   No No   Si tablet (3 mg total) by Per G Tube route daily at bedtime   mirtazapine (REMERON) 7.5 MG tablet   No No   Sig: Take 1 tablet (7.5 mg total) by mouth daily at bedtime   nystatin (MYCOSTATIN) powder   No No   Sig: Apply topically 3 (three) times a day   polyethylene glycol (MIRALAX) 17 g packet   No No   Si g by Per PEG Tube route daily   polyvinyl alcohol (LIQUIFILM TEARS) 1.4 % ophthalmic solution   No No   Sig: Administer 1 drop to both eyes as needed for dry eyes   saccharomyces boulardii (FLORASTOR) 250 mg capsule   No No   Si capsule (250 mg total) by Per G Tube route 2 (two) times a day   sertraline (ZOLOFT) 100 mg tablet   No No   Si tablet (100 mg total) by Per G Tube route daily Do not start before October 10, 2023.    tamsulosin (FLOMAX) 0.4 mg   No No   Sig: Take 1 capsule (0.4 mg total) by mouth daily with dinner      Facility-Administered Medications: None     Social History     Socioeconomic History    Marital status:      Spouse name: Not on file    Number of children: Not on file    Years of education: Not on file    Highest education level: Not on file   Occupational History    Not on file   Tobacco Use    Smoking status: Never    Smokeless tobacco: Never   Vaping Use    Vaping Use: Never used   Substance and Sexual Activity    Alcohol use: Never    Drug use: Never    Sexual activity: Not on file   Other Topics Concern    Not on file   Social History Narrative    Not on file     Social Determinants of Health     Financial Resource Strain: Unknown (2023)    Overall Financial Resource Strain (CARDIA)     Difficulty of Paying Living Expenses: Patient refused   Food Insecurity: Not on file   Transportation Needs: Not on file Physical Activity: Not on file   Stress: Not on file   Social Connections: Not on file   Intimate Partner Violence: Not on file   Housing Stability: Low Risk  (10/2/2023)    Housing Stability Vital Sign     Unable to Pay for Housing in the Last Year: No     Number of Places Lived in the Last Year: 2     Unstable Housing in the Last Year: No     History reviewed. No pertinent family history. Patient Pre-hospital Living Situation: Home  Patient Pre-hospital Level of Mobility: Functional quadriplegia  Patient Pre-hospital Diet Restrictions: Carb-control per G tube          Objective     Physical Exam:   Vitals:   Blood Pressure: 118/73 (10/16/23 1615)  Pulse: 70 (10/16/23 1615)  Temperature: 98.2 °F (36.8 °C) (10/16/23 1144)  Respirations: 14 (10/16/23 1615)  Weight - Scale: 86.2 kg (190 lb) (10/16/23 1144)  SpO2: 92 % (10/16/23 1615)     Physical Exam  Eyes:      General:         Right eye: No discharge. Cardiovascular:      Rate and Rhythm: Normal rate and regular rhythm. Pulses: Normal pulses. Heart sounds: Normal heart sounds. Pulmonary:      Effort: Pulmonary effort is normal. No respiratory distress. Breath sounds: Normal breath sounds. Abdominal:      General: Abdomen is flat. Palpations: Abdomen is soft. Comments: G tube in place. no edema, tenderness or pus around the insertion site. Musculoskeletal:         General: No swelling, tenderness, deformity or signs of injury. Right lower leg: No edema. Left lower leg: No edema. Skin:     General: Skin is warm. Capillary Refill: Capillary refill takes 2 to 3 seconds. Neurological:      General: No focal deficit present. Mental Status: He is alert. He is disoriented. Lab Results: I have personally reviewed pertinent reports.     Results from last 7 days   Lab Units 10/16/23  1207   WBC Thousand/uL 7.04   HEMOGLOBIN g/dL 10.3*   HEMATOCRIT % 32.0*   PLATELETS Thousands/uL 334   NEUTROS PCT % 62 LYMPHS PCT % 26   MONOS PCT % 7   EOS PCT % 4     Results from last 7 days   Lab Units 10/16/23  1207   POTASSIUM mmol/L 4.4   CHLORIDE mmol/L 105   CO2 mmol/L 24   BUN mg/dL 32*   CREATININE mg/dL 2.34*   CALCIUM mg/dL 9.0   ALK PHOS U/L 45   ALT U/L 3*   AST U/L 18   EGFR ml/min/1.73sq m 24   MAGNESIUM mg/dL 1.8*         Results from last 7 days   Lab Units 10/16/23  1422 10/16/23  1207   LACTIC ACID mmol/L 1.6 2.1*              Results from last 7 days   Lab Units 10/16/23  1610   COLOR UA  Alba   CLARITY UA  Cloudy   SPEC GRAV UA  1.025   PH UA  5.5   LEUKOCYTES UA  Large*   NITRITE UA  Negative   GLUCOSE UA mg/dl Negative   KETONES UA mg/dl Trace*   BILIRUBIN UA  Small*   BLOOD UA  Large*      Results from last 7 days   Lab Units 10/16/23  1610   RBC UA /hpf Innumerable*   WBC UA /hpf Innumerable*   EPITHELIAL CELLS WET PREP /hpf Occasional   BACTERIA UA /hpf Field obscured, unable to enumerate*      Results from last 7 days   Lab Units 10/16/23  1422 10/16/23  1207   HS TNI 0HR ng/L  --  21   HS TNI 2HR ng/L 18  --              Imaging: I have personally reviewed pertinent reports. CT chest abdomen pelvis wo contrast    Result Date: 10/16/2023  1) No acute abdominal or pelvic pathology within the aforementioned limitations. 2) PEG tube in place with no associated fluid collections. 3) No bowel obstruction or convincing inflammation. Normal appendix. 4) Urinary bladder obscured by streak artifact from the bilateral hip arthroplasties. No hydronephrosis. 5) Minimal groundglass opacities are seen in the upper lobe centrally, which may be infectious. No other acute pulmonary pathology within the aforementioned limitations. 6) Cardiomegaly. 7) Additional findings as above.  Workstation performed: [id number]         EKG, Pathology, and Other Studies Reviewed on Admission:   EKG  Result Date: 10/16/23  Impression: Sinus rhythm with sinus arrhythmia with 1st degree A-V block  Poor R Wave Progression, Qtc 411 Entire H&P was discussed with  who agreed to what is noted above     ** Please Note: This note has been constructed using a voice recognition system **     Alicia Mensah MD  10/16/23  4:46 PM

## 2023-10-16 NOTE — ASSESSMENT & PLAN NOTE
Chronic, stable  Once patient has diarrhea, continuous use of MiraLAX should be avoided. However MiraLAX can be given if he becomes constipated.   Continue probiotics

## 2023-10-16 NOTE — ASSESSMENT & PLAN NOTE
Patient 80year-old with PMH of Parkinson's, dementia, minimally verbal. According to the daughter, patient had chest pain and tightness a/s with heavy breathing, She gave him 1 treatment of albuterol and Pulmicort with no improvement. His troponins were found to be elevated, possibly due to myocardial demand, hemoglobin was 10.3 and EKG showed sinus rhythm and first-degree AV block with a QTc of 411. Patient was given nebulization treatment to help with his breathing with an antibiotic coverage. He was started on azithromycin 500 mg every 12 hourly. Echo showed normal EF.   Throughout his hospital stay his work of breathing was monitored, saturation was maintained and vital signs were monitored

## 2023-10-16 NOTE — PLAN OF CARE
Problem: RESPIRATORY - ADULT  Goal: Achieves optimal ventilation and oxygenation  Description: INTERVENTIONS:  - Assess for changes in respiratory status  - Assess for changes in mentation and behavior  - Position to facilitate oxygenation and minimize respiratory effort  - Oxygen administered by appropriate delivery if ordered  - Initiate smoking cessation education as indicated  - Encourage broncho-pulmonary hygiene including cough, deep breathe, Incentive Spirometry  - Assess the need for suctioning and aspirate as needed  - Assess and instruct to report SOB or any respiratory difficulty  - Respiratory Therapy support as indicated  - Nebulizer  Outcome: Progressing     Problem: MOBILITY - ADULT  Goal: Maintain or return to baseline ADL function  Description: INTERVENTIONS:  -  Assess patient's ability to carry out ADLs; assess patient's baseline for ADL function and identify physical deficits which impact ability to perform ADLs (bathing, care of mouth/teeth, toileting, grooming, dressing, etc.)  - Assess/evaluate cause of self-care deficits   - Assess range of motion  - Assess patient's mobility; develop plan if impaired  - Assess patient's need for assistive devices and provide as appropriate  - Encourage maximum independence but intervene and supervise when necessary  - Involve family in performance of ADLs  - Assess for home care needs following discharge   - Consider OT consult to assist with ADL evaluation and planning for discharge  - Provide patient education as appropriate  Outcome: Progressing  Goal: Maintains/Returns to pre admission functional level  Description: INTERVENTIONS:  - Perform BMAT or MOVE assessment daily.   - Set and communicate daily mobility goal to care team and patient/family/caregiver.    - Collaborate with rehabilitation services on mobility goals if consulted  - Record patient progress and toleration of activity level   Outcome: Progressing     Problem: Prexisting or High Potential for Compromised Skin Integrity  Goal: Skin integrity is maintained or improved  Description: INTERVENTIONS:  - Identify patients at risk for skin breakdown  - Assess and monitor skin integrity  - Assess and monitor nutrition and hydration status  - Monitor labs   - Assess for incontinence   - Turn and reposition patient  - Assist with mobility/ambulation  - Relieve pressure over bony prominences  - Avoid friction and shearing  - Provide appropriate hygiene as needed including keeping skin clean and dry  - Evaluate need for skin moisturizer/barrier cream  - Collaborate with interdisciplinary team   - Patient/family teaching  - Consider wound care consult   Outcome: Progressing

## 2023-10-16 NOTE — ASSESSMENT & PLAN NOTE
Chronic, stable    Pt is minimally verbal, and will sometimes answer questions.      Continue carbidopa levodopa  mg 3 times daily per G-tube  Continue lamotrigine 25 mg twice daily per G-tube

## 2023-10-16 NOTE — ASSESSMENT & PLAN NOTE
Pt has a hx of chronic DVTs w/IVC filter in place. During last admission he was found to have Acute right lower extremity DVT in EIV and CFV. Pt was started Eliquis loading dose of 5 mg BID for 7 days and than 2.5 mg BID. Pt developed hematuria after re-starting Eliquis 10/18.      Discontinue Eliquis indefinitely  Monitor signs for any new swelling on lower extremities

## 2023-10-17 ENCOUNTER — APPOINTMENT (INPATIENT)
Dept: NON INVASIVE DIAGNOSTICS | Facility: HOSPITAL | Age: 83
DRG: 689 | End: 2023-10-17
Payer: MEDICARE

## 2023-10-17 DIAGNOSIS — E11.9 TYPE 2 DIABETES MELLITUS WITHOUT COMPLICATION, WITHOUT LONG-TERM CURRENT USE OF INSULIN (HCC): ICD-10-CM

## 2023-10-17 LAB
ANION GAP SERPL CALCULATED.3IONS-SCNC: 9 MMOL/L
AORTIC ROOT: 3.1 CM
APICAL FOUR CHAMBER EJECTION FRACTION: 69 %
BASOPHILS # BLD AUTO: 0.06 THOUSANDS/ÂΜL (ref 0–0.1)
BASOPHILS NFR BLD AUTO: 1 % (ref 0–1)
BUN SERPL-MCNC: 35 MG/DL (ref 5–25)
CALCIUM SERPL-MCNC: 8.8 MG/DL (ref 8.4–10.2)
CHLORIDE SERPL-SCNC: 109 MMOL/L (ref 96–108)
CO2 SERPL-SCNC: 21 MMOL/L (ref 21–32)
CREAT SERPL-MCNC: 2.49 MG/DL (ref 0.6–1.3)
E WAVE DECELERATION TIME: 255 MS
E/A RATIO: 1.04
EOSINOPHIL # BLD AUTO: 0.34 THOUSAND/ÂΜL (ref 0–0.61)
EOSINOPHIL NFR BLD AUTO: 5 % (ref 0–6)
ERYTHROCYTE [DISTWIDTH] IN BLOOD BY AUTOMATED COUNT: 16.7 % (ref 11.6–15.1)
FRACTIONAL SHORTENING: 36 (ref 28–44)
GFR SERPL CREATININE-BSD FRML MDRD: 22 ML/MIN/1.73SQ M
GLUCOSE SERPL-MCNC: 68 MG/DL (ref 65–140)
HCT VFR BLD AUTO: 28.6 % (ref 36.5–49.3)
HGB BLD-MCNC: 8.9 G/DL (ref 12–17)
IMM GRANULOCYTES # BLD AUTO: 0.02 THOUSAND/UL (ref 0–0.2)
IMM GRANULOCYTES NFR BLD AUTO: 0 % (ref 0–2)
INTERVENTRICULAR SEPTUM IN DIASTOLE (PARASTERNAL SHORT AXIS VIEW): 1.4 CM
INTERVENTRICULAR SEPTUM: 1.4 CM (ref 0.6–1.1)
LAAS-AP2: 22.8 CM2
LAAS-AP4: 22 CM2
LEFT ATRIUM SIZE: 4.3 CM
LEFT ATRIUM VOLUME (MOD BIPLANE): 69 ML
LEFT ATRIUM VOLUME INDEX (MOD BIPLANE): 33.2 ML/M2
LEFT INTERNAL DIMENSION IN SYSTOLE: 2.3 CM (ref 2.1–4)
LEFT VENTRICULAR INTERNAL DIMENSION IN DIASTOLE: 3.6 CM (ref 3.5–6)
LEFT VENTRICULAR POSTERIOR WALL IN END DIASTOLE: 1.4 CM
LEFT VENTRICULAR STROKE VOLUME: 36 ML
LVSV (TEICH): 36 ML
LYMPHOCYTES # BLD AUTO: 1.97 THOUSANDS/ÂΜL (ref 0.6–4.47)
LYMPHOCYTES NFR BLD AUTO: 29 % (ref 14–44)
MAGNESIUM SERPL-MCNC: 1.8 MG/DL (ref 1.9–2.7)
MCH RBC QN AUTO: 27.1 PG (ref 26.8–34.3)
MCHC RBC AUTO-ENTMCNC: 31.1 G/DL (ref 31.4–37.4)
MCV RBC AUTO: 87 FL (ref 82–98)
MONOCYTES # BLD AUTO: 0.7 THOUSAND/ÂΜL (ref 0.17–1.22)
MONOCYTES NFR BLD AUTO: 10 % (ref 4–12)
MV E'TISSUE VEL-LAT: 9 CM/S
MV E'TISSUE VEL-SEP: 8 CM/S
MV PEAK A VEL: 0.72 M/S
MV PEAK E VEL: 75 CM/S
MV STENOSIS PRESSURE HALF TIME: 74 MS
MV VALVE AREA P 1/2 METHOD: 2.97
NEUTROPHILS # BLD AUTO: 3.74 THOUSANDS/ÂΜL (ref 1.85–7.62)
NEUTS SEG NFR BLD AUTO: 55 % (ref 43–75)
NRBC BLD AUTO-RTO: 0 /100 WBCS
PLATELET # BLD AUTO: 307 THOUSANDS/UL (ref 149–390)
PMV BLD AUTO: 9.2 FL (ref 8.9–12.7)
POTASSIUM SERPL-SCNC: 4.1 MMOL/L (ref 3.5–5.3)
PROCALCITONIN SERPL-MCNC: 0.12 NG/ML
RBC # BLD AUTO: 3.29 MILLION/UL (ref 3.88–5.62)
RIGHT ATRIUM AREA SYSTOLE A4C: 13.1 CM2
RIGHT VENTRICLE ID DIMENSION: 3.6 CM
SL CV LEFT ATRIUM LENGTH A2C: 5.5 CM
SL CV LV EF: 60
SL CV PED ECHO LEFT VENTRICLE DIASTOLIC VOLUME (MOD BIPLANE) 2D: 53 ML
SL CV PED ECHO LEFT VENTRICLE SYSTOLIC VOLUME (MOD BIPLANE) 2D: 18 ML
SODIUM SERPL-SCNC: 139 MMOL/L (ref 135–147)
TR MAX PG: 34 MMHG
TR PEAK VELOCITY: 2.9 M/S
TRICUSPID ANNULAR PLANE SYSTOLIC EXCURSION: 2 CM
TRICUSPID VALVE PEAK REGURGITATION VELOCITY: 2.92 M/S
WBC # BLD AUTO: 6.83 THOUSAND/UL (ref 4.31–10.16)

## 2023-10-17 PROCEDURE — 99232 SBSQ HOSP IP/OBS MODERATE 35: CPT | Performed by: FAMILY MEDICINE

## 2023-10-17 PROCEDURE — 94640 AIRWAY INHALATION TREATMENT: CPT

## 2023-10-17 PROCEDURE — 84145 PROCALCITONIN (PCT): CPT

## 2023-10-17 PROCEDURE — 80048 BASIC METABOLIC PNL TOTAL CA: CPT

## 2023-10-17 PROCEDURE — 85025 COMPLETE CBC W/AUTO DIFF WBC: CPT

## 2023-10-17 PROCEDURE — 93306 TTE W/DOPPLER COMPLETE: CPT | Performed by: INTERNAL MEDICINE

## 2023-10-17 PROCEDURE — 94760 N-INVAS EAR/PLS OXIMETRY 1: CPT

## 2023-10-17 PROCEDURE — 93306 TTE W/DOPPLER COMPLETE: CPT

## 2023-10-17 PROCEDURE — 99222 1ST HOSP IP/OBS MODERATE 55: CPT | Performed by: NURSE PRACTITIONER

## 2023-10-17 PROCEDURE — 83735 ASSAY OF MAGNESIUM: CPT

## 2023-10-17 RX ORDER — MIRTAZAPINE 15 MG/1
15 TABLET, FILM COATED ORAL
Status: DISCONTINUED | OUTPATIENT
Start: 2023-10-17 | End: 2023-10-21 | Stop reason: HOSPADM

## 2023-10-17 RX ORDER — OXYBUTYNIN CHLORIDE 5 MG/1
5 TABLET ORAL 3 TIMES DAILY
Status: DISCONTINUED | OUTPATIENT
Start: 2023-10-17 | End: 2023-10-21 | Stop reason: HOSPADM

## 2023-10-17 RX ORDER — MAGNESIUM SULFATE HEPTAHYDRATE 40 MG/ML
2 INJECTION, SOLUTION INTRAVENOUS ONCE
Status: COMPLETED | OUTPATIENT
Start: 2023-10-17 | End: 2023-10-17

## 2023-10-17 RX ADMIN — BUDESONIDE 0.25 MG: 0.25 INHALANT ORAL at 07:52

## 2023-10-17 RX ADMIN — Medication 250 MG: at 09:24

## 2023-10-17 RX ADMIN — LATANOPROST 1 DROP: 50 SOLUTION OPHTHALMIC at 21:29

## 2023-10-17 RX ADMIN — AMLODIPINE BESYLATE 10 MG: 10 TABLET ORAL at 09:24

## 2023-10-17 RX ADMIN — LABETALOL HYDROCHLORIDE 200 MG: 100 TABLET, FILM COATED ORAL at 15:38

## 2023-10-17 RX ADMIN — Medication 250 MG: at 17:23

## 2023-10-17 RX ADMIN — NYSTATIN: 100000 POWDER TOPICAL at 10:08

## 2023-10-17 RX ADMIN — MAGNESIUM SULFATE HEPTAHYDRATE 2 G: 40 INJECTION, SOLUTION INTRAVENOUS at 09:12

## 2023-10-17 RX ADMIN — APIXABAN 2.5 MG: 2.5 TABLET, FILM COATED ORAL at 09:25

## 2023-10-17 RX ADMIN — CARBIDOPA AND LEVODOPA 1 TABLET: 25; 100 TABLET ORAL at 21:28

## 2023-10-17 RX ADMIN — SERTRALINE 100 MG: 100 TABLET, FILM COATED ORAL at 09:24

## 2023-10-17 RX ADMIN — CARBIDOPA AND LEVODOPA 1 TABLET: 25; 100 TABLET ORAL at 15:38

## 2023-10-17 RX ADMIN — AZITHROMYCIN MONOHYDRATE 500 MG: 500 INJECTION, POWDER, LYOPHILIZED, FOR SOLUTION INTRAVENOUS at 18:15

## 2023-10-17 RX ADMIN — LAMOTRIGINE 25 MG: 25 TABLET ORAL at 09:24

## 2023-10-17 RX ADMIN — IPRATROPIUM BROMIDE AND ALBUTEROL SULFATE 3 ML: 2.5; .5 SOLUTION RESPIRATORY (INHALATION) at 07:52

## 2023-10-17 RX ADMIN — LABETALOL HYDROCHLORIDE 200 MG: 100 TABLET, FILM COATED ORAL at 21:31

## 2023-10-17 RX ADMIN — BUDESONIDE 0.25 MG: 0.25 INHALANT ORAL at 19:19

## 2023-10-17 RX ADMIN — LABETALOL HYDROCHLORIDE 200 MG: 100 TABLET, FILM COATED ORAL at 05:37

## 2023-10-17 RX ADMIN — OXYBUTYNIN CHLORIDE 5 MG: 5 TABLET ORAL at 09:24

## 2023-10-17 RX ADMIN — IPRATROPIUM BROMIDE AND ALBUTEROL SULFATE 3 ML: 2.5; .5 SOLUTION RESPIRATORY (INHALATION) at 13:37

## 2023-10-17 RX ADMIN — CARBIDOPA AND LEVODOPA 1 TABLET: 25; 100 TABLET ORAL at 09:24

## 2023-10-17 RX ADMIN — OXYBUTYNIN CHLORIDE 5 MG: 5 TABLET ORAL at 21:28

## 2023-10-17 RX ADMIN — OXYBUTYNIN CHLORIDE 5 MG: 5 TABLET ORAL at 15:38

## 2023-10-17 RX ADMIN — IPRATROPIUM BROMIDE AND ALBUTEROL SULFATE 3 ML: 2.5; .5 SOLUTION RESPIRATORY (INHALATION) at 19:19

## 2023-10-17 RX ADMIN — LAMOTRIGINE 25 MG: 25 TABLET ORAL at 17:24

## 2023-10-17 RX ADMIN — NYSTATIN: 100000 POWDER TOPICAL at 15:38

## 2023-10-17 RX ADMIN — FOLIC ACID 1 MG: 1 TABLET ORAL at 09:25

## 2023-10-17 RX ADMIN — MIRTAZAPINE 15 MG: 15 TABLET, FILM COATED ORAL at 21:28

## 2023-10-17 RX ADMIN — NYSTATIN: 100000 POWDER TOPICAL at 21:31

## 2023-10-17 RX ADMIN — SENNOSIDES 8.8 MG: 8.8 SYRUP ORAL at 21:31

## 2023-10-17 NOTE — CONSULTS
H&P Exam - Urology       Patient: Miesha Kearns   : 1940 Sex: male   MRN: [id number]     CSN: [id number]      History of Present Illness   HPI:  Miesha Kearns is a 80 y.o. male please see quick note from last night called by nursing staff at 3:00 worsening renal failure stating patient had gone into retention with over 500 cc residual noted now unable to pass Lieberman some 4 hours later seen at the bedside with 25 Maori Lieberman catheter inserted left the bag drainage with clear urine daughter present patient apparently to be discharged home afraid of having Lieberman catheter at discharge in light of patient's dementia and chance of pulling it back into the bladder neck which gave him gross hematuria symptoms on last admission        Review of Systems:   Constitutional:  Negative for activity change, fever, chills and diaphoresis. HENT: Negative for hearing loss and trouble swallowing. Eyes: Negative for itching and visual disturbance. Respiratory: Negative for chest tightness and shortness of breath. Cardiovascular: Negative for chest pain, edema. Gastrointestinal: Negative for abdominal distention, na abdominal pain, constipation, diarrhea, Nausea and vomiting. Genitourinary: Negative for decreased urine volume, difficulty urinating, dysuria, enuresis, frequency, hematuria and urgency. Musculoskeletal: Negative for gait problem and myalgias. Neurological: Negative for dizziness and headaches. Hematological: Does not bruise/bleed easily.        Historical Information   Past Medical History:   Diagnosis Date    BPH (benign prostatic hyperplasia)     Chronic kidney disease     COPD (chronic obstructive pulmonary disease) (HCC)     Dementia (HCC)     Depression     Diabetes ([address])     DVT (deep venous thrombosis) ([facility])     Dysphagia     Gastrointestinal tube present (HCC)     GERD (gastroesophageal reflux disease)     Glaucoma     HTN (hypertension)     Indwelling Lieberman catheter present Parkinsons     Prostate cancer [facility])      Past Surgical History:   Procedure Laterality Date    IVC FILTER INSERTION      Per patient's Daughter/Caregiver Pao during inpatient admission    MASTECTOMY Left     OK CYSTO W/IRRIG & EVAC MULTPLE OBSTRUCTING CLOTS Bilateral 10/5/2023    Procedure: CYSTOSCOPY, TUR Bladder Neck contracture, TUR Bladder Tumor 4 CM TUMOR;  Surgeon: Malissa So MD;  Location: [facility];  Service: Urology     Social History   Social History     Substance and Sexual Activity   Alcohol Use Never     Social History     Substance and Sexual Activity   Drug Use Never     Social History     Tobacco Use   Smoking Status Never   Smokeless Tobacco Never     Family History: History reviewed. No pertinent family history.     Meds/Allergies   Medications Prior to Admission   Medication    acetaminophen (TYLENOL) 325 mg tablet    amLODIPine (NORVASC) 10 mg tablet    apixaban (ELIQUIS) 2.5 mg    apixaban (ELIQUIS) 5 mg    ascorbic acid (VITAMIN C) 250 MG tablet    budesonide (Pulmicort) 0.25 mg/2 mL nebulizer solution    carbidopa-levodopa (PARCOPA)  mg per disintegrating tablet    famotidine (PEPCID) 20 mg tablet    folic acid (KP Folic Acid) 1 mg tablet    hydrocortisone (ANUSOL-HC) 2.5 % rectal cream    ipratropium-albuterol (DUO-NEB) 0.5-2.5 mg/3 mL nebulizer solution    labetalol (NORMODYNE) 200 mg tablet    lamoTRIgine (LaMICtal) 25 mg tablet    latanoprost (XALATAN) 0.005 % ophthalmic solution    melatonin 3 mg    mirtazapine (REMERON) 7.5 MG tablet    nystatin (MYCOSTATIN) powder    polyethylene glycol (MIRALAX) 17 g packet    polyvinyl alcohol (LIQUIFILM TEARS) 1.4 % ophthalmic solution    saccharomyces boulardii (FLORASTOR) 250 mg capsule    Sennosides (senna) 8.8 mg/5 mL oral syrup    sertraline (ZOLOFT) 100 mg tablet    tamsulosin (FLOMAX) 0.4 mg     Allergies   Allergen Reactions    Lactose - Food Allergy GI Intolerance       Objective   Vitals: /66   Pulse 74   Temp 97.9 °F (36.6 °C)   Resp 16   Ht 6' 1" (1.854 m)   Wt 84.2 kg (185 lb 10 oz)   SpO2 98%   BMI 24.49 kg/m²     Physical Exam:  General Alert awake   Normocephalic atraumatic PERRLA  Lungs clear bilaterally  Cardiac normal S1 normal S2  Abdomen soft, flank pain  Bladder distended  Extremities no edema    I/O last 24 hours: In: 830 [NG/GT:830]  Out: 1 [Urine:1]    Invasive Devices       Peripheral Intravenous Line  Duration             Peripheral IV 10/17/23 Dorsal (posterior); Left Hand <1 day              Drain  Duration             Gastrostomy/Enterostomy  Umbilicus 17 days                        Lab Results: CBC:   Lab Results   Component Value Date    WBC 6.83 10/17/2023    HGB 8.9 (L) 10/17/2023    HCT 28.6 (L) 10/17/2023    MCV 87 10/17/2023     10/17/2023    RBC 3.29 (L) 10/17/2023    MCH 27.1 10/17/2023    MCHC 31.1 (L) 10/17/2023    RDW 16.7 (H) 10/17/2023    MPV 9.2 10/17/2023    NRBC 0 10/17/2023     CMP:   Lab Results   Component Value Date     (H) 10/17/2023    CO2 21 10/17/2023    BUN 35 (H) 10/17/2023    CREATININE 2.49 (H) 10/17/2023    CALCIUM 8.8 10/17/2023    AST 18 10/16/2023    ALT 3 (L) 10/16/2023    ALKPHOS 45 10/16/2023    EGFR 22 10/17/2023     Urinalysis:   Lab Results   Component Value Date    COLORU Alba 10/16/2023    CLARITYU Cloudy 10/16/2023    SPECGRAV 1.025 10/16/2023    PHUR 5.5 10/16/2023    LEUKOCYTESUR Large (A) 10/16/2023    NITRITE Negative 10/16/2023    GLUCOSEU Negative 10/16/2023    KETONESU Trace (A) 10/16/2023    BILIRUBINUR Small (A) 10/16/2023    BLOODU Large (A) 10/16/2023     Urine Culture:   Lab Results   Component Value Date    URINECX Culture results to follow.  10/16/2023     PSA:   Lab Results   Component Value Date    PSA 5.61 (H) 10/06/2023           Assessment/ Plan:  History of prostate cancer status post radical prostatectomy  Bladder neck contracture  Admitted for chest pain   Possible gastrotomy infection  Worsening renal failure  Urinary retention  Bladder neck contracture status post cystoscopy TUR BNC  Bladder lesions path confirming benign tissue  22 Divehi three-way Lieberman cath inserted with clear urine  Continue 22 Divehi catheter at this time can attempt voiding trial if patient's dementia clears to some extent may need chronic catheter though in fear patient might pull out  Awaiting urine cultures      Karon Rodriguez MD

## 2023-10-17 NOTE — ASSESSMENT & PLAN NOTE
Lab Results   Component Value Date    EGFR 27 10/19/2023    EGFR 24 10/18/2023    EGFR 22 10/17/2023    CREATININE 2.12 (H) 10/19/2023    CREATININE 2.38 (H) 10/18/2023    CREATININE 2.49 (H) 10/17/2023     Pt has A hx of Stage 3 CKD, follows up with [facility] prior to the last admission. His baseline Creatinine is around 2.0 and EFGR of 20.       Renal dose his medications  Avoid Nephrotoxic medications  Avoid hypotension  Trend Creatinine

## 2023-10-17 NOTE — CONSULTS
Consultation - [facility] 80 y.o. male MRN: [id number]  Unit/Bed#: [address] [place] [address] Encounter: [id number]    Assessment/Plan     Assessment:  Pressure ulcer of sacral region stage II  Type 2 diabetes mellitus without complication without long-term current use of insulin    Plan:  1. Sacrum: 3 separate small stage II pressure ulcers present in sacral region. Wounds POA. Entire area measures 3 x 3 x 0.1 cm. All 3 wound beds are 100% pink granulation tissue present. Wound edges flat and intact. Please cleanse wound with soap and water, pat dry, apply Triad paste to all 3 wounds twice daily and as needed    2. Patient has a healed pressure ulcer on his right hip. No wound present. Please continue to keep covered with an Allevyn silicone bordered foam dressing and xavier "P" for prevention. Dressing is to be changed every other day and may be peeled back and reapply daily for skin assessment. 3.  Patient's bilateral heels are boggy but intact. 4.  A1C results reviewed with the patient today. Patient maintaining tight glycemic control    5. We will place preventative wound care orders    History of Present Illness   HPI:  Kelsie Gaytan is a 80 y.o. male with a past medical history of prostate cancer s/p radical prostatectomy, breast cancer s/p mastectomy, IVC filter, CKD, HTN, dementia, Parkinson's, and depression who is admitted yesterday for chest pain and shortness of breath. Wound care was consulted for stage II pressure ulcers of his sacrum which were POA. Patient's daughter who is present with patient today reports she is his primary caregiver. She has been managing his wounds at home with Triad paste which was recommended by his VNA agency. Patient also has a history of a pressure ulcer of his right hip which is currently healed. Consults    Review of Systems   Constitutional: Negative. HENT:  Negative for ear pain and hearing loss. Eyes:  Negative for pain. Respiratory:  Negative for chest tightness and shortness of breath. Cardiovascular:  Negative for chest pain, palpitations and leg swelling. Gastrointestinal:  Negative for diarrhea, nausea and vomiting. Genitourinary:  Negative for dysuria. Musculoskeletal:  Positive for gait problem. Skin:  Positive for wound. Neurological:  Negative for tremors and weakness. Psychiatric/Behavioral:  Negative for behavioral problems, confusion and suicidal ideas. Historical Information   Past Medical History:   Diagnosis Date    BPH (benign prostatic hyperplasia)     Chronic kidney disease     COPD (chronic obstructive pulmonary disease) (HCC)     Dementia (HCC)     Depression     Diabetes (HCC)     DVT (deep venous thrombosis) (HCC)     Dysphagia     Gastrointestinal tube present (HCC)     GERD (gastroesophageal reflux disease)     Glaucoma     HTN (hypertension)     Indwelling Lieberman catheter present     Parkinsons     Prostate cancer ([address])      Past Surgical History:   Procedure Laterality Date    IVC FILTER INSERTION      Per patient's Daughter/Caregiver Pao during inpatient admission    MASTECTOMY Left     LA CYSTO W/IRRIG & EVAC MULTPLE OBSTRUCTING CLOTS Bilateral 10/5/2023    Procedure: CYSTOSCOPY, TUR Bladder Neck contracture, TUR Bladder Tumor 4 CM TUMOR;  Surgeon: Irina Heart MD;  Location: [facility];  Service: Urology     Social History   Social History     Substance and Sexual Activity   Alcohol Use Never     Social History     Substance and Sexual Activity   Drug Use Never     E-Cigarette/Vaping    E-Cigarette Use Never User      E-Cigarette/Vaping Substances    Nicotine No     THC No     CBD No     Flavoring No     Other No     Unknown No      Social History     Tobacco Use   Smoking Status Never   Smokeless Tobacco Never     Family History: History reviewed. No pertinent family history.     Meds/Allergies   all current active meds have been reviewed  Allergies   Allergen Reactions Lactose - Food Allergy GI Intolerance       Objective   Vitals: Blood pressure 121/70, pulse 65, temperature 97.9 °F (36.6 °C), resp. rate 16, height 6' 1" (1.854 m), weight 84.2 kg (185 lb 10 oz), SpO2 99 %. Physical Exam  Vitals and nursing note reviewed. Constitutional:       General: He is not in acute distress. Appearance: Normal appearance. He is normal weight. He is ill-appearing. HENT:      Head: Normocephalic and atraumatic. Eyes:      General:         Right eye: No discharge. Left eye: No discharge. Pulmonary:      Effort: Pulmonary effort is normal. No respiratory distress. Musculoskeletal:      Cervical back: Normal range of motion and neck supple. No rigidity. Right lower leg: No edema. Left lower leg: No edema. Skin:     General: Skin is warm and dry. Findings: Wound present. No erythema. Neurological:      General: No focal deficit present. Mental Status: He is alert. Mental status is at baseline. Psychiatric:      Comments: Patient nonverbal           Lab Results: I have personally reviewed pertinent reports. Imaging: I have personally reviewed pertinent reports. EKG, Pathology, and Other Studies: I have personally reviewed pertinent reports. Code Status: Level 3 - DNAR and DNI      Counseling / Coordination of Care  Total floor / unit time spent today 15 minutes. Greater than 50% of total time was spent with the patient and / or family counseling and / or coordination of care. A description of the counseling / coordination of care: Wound care plan of care.

## 2023-10-17 NOTE — CASE MANAGEMENT
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 ([address]) 10/06/2023    Chronic deep vein thrombosis (DVT) of femoral vein of both lower extremities ([address]) 10/06/2023    Functional quadriplegia ([address]) 10/02/2023    Gross hematuria 2023    Type 2 diabetes mellitus without complication, without long-term current use of insulin ([address]) 2023    Encounter for general adult medical examination with abnormal findings 2023    Catheter-associated urinary tract infection  2023    PD (Parkinson's disease) 2023    Hypertension 2023    Chronic kidney disease 2023    Severe dementia ([address]) 2023    Dysphagia 2023    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[address]) 2023    Bedbound 2023    Pressure injury of skin of right hip 2023    Skin ulcer of sacrum ([address]) 2023    H/O left mastectomy 2023    History of prostate cancer 2023    Arm DVT (deep venous thromboembolism), acute, left ([address]) 2023    Indwelling Lieberman catheter present 2023    Chronic obstructive pulmonary disease ([address]) 2023    Constipation 2023    Gastroesophageal reflux disease 2023    Glaucoma of both eyes 2023    Insomnia 2023    Fungal rash of torso 2023    Dry eyes 2023    Depression 2023    Benign prostatic hyperplasia 2023    Gram-positive bacteremia     Acute metabolic encephalopathy     Hypernatremia 2023 Malignant neoplasm of central portion of left male breast ([address]) 07/04/2023    UTI (urinary tract infection) due to urinary indwelling Lieberman catheter  07/04/2023      LOS (days): 1  Geometric Mean LOS (GMLOS) (days): 3.60  Days to GMLOS:2.6     OBJECTIVE:  PATIENT READMITTED TO HOSPITAL  Risk of Unplanned Readmission Score: 31.98       Current admission status: Inpatient  Referral Reason: Other (Discharge planning)    Preferred Pharmacy:   [facility], [address]  Phone: [phone] Fax: [phone]    Primary Care Provider: ROSITA Monae    Primary Insurance: MEDICARE  Secondary Insurance: BLUE CROSS    ASSESSMENT:  [address], [address] - Daughter   Primary Phone: [phone] (Mobile)                  Readmission Root Cause  30 Day Readmission: Yes  Who directed you to return to the hospital?: Family  Did you understand whom to contact if you had questions or problems?: Yes  Did you get your prescriptions before you left the hospital?: No  Reason[de-identified] Not preferred pharmacy  Were you able to get your prescriptions filled when you left the hospital?: Yes  Did you take your medications as prescribed?: Yes  Were you able to get to your follow-up appointments?: Yes  During previous admission, was a post-acute recommendation made?: Yes  What post-acute resources were offered?: STR, Offered, but declined (Patient was discharged to home with [address] per daughter's request)  Patient was readmitted due to: Chest pain  Action Plan: Medical management, return home per daughter's preference with [facility] with [facility]    Patient Information  Admitted from[de-identified] Home  Mental Status: Confused  During Assessment patient was accompanied by: Daughter  Assessment information provided by[de-identified] Daughter  Primary Caregiver: Family  Caregiver's Name[de-identified] Vu Ramirez (daughter)  Caregiver's Relationship to Patient[de-identified] Family Member  Caregiver's Telephone Number[de-identified] [phone]  Support Systems: Daughter, Family members  [place] of Residence: [address] [place] [address] do you live in?: [address] entry access options. Select all that apply.: Stairs  Number of steps to enter home.: 1  Type of Current Residence: 2 story home  Upon entering residence, is there a bedroom on the main floor (no further steps)?: Yes  Upon entering residence, is there a bathroom on the main floor (no further steps)?: Yes  In the last 12 months, was there a time when you were not able to pay the mortgage or rent on time?: No  In the last 12 months, how many places have you lived?: 2 (previously resided in [place] in [place], daughter moved patient into her home about 2 weeks ago)  In the last 12 months, was there a time when you did not have a steady place to sleep or slept in a shelter (including now)?: No  Homeless/housing insecurity resource given?: N/A  Living Arrangements: Lives w/ Daughter    Activities of Daily Living Prior to Admission  Functional Status: Total dependent  Completes ADLs independently?: No  Level of ADL dependence: Total Dependent  Ambulates independently?: No  Level of ambulatory dependence:  Total Dependent  Does patient use assisted devices?: Yes  Assisted Devices (DME) used: Hospital Bed, Wheelchair ([place] chair, tube feeds)  Does patient currently own DME?: Yes  What DME does the patient currently own?: Hospital Bed, Wheelchair, Other (Comment) [facility] chair, tube feeds)  Does the patient have a history of Short-Term Rehab?: Yes  Does patient have a history of HHC?: Yes  Does patient currently have [facility]?: Yes    Current Home Health Care  Type of Current Home Care Services: Nurse visit  [address][de-identified] Kin Provider[de-identified] PCP    Patient Information Continued  Income Source: Pension/long-term  Does patient have prescription coverage?: Yes  Within the past 12 months, you worried that your food would run out before you got the money to buy more.: Never true  Within the past 12 months, the food you bought just didn't last and you didn't have money to get more.: Never true  Food insecurity resource given?: N/A  Does patient receive dialysis treatments?: No    Means of Transportation  Means of Transport to Appts[de-identified] Other (Comment) (medical transport to/from hospital, receives home visits from [facility])  In the past 12 months, has lack of transportation kept you from medical appointments or from getting medications?: No  In the past 12 months, has lack of transportation kept you from meetings, work, or from getting things needed for daily living?: No  Was application for public transport provided?: N/A      DISCHARGE DETAILS:    Discharge planning discussed with[de-identified] Whit Lee (daughter)  Freedom of Choice: Yes    Comments - Freedom of Choice: SW spoke with daughter Mary Ellen Warner at bedside to review role of CM, conduct assessment and discuss discharge planning. Patient had previously resided in a SNF in [place] before Mary Ellen Warner brought him to her home in [place] so that she could care for him. Mary Ellengriselda Warner stated that she has spoken with several local SNFs but patient's resource level and income are barriers to [facility] eligibility. Mary Ellen Warner is also considering goals of care and would prefer to care for patient at home at this time. She stated that patient was on palliative care while in the SNF in [facility] and she is open to considering palliative or hospice depending on his medical progress and discussions with medical team.      Patient has a hospital bed, Kelly Tobias chair and tube feeding supplies at home. Mary Ellenfredy Warner noted that a part is missing from the TF pump and she is needing to do bolus feeding rather than continuous. JENSEN notified [employer] liaison Theresa Veras and he asked the enteral feeding liaison to contact Mary Ellen Warner to discuss. Pao later confirmed that she had heard from the liaison.   Pao also noted that she has not yet received training on the use of the [facility] smith Deanm chair and SW asked [address] to arrange this. Patient is currently active with [facility] and a referral for [facility] was placed. Agency has accepted patient and has been reserved in [address] Plan had been for discharge to home tomorrow but SW was then notified by attending that patient is not yet medically cleared. SW will arrange S transport to home when cleared.         CM contacted family/caregiver?: Yes  Were Treatment Team discharge recommendations reviewed with patient/caregiver?: Yes  Did patient/caregiver verbalize understanding of patient care needs?: Yes  Were patient/caregiver advised of the risks associated with not following Treatment Team discharge recommendations?: Yes    Contacts  Patient Contacts: Alisson Banerjee (daughter)  Relationship to Patient[de-identified] Family  Contact Method: Phone  Phone Number: [phone]  Reason/Outcome: Emergency Contact, Discharge Planning, Referral, Continuity of [address]         Is the patient interested in [facility] [address] at discharge?: Yes  [facility] requested[de-identified] [facility] Name[de-identified] [facility] of [facility] External Referral Reason (only applicable if external HHA name selected): Services not provided in network or near patient location  [facility] Provider[de-identified] PCP  Home Health Services Needed[de-identified] COPD Management, Wound/Ostomy Care  Homebound Criteria Met[de-identified] Requires Medical Transportation, Requires the Assistance of Another Person for Safe Ambulation or to Leave the Home, Uses an Assist Device (i.e. cane, walker, etc)  Supporting Clincal Findings[de-identified] Cognitive Deficit Requiring the Assistance of Others, Bed Bound or Wheelchair Bound    DME Referral Provided  Referral made for DME?: No    Other Referral/Resources/Interventions Provided:  Interventions: C    Would you like to participate in our [facility] service program?  : No - Declined    Treatment Team Recommendation: Home with [address]  Discharge Destination Plan[de-identified] Home with [facility] at Discharge : BLS Ambulance  Dispatcher Contacted: Yes  Number/Name of Dispatcher: [address] via [facility] [address] by [address] and Unit #):  SLETS  ETA of Transport (Date): 10/18/23  ETA of Transport (Time): 1030         IMM Given (Date):: 10/16/23

## 2023-10-17 NOTE — PROGRESS NOTES
Daily Progress Note - [facility] Medicine Residency  Alejandra Hyatt 80 y.o. male MRN: [id number]  Unit/Bed#: 913 [facility] 407-01 Encounter: [id number]  Admitting Physician: Anum Brenner MD   PCP: ROSITA Monae  Date of Admission:  10/16/2023 11:43 AM    Assessment and Plan    * Chest pain  Assessment & Plan  Patient 80year-old with PMH of Parkinson's, dementia, minimally verbal. According to the daughter, patient had chest pain, chest tightness and heavy breathing since today morning. She gave him 1 treatment of albuterol and Pulmicort but it did not improve. Patient has no cough, but was a little short of breath when he presented to the ED. When the daughter asked him where the pain was he was pointing to her chest.  No other complaints by the daughter, ROS was unobtainable due to minimally verbal patient. In the ED, patient pointed epigastric region as a source of pain. LA: 2.1<1.6, Trops: (0)21;(2)18;(4)18; Hgb 10.3; Procal 0.09  EKG: Sinus rhythm w/sinus arrhythmia with 1st degree A-V block, Qtc 411  CT A/B: No acute abdominal or pelvic pathology within the aforementioned limitations. PEG tube in place with no associated fluid collections. No bowel obstruction or convincing inflammation. Normal appendix. Urinary bladder obscured by streak artifact from the bilateral hip arthroplasties. No hydronephrosis. Minimal groundglass opacities are seen in the upper lobe centrally, which may be infectious. Continue to Monitor vital signs  Start Azithromycin 500mg Q24  Continue Duo-nebs Q8  Continue Pulmicort 0.25 twice daily  Covid negative  Consider Echo          Chronic UTI  Assessment & Plan  Pt is a 79 yo male w/dementia, is + UA for large leukocytes, large occult blood, small bilirubin, trace ketones and small bilirubin. According to pt's daughter he has not complained about difficulty urinating but had decreased frequency.  In the afternoon he would have increased residual. Bladder scan showed 25cc of urine. WBC 7.04, afebrile, no tachycardia, no tachypnea. Continue to Monitor vital signs  Urine culture pending  Monitor I and Os  Nephrology consulted, recs appreciated  No further urologic workup needed as this time  Consider Post-voidal bladder scan    Acute renal injury [facility])  Assessment & Plan    Lab Results   Component Value Date    EGFR 22 10/17/2023    EGFR 24 10/16/2023    EGFR 26 10/09/2023    CREATININE 2.49 (H) 10/17/2023    CREATININE 2.34 (H) 10/16/2023    CREATININE 2.20 (H) 10/09/2023     Pt has A hx of Stage 3 CKD, [place] [facility] [place]. His baseline Creatinine is around 2.0 and EFGR of 20. Renal dose his medications  Avoid Nephrotoxic medications  Avoid hypotension  Trend Creatinine    Chronic deep vein thrombosis (DVT) of femoral vein of both lower extremities (HCC)  Assessment & Plan  Pt has a hx of chronic DVTs w/IVC filter in place. During last admission he was found to have Acute right lower extremity DVT in EIV and CFV.  Pt was started Eliquis loading dose of 5 mg BID for 7 days and than 2.5 mg BID    Continue patient's Eliquis 2.5mg BID  Monitor signs for any new swelling on lower extremities    Depression  Assessment & Plan  Chronic, stable  Continue home medications: Mirtazapine 7.5 mg daily at bedtime, Zoloft 100 mg per G-tube daily    Dry eyes  Assessment & Plan  Chronic, stable  Continue artificial tears 1 drop in each eye 3 times daily as needed    Gastroesophageal reflux disease  Assessment & Plan  Chronic, stable  Continue home medication Pepcid 20 mg every other day    Constipation  Assessment & Plan  Chronic, stable  Continue home meds MiraLAX, senna per G-tube    Chronic obstructive pulmonary disease (HCC)  Assessment & Plan  Chronic, stable    Patient has a past medical history of COPD, stable on his home medications  Continue Pulmicort 0.25 twice daily  Continue DuoNebs 3 times daily    Dysphagia  Assessment & Plan  On prior admission patient was unable to swallow his medications and had a risk for aspiration this was placed on G tube. Patient receives all of his enteric feeds and medications via the G-tube. Patient's creatinine is 2.34. Check gastrotomy tube daily  Feeds consist of Glucerna 1.2, 85 ml/h continuous with water boluses every 4 hours with 100ml flush  Increase in water bolus due to creatinine trending up    Severe dementia [facility])  Assessment & Plan  Chronic,  Patient has a history of progressive dementia, secondary to Parkinson's disease. Hypertension  Assessment & Plan  Chronic, stable  BP 10/16: 150/71    Continue home medications Labetalol 200mg Q8  Continue amlodipine 10mg  Monitor blood pressure    PD (Parkinson's disease)  Assessment & Plan  Chronic, stable    Pt is minimally verbal, and will sometimes answer questions. Continue carbidopa levodopa  mg 3 times daily per G-tube  Continue lamotrigine 25 mg twice daily per G-tube        VTE Pharmacologic Prophylaxis: VTE Score: 15 High Risk (Score >/= 5) - Pharmacological DVT Prophylaxis Ordered: apixaban (Eliquis). Sequential Compression Devices Ordered. Patient Centered Rounds: I have performed bedside rounds with nursing staff today. Discussions with Specialists or Other Care Team Provider: Urology    Education and Discussions with Family / Patient:   Patient Information Sharing: With the consent of Rosy Guidry , their loved ones Giselle Marrero) were notified today by inpatient team of the patient’s condition and current plan. All questions answered. Time Spent for Care: 30 minutes. More than 50% of total time spent on counseling and coordination of care as described above.     Current Length of Stay: 1 day(s)    Current Patient Status: Inpatient   Certification Statement: The patient will continue to require additional inpatient hospital stay due to UTI    Discharge Plan: Home    Code Status: Level 3 - DNAR and DNI    Subjective:   Pt seen and examined at bedside. Pt is minimally verbal, complained of chest pain. When asked where his pain is he pointed to the middle of his chest.  According, to the nurse the patient slept well through the night. No other acute complaints at this time. Objective     Objective:   Vitals:   Temp (24hrs), Av.3 °F (36.8 °C), Min:98.2 °F (36.8 °C), Max:98.5 °F (36.9 °C)    Temp:  [98.2 °F (36.8 °C)-98.5 °F (36.9 °C)] 98.2 °F (36.8 °C)  HR:  [64-77] 70  Resp:  [12-24] 18  BP: ()/() 127/53  SpO2:  [91 %-100 %] 98 %  Body mass index is 24.49 kg/m². Input and Output Summary (last 24 hours): Intake/Output Summary (Last 24 hours) at 10/17/2023 0642  Last data filed at 10/16/2023 1611  Gross per 24 hour   Intake 1000 ml   Output 50 ml   Net 950 ml       Physical Exam:   Physical Exam  Constitutional:       Appearance: Normal appearance. HENT:      Mouth/Throat:      Mouth: Mucous membranes are dry. Eyes:      Comments: Dry eyes   Cardiovascular:      Rate and Rhythm: Normal rate and regular rhythm. Pulses: Normal pulses. Heart sounds: Normal heart sounds. Pulmonary:      Effort: No respiratory distress. Breath sounds: No stridor. Comments: Difficult to hear breathe sounds due to contracture  Abdominal:      General: Abdomen is flat. There is no distension. Palpations: Abdomen is soft. There is no mass. Comments: G tube in place, no edema or tenderness   Musculoskeletal:         General: No swelling or tenderness. Right lower leg: No edema. Left lower leg: No edema. Comments: Quadriplegia   Skin:     General: Skin is warm. Capillary Refill: Capillary refill takes less than 2 seconds. Neurological:      General: No focal deficit present. Mental Status: He is disoriented.       Comments: Oriented to self           Additional Data:     Labs:  Results from last 7 days   Lab Units 10/17/23  0430   WBC Thousand/uL 6.83   HEMOGLOBIN g/dL 8.9*   HEMATOCRIT % 28.6*   PLATELETS Thousands/uL 307   NEUTROS PCT % 55   LYMPHS PCT % 29   MONOS PCT % 10   EOS PCT % 5     Results from last 7 days   Lab Units 10/17/23  0430 10/16/23  1207   POTASSIUM mmol/L 4.1 4.4   CHLORIDE mmol/L 109* 105   CO2 mmol/L 21 24   BUN mg/dL 35* 32*   CREATININE mg/dL 2.49* 2.34*   CALCIUM mg/dL 8.8 9.0   ALK PHOS U/L  --  45   ALT U/L  --  3*   AST U/L  --  18                   * I Have Reviewed All Lab Data Listed Above. * Additional Pertinent Lab Tests Reviewed:  [address] Admission Reviewed    Imaging:    Imaging Reports Reviewed Today Include: yes  Imaging Personally Reviewed by Myself Includes:  yes    Recent Cultures (last 7 days):           Last 24 Hours Medication List:   Current Facility-Administered Medications   Medication Dose Route Frequency Provider Last Rate    amLODIPine  10 mg Per G Tube Daily Jcarlos Wilson MD      apixaban  2.5 mg Per G Tube BID Srinivasa Felder MD      ascorbic acid  250 mg Per G Tube Daily Jcarlos Wilson MD      azithromycin  500 mg Intravenous Q24H Jcarlos Wilson MD      budesonide  0.25 mg Nebulization BID Jcarlos Wilson MD      carbidopa-levodopa  1 tablet Per G Tube TID Srinivasa Felder MD      famotidine  20 mg Per G Tube Every Other Kimberly Burgos MD      folic acid  1 mg Per G Tube Daily Jcarlos Wilson MD      hydroxypropyl methylcellulose  1 drop Both Eyes TID Jcarlos Wilson MD      ipratropium-albuterol  3 mL Nebulization TID Jcarlos Wilson MD      labetalol  200 mg Per Enedina MD Nathan      lamoTRIgine  25 mg Per G Tube BID Jcarlos Wilson MD      latanoprost  1 drop Both Eyes HS Jcarlos Wilson MD      mirtazapine  15 mg Per G Tube HS Srinivasa Felder MD      nystatin   Topical TID Jcarlos Wilson MD      oxybutynin  5 mg Per G Tube TID Srinivasa Felder MD      polyethylene glycol  17 g Per PEG Tube Daily Jcarlos Wilson MD      saccharomyces boulardii  250 mg Per G Tube BID Panfilo Cameron MD      senna  8.8 mg Per PEG Tube HS Panfilo Cameron MD      sertraline  100 mg Per G Tube Daily Panfilo Cameron MD               ** Please Note: Dictation voice to text software may have been used in the creation of this document.  Panfilo Cameron MD  10/17/23  6:42 AM

## 2023-10-17 NOTE — PROGRESS NOTES
Recommend Glucerna 1.2 at goal rate of 75 mL/hr for a total volume fo 1800 mL. This will provide 2160 kcals 108 gms Protein (1.3 gms/kg), 1449 mL free water. Recommend flushes of 120 mL q 4 hrs for a total fluid intake of 2169 mL.

## 2023-10-18 ENCOUNTER — PATIENT OUTREACH (OUTPATIENT)
Dept: FAMILY MEDICINE CLINIC | Facility: CLINIC | Age: 83
End: 2023-10-18

## 2023-10-18 LAB
ANION GAP SERPL CALCULATED.3IONS-SCNC: 9 MMOL/L
BACTERIA UR CULT: ABNORMAL
BASOPHILS # BLD AUTO: 0.07 THOUSANDS/ÂΜL (ref 0–0.1)
BASOPHILS NFR BLD AUTO: 1 % (ref 0–1)
BUN SERPL-MCNC: 35 MG/DL (ref 5–25)
CALCIUM SERPL-MCNC: 8.6 MG/DL (ref 8.4–10.2)
CHLORIDE SERPL-SCNC: 110 MMOL/L (ref 96–108)
CO2 SERPL-SCNC: 19 MMOL/L (ref 21–32)
CREAT SERPL-MCNC: 2.38 MG/DL (ref 0.6–1.3)
EOSINOPHIL # BLD AUTO: 0.43 THOUSAND/ÂΜL (ref 0–0.61)
EOSINOPHIL NFR BLD AUTO: 6 % (ref 0–6)
ERYTHROCYTE [DISTWIDTH] IN BLOOD BY AUTOMATED COUNT: 16.7 % (ref 11.6–15.1)
GFR SERPL CREATININE-BSD FRML MDRD: 24 ML/MIN/1.73SQ M
GLUCOSE SERPL-MCNC: 128 MG/DL (ref 65–140)
HCT VFR BLD AUTO: 27.8 % (ref 36.5–49.3)
HGB BLD-MCNC: 8.9 G/DL (ref 12–17)
IMM GRANULOCYTES # BLD AUTO: 0.01 THOUSAND/UL (ref 0–0.2)
IMM GRANULOCYTES NFR BLD AUTO: 0 % (ref 0–2)
LYMPHOCYTES # BLD AUTO: 1.85 THOUSANDS/ÂΜL (ref 0.6–4.47)
LYMPHOCYTES NFR BLD AUTO: 27 % (ref 14–44)
MCH RBC QN AUTO: 28 PG (ref 26.8–34.3)
MCHC RBC AUTO-ENTMCNC: 32 G/DL (ref 31.4–37.4)
MCV RBC AUTO: 87 FL (ref 82–98)
MONOCYTES # BLD AUTO: 0.74 THOUSAND/ÂΜL (ref 0.17–1.22)
MONOCYTES NFR BLD AUTO: 11 % (ref 4–12)
NEUTROPHILS # BLD AUTO: 3.89 THOUSANDS/ÂΜL (ref 1.85–7.62)
NEUTS SEG NFR BLD AUTO: 55 % (ref 43–75)
NRBC BLD AUTO-RTO: 0 /100 WBCS
PLATELET # BLD AUTO: 268 THOUSANDS/UL (ref 149–390)
PMV BLD AUTO: 8.7 FL (ref 8.9–12.7)
POTASSIUM SERPL-SCNC: 4.2 MMOL/L (ref 3.5–5.3)
RBC # BLD AUTO: 3.18 MILLION/UL (ref 3.88–5.62)
SODIUM SERPL-SCNC: 138 MMOL/L (ref 135–147)
WBC # BLD AUTO: 6.99 THOUSAND/UL (ref 4.31–10.16)

## 2023-10-18 PROCEDURE — 94640 AIRWAY INHALATION TREATMENT: CPT

## 2023-10-18 PROCEDURE — 85025 COMPLETE CBC W/AUTO DIFF WBC: CPT

## 2023-10-18 PROCEDURE — 99232 SBSQ HOSP IP/OBS MODERATE 35: CPT | Performed by: FAMILY MEDICINE

## 2023-10-18 PROCEDURE — 80048 BASIC METABOLIC PNL TOTAL CA: CPT

## 2023-10-18 PROCEDURE — 94760 N-INVAS EAR/PLS OXIMETRY 1: CPT

## 2023-10-18 RX ADMIN — Medication 250 MG: at 09:40

## 2023-10-18 RX ADMIN — NYSTATIN: 100000 POWDER TOPICAL at 15:40

## 2023-10-18 RX ADMIN — IPRATROPIUM BROMIDE AND ALBUTEROL SULFATE 3 ML: 2.5; .5 SOLUTION RESPIRATORY (INHALATION) at 07:18

## 2023-10-18 RX ADMIN — OXYBUTYNIN CHLORIDE 5 MG: 5 TABLET ORAL at 15:40

## 2023-10-18 RX ADMIN — CARBIDOPA AND LEVODOPA 1 TABLET: 25; 100 TABLET ORAL at 21:35

## 2023-10-18 RX ADMIN — FOLIC ACID 1 MG: 1 TABLET ORAL at 09:40

## 2023-10-18 RX ADMIN — APIXABAN 2.5 MG: 2.5 TABLET, FILM COATED ORAL at 17:52

## 2023-10-18 RX ADMIN — NYSTATIN: 100000 POWDER TOPICAL at 21:35

## 2023-10-18 RX ADMIN — LABETALOL HYDROCHLORIDE 200 MG: 100 TABLET, FILM COATED ORAL at 15:40

## 2023-10-18 RX ADMIN — OXYBUTYNIN CHLORIDE 5 MG: 5 TABLET ORAL at 21:35

## 2023-10-18 RX ADMIN — LATANOPROST 1 DROP: 50 SOLUTION OPHTHALMIC at 21:35

## 2023-10-18 RX ADMIN — LAMOTRIGINE 25 MG: 25 TABLET ORAL at 09:40

## 2023-10-18 RX ADMIN — LAMOTRIGINE 25 MG: 25 TABLET ORAL at 17:52

## 2023-10-18 RX ADMIN — FAMOTIDINE 20 MG: 20 TABLET ORAL at 09:40

## 2023-10-18 RX ADMIN — IPRATROPIUM BROMIDE AND ALBUTEROL SULFATE 3 ML: 2.5; .5 SOLUTION RESPIRATORY (INHALATION) at 13:22

## 2023-10-18 RX ADMIN — NYSTATIN: 100000 POWDER TOPICAL at 09:40

## 2023-10-18 RX ADMIN — LABETALOL HYDROCHLORIDE 200 MG: 100 TABLET, FILM COATED ORAL at 21:35

## 2023-10-18 RX ADMIN — MIRTAZAPINE 15 MG: 15 TABLET, FILM COATED ORAL at 21:35

## 2023-10-18 RX ADMIN — LABETALOL HYDROCHLORIDE 200 MG: 100 TABLET, FILM COATED ORAL at 05:11

## 2023-10-18 RX ADMIN — GLYCERIN 1 DROP: .002; .002; .01 SOLUTION/ DROPS OPHTHALMIC at 09:40

## 2023-10-18 RX ADMIN — AMLODIPINE BESYLATE 10 MG: 10 TABLET ORAL at 09:40

## 2023-10-18 RX ADMIN — CARBIDOPA AND LEVODOPA 1 TABLET: 25; 100 TABLET ORAL at 09:40

## 2023-10-18 RX ADMIN — Medication 250 MG: at 17:52

## 2023-10-18 RX ADMIN — SERTRALINE 100 MG: 100 TABLET, FILM COATED ORAL at 09:40

## 2023-10-18 RX ADMIN — IPRATROPIUM BROMIDE AND ALBUTEROL SULFATE 3 ML: 2.5; .5 SOLUTION RESPIRATORY (INHALATION) at 19:21

## 2023-10-18 RX ADMIN — CARBIDOPA AND LEVODOPA 1 TABLET: 25; 100 TABLET ORAL at 15:40

## 2023-10-18 RX ADMIN — BUDESONIDE 0.25 MG: 0.25 INHALANT ORAL at 07:18

## 2023-10-18 RX ADMIN — SENNOSIDES 8.8 MG: 8.8 SYRUP ORAL at 21:35

## 2023-10-18 RX ADMIN — BUDESONIDE 0.25 MG: 0.25 INHALANT ORAL at 19:23

## 2023-10-18 RX ADMIN — AZITHROMYCIN MONOHYDRATE 500 MG: 500 INJECTION, POWDER, LYOPHILIZED, FOR SOLUTION INTRAVENOUS at 17:53

## 2023-10-18 RX ADMIN — OXYBUTYNIN CHLORIDE 5 MG: 5 TABLET ORAL at 09:40

## 2023-10-18 NOTE — PROGRESS NOTES
Daily Progress Note - [facility] Medicine Residency  Alex Chan 80 y.o. male MRN: [id number]  Unit/Bed#: 913 [facility] 407-01 Encounter: [id number]  Admitting Physician: Yolanda Rivas MD   PCP: ROSITA Monae  Date of Admission:  10/16/2023 11:43 AM    Assessment and Plan    * Chest pain  Assessment & Plan  Patient 80year-old with PMH of Parkinson's, dementia, minimally verbal. According to the daughter, patient had chest pain, chest tightness and heavy breathing since today morning. She gave him 1 treatment of albuterol and Pulmicort but it did not improve. Patient has no cough, but was a little short of breath when he presented to the ED. When the daughter asked him where the pain was he was pointing to her chest.  No other complaints by the daughter, ROS was unobtainable due to minimally verbal patient. In the ED, patient pointed epigastric region as a source of pain. LA: 2.1<1.6, Trops: (0)21;(2)18;(4)18; Hgb 10.3; Procal 0.09  EKG: Sinus rhythm w/sinus arrhythmia with 1st degree A-V block, Qtc 411  CT A/B: No acute abdominal or pelvic pathology within the aforementioned limitations. PEG tube in place with no associated fluid collections. No bowel obstruction or convincing inflammation. Normal appendix. Urinary bladder obscured by streak artifact from the bilateral hip arthroplasties. No hydronephrosis. Minimal groundglass opacities are seen in the upper lobe centrally, which may be infectious. Echo: EF 60%, mild to moderate concentric hypertrophy. Normal systolic/diastolic heart function    Continue to Monitor vital signs  Start Azithromycin 500mg Q24  Continue Duo-nebs Q8  Continue Pulmicort 0.25 twice daily  Covid negative  Consider Random Trops if Chest pain again      Chronic UTI  Assessment & Plan  Pt is a 79 yo male w/dementia, is + UA for large leukocytes, large occult blood, small bilirubin, trace ketones and small bilirubin.   On Admission, according to pt's daughter he has not complained about difficulty urinating but had decreased frequency. In the afternoon he would have increased residual. Bladder scan showed 25cc of urine. WBC 7.04, afebrile, no tachycardia, no tachypnea. 10/17:Pt has bloody urine and decreased urine output on 10/17. Post voidal scan showed 580ml, 22f Lieberman placed    Continue to Monitor vital signs  Urine culture pending  Monitor I and Os  Nephrology consulted, recs appreciated  Attempt voiding trail if pt's dementia clears  Eliquis held    Chronic deep vein thrombosis (DVT) of femoral vein of both lower extremities ([address])  Assessment & Plan  Pt has a hx of chronic DVTs w/IVC filter in place. During last admission he was found to have Acute right lower extremity DVT in EIV and CFV. Pt was started Eliquis loading dose of 5 mg BID for 7 days and than 2.5 mg BID    Hold patient's Eliquis 2.5mg BID  Monitor signs for any new swelling on lower extremities    Stage 3 chronic kidney disease [facility])  Assessment & Plan  Lab Results   Component Value Date    EGFR 22 10/17/2023    EGFR 24 10/16/2023    EGFR 26 10/09/2023    CREATININE 2.49 (H) 10/17/2023    CREATININE 2.34 (H) 10/16/2023    CREATININE 2.20 (H) 10/09/2023     Pt has A hx of Stage 3 CKD, follows up with [facility] prior to the last admission. His baseline Creatinine is around 2.0 and EFGR of 20. Renal dose his medications  Avoid Nephrotoxic medications  Avoid hypotension  Trend Creatinine    Functional quadriplegia (HCC)  Assessment & Plan  Pt has a chronic hx of parkinson's and functional quadriplegia secondary to severe dementia and Parkinson's a/e/b bedbound. Patient requires assistance with all of his ADLs. Contractures to be treated with increased assistance and total care. He resides with daughter who provides complete care.      Continue frequent repositioning   Check feeding tubes insertion site to make sure the patient is not pulling on that    Depression  Assessment & Plan  Chronic, stable  Continue home medications: Mirtazapine 7.5 mg daily at bedtime, Zoloft 100 mg per G-tube daily    Dry eyes  Assessment & Plan  Chronic, stable  Continue artificial tears 1 drop in each eye 3 times daily as needed    Gastroesophageal reflux disease  Assessment & Plan  Chronic, stable  Continue home medication Pepcid 20 mg every other day    Constipation  Assessment & Plan  Chronic, stable  Continue home meds MiraLAX, senna per G-tube    Chronic obstructive pulmonary disease (HCC)  Assessment & Plan  Chronic, stable    Patient has a past medical history of COPD, stable on his home medications  Continue Pulmicort 0.25 twice daily  Continue DuoNebs 3 times daily    Dysphagia  Assessment & Plan  On prior admission patient was unable to swallow his medications and had a risk for aspiration this was placed on G tube. Patient receives all of his enteric feeds and medications via the G-tube. Patient's creatinine is 2.34. Check gastrotomy tube daily  Feeds consist of Glucerna 1.2, 75 ml/h continuous with water boluses every 4 hours with 120ml flush  Increase in water bolus due to creatinine trending up  Soft restraints, to avoid pulling out the G tube    Severe dementia [facility])  Assessment & Plan  Chronic,  Patient has a history of progressive dementia, secondary to Parkinson's disease. Soft restraints, to avoid pulling out the G tube and Lieberman        Hypertension  Assessment & Plan  Chronic, stable  BP 10/16: 150/71    Continue home medications Labetalol 200mg Q8  Continue amlodipine 10mg  Monitor blood pressure    PD (Parkinson's disease)  Assessment & Plan  Chronic, stable    Pt is minimally verbal, and will sometimes answer questions. Continue carbidopa levodopa  mg 3 times daily per G-tube  Continue lamotrigine 25 mg twice daily per G-tube        VTE Pharmacologic Prophylaxis: VTE Score: 15 High Risk (Score >/= 5) - Pharmacological DVT Prophylaxis Contraindicated.  Sequential Compression Devices Ordered. Patient Centered Rounds: I have performed bedside rounds with nursing staff today. Discussions with Specialists or Other Care Team Provider: Urology    Education and Discussions with Family / Patient:   Patient Information Sharing: With the consent of Olivia Live , their loved ones, Kevin Fleming, were notified today by inpatient team of the patient’s condition and current plan. All questions answered. Time Spent for Care: 30 minutes. More than 50% of total time spent on counseling and coordination of care as described above. Current Length of Stay: 2 day(s)    Current Patient Status: Inpatient   Certification Statement: The patient will continue to require additional inpatient hospital stay due to pending Urine cultures    Discharge Plan: Home    Code Status: Level 3 - DNAR and DNI    Subjective:   Patient seen and examined at bedside. According to the nurse, patient slept well through the night. Unable to ask any more questions due to patient being nonverbal.      Objective     Objective:   Vitals:   Temp (24hrs), Av.4 °F (36.9 °C), Min:97.9 °F (36.6 °C), Max:98.8 °F (37.1 °C)    Temp:  [97.9 °F (36.6 °C)-98.8 °F (37.1 °C)] 98.8 °F (37.1 °C)  HR:  [63-74] 72  Resp:  [16-22] 22  BP: (112-134)/(50-70) 134/56  SpO2:  [97 %-100 %] 97 %  Body mass index is 24.49 kg/m². Input and Output Summary (last 24 hours): Intake/Output Summary (Last 24 hours) at 10/18/2023 0647  Last data filed at 10/17/2023 1849  Gross per 24 hour   Intake 830 ml   Output --   Net 830 ml       Physical Exam:   Physical Exam  Constitutional:       Appearance: Normal appearance. Cardiovascular:      Rate and Rhythm: Normal rate and regular rhythm. Pulses: Normal pulses. Heart sounds: Normal heart sounds. Pulmonary:      Effort: Pulmonary effort is normal.      Breath sounds: Normal breath sounds. Abdominal:      General: Abdomen is flat.       Comments: G tube in place, no edema or tenderness   Genitourinary:     Comments: 22f Lieberman in place  Musculoskeletal:         General: No swelling. Skin:     General: Skin is warm. Capillary Refill: Capillary refill takes less than 2 seconds. Neurological:      Mental Status: Mental status is at baseline. He is disoriented. Psychiatric:         Behavior: Behavior normal.           Additional Data:     Labs:  Results from last 7 days   Lab Units 10/18/23  0413   WBC Thousand/uL 6.99   HEMOGLOBIN g/dL 8.9*   HEMATOCRIT % 27.8*   PLATELETS Thousands/uL 268   NEUTROS PCT % 55   LYMPHS PCT % 27   MONOS PCT % 11   EOS PCT % 6     Results from last 7 days   Lab Units 10/18/23  0413 10/17/23  0430 10/16/23  1207   POTASSIUM mmol/L 4.2   < > 4.4   CHLORIDE mmol/L 110*   < > 105   CO2 mmol/L 19*   < > 24   BUN mg/dL 35*   < > 32*   CREATININE mg/dL 2.38*   < > 2.34*   CALCIUM mg/dL 8.6   < > 9.0   ALK PHOS U/L  --   --  45   ALT U/L  --   --  3*   AST U/L  --   --  18    < > = values in this interval not displayed. * I Have Reviewed All Lab Data Listed Above. * Additional Pertinent Lab Tests Reviewed: [address] Admission Reviewed    Imaging:    Imaging Reports Reviewed Today Include: yes  Imaging Personally Reviewed by Myself Includes:  yes    Recent Cultures (last 7 days):     Results from last 7 days   Lab Units 10/16/23  1610   URINE CULTURE  Culture results to follow.        Last 24 Hours Medication List:   Current Facility-Administered Medications   Medication Dose Route Frequency Provider Last Rate    amLODIPine  10 mg Per G Tube Daily Shira Holden MD      ascorbic acid  250 mg Per G Tube Daily Shira Holden MD      azithromycin  500 mg Intravenous Q24H Shira Holden  mg (10/17/23 1815)    budesonide  0.25 mg Nebulization BID Shira Holden MD      carbidopa-levodopa  1 tablet Per G Tube TID Bill Hobbs MD      famotidine  20 mg Per G Tube Every Other Talisha Tipton MD      folic acid  1 mg Per G Tube Daily Phani Salvador MD      glycerin-hypromellose-  1 drop Both Eyes Q6H PRN Carlos Day MD      hydroxypropyl methylcellulose  1 drop Both Eyes TID Phani Salvador MD      ipratropium-albuterol  3 mL Nebulization TID Phani Salvador MD      labetalol  200 mg Per Joel MD Larry      lamoTRIgine  25 mg Per G Tube BID Phani Salvador MD      latanoprost  1 drop Both Eyes HS Phani Salvador MD      mirtazapine  15 mg Per G Tube HS Carlos Day MD      nystatin   Topical TID Phani Salvador MD      oxybutynin  5 mg Per G Tube TID Carlos Day MD      polyethylene glycol  17 g Per PEG Tube Daily Phani Salvador MD      saccharomyces boulardii  250 mg Per G Tube BID Phani Salvador MD      senna  8.8 mg Per PEG Tube HS Phani Salvador MD      sertraline  100 mg Per G Tube Daily Phani Salvador MD               ** Please Note: Dictation voice to text software may have been used in the creation of this document.  Phani Salvador MD  10/18/23  6:47 AM

## 2023-10-18 NOTE — PROGRESS NOTES
Progress Note - Urology      Patient: Emmie Degree   : 1940 Sex: male   MRN: [id number]     CSN: [id number]  Unit/Bed#: [address] [place] [address]     SUBJECTIVE:   Patient seen on evening rounds with daughter present  Hematuria gone  Acute renal failure creatinine trending down with hydration  We will DC Lieberman catheter attempt voiding trial once patient more alert if possible      Objective   Vitals: /67   Pulse 65   Temp 98.1 °F (36.7 °C)   Resp 14   Ht 6' 1" (1.854 m)   Wt 84.2 kg (185 lb 10 oz)   SpO2 100%   BMI 24.49 kg/m²     I/O last 24 hours: In: 4692 [NG/GT:1310;  Feedings:510]  Out: 1200 [Urine:1200]      Physical Exam:   General eyes open somewhat confused   normocephalic atraumatic PERRLA  Lungs clear bilaterally  Cardiac normal S1 normal S2  Abdomen soft, flank pain  Urine concentrated no blood  Extremities no edema      Lab Results: CBC:   Lab Results   Component Value Date    WBC 6.99 10/18/2023    HGB 8.9 (L) 10/18/2023    HCT 27.8 (L) 10/18/2023    MCV 87 10/18/2023     10/18/2023    RBC 3.18 (L) 10/18/2023    MCH 28.0 10/18/2023    MCHC 32.0 10/18/2023    RDW 16.7 (H) 10/18/2023    MPV 8.7 (L) 10/18/2023    NRBC 0 10/18/2023     CMP:   Lab Results   Component Value Date     (H) 10/18/2023    CO2 19 (L) 10/18/2023    BUN 35 (H) 10/18/2023    CREATININE 2.38 (H) 10/18/2023    CALCIUM 8.6 10/18/2023    AST 18 10/16/2023    ALT 3 (L) 10/16/2023    ALKPHOS 45 10/16/2023    EGFR 24 10/18/2023     Urinalysis:   Lab Results   Component Value Date    COLORU Alba 10/16/2023    CLARITYU Cloudy 10/16/2023    SPECGRAV 1.025 10/16/2023    PHUR 5.5 10/16/2023    LEUKOCYTESUR Large (A) 10/16/2023    NITRITE Negative 10/16/2023    GLUCOSEU Negative 10/16/2023    KETONESU Trace (A) 10/16/2023    BILIRUBINUR Small (A) 10/16/2023    BLOODU Large (A) 10/16/2023     Urine Culture:   Lab Results   Component Value Date    URINECX 50,000-59,000 cfu/ml Proteus mirabilis ESBL (A) 10/16/2023     PSA: Lab Results   Component Value Date    PSA 5.61 (H) 10/06/2023         Assessment/ Plan:  Renal failure trending down/complicated UTI  Lieberman catheter draining clear urine  Patient appears to be somewhat less confused  On discussion with daughter at the bedside tonight can attempt voiding trial prior to discharge a note blood in the Lieberman bag in light of chronic catheter and blood thinners          Burgess Jerzy MD

## 2023-10-18 NOTE — PLAN OF CARE
Problem: RESPIRATORY - ADULT  Goal: Achieves optimal ventilation and oxygenation  Description: INTERVENTIONS:  - Assess for changes in respiratory status  - Assess for changes in mentation and behavior  - Position to facilitate oxygenation and minimize respiratory effort  - Oxygen administered by appropriate delivery if ordered  - Initiate smoking cessation education as indicated  - Encourage broncho-pulmonary hygiene including cough, deep breathe, Incentive Spirometry  - Assess the need for suctioning and aspirate as needed  - Assess and instruct to report SOB or any respiratory difficulty  - Respiratory Therapy support as indicated  - Nebulizer  Outcome: Progressing     Problem: MOBILITY - ADULT  Goal: Maintain or return to baseline ADL function  Description: INTERVENTIONS:  -  Assess patient's ability to carry out ADLs; assess patient's baseline for ADL function and identify physical deficits which impact ability to perform ADLs (bathing, care of mouth/teeth, toileting, grooming, dressing, etc.)  - Assess/evaluate cause of self-care deficits   - Assess range of motion  - Assess patient's mobility; develop plan if impaired  - Assess patient's need for assistive devices and provide as appropriate  - Encourage maximum independence but intervene and supervise when necessary  - Involve family in performance of ADLs  - Assess for home care needs following discharge   - Consider OT consult to assist with ADL evaluation and planning for discharge  - Provide patient education as appropriate  Outcome: Progressing  Goal: Maintains/Returns to pre admission functional level  Description: INTERVENTIONS:  - Perform BMAT or MOVE assessment daily.   - Set and communicate daily mobility goal to care team and patient/family/caregiver. - Collaborate with rehabilitation services on mobility goals if consulted  - Reposition patient every 2 hours.   - Record patient progress and toleration of activity level   Outcome: Progressing Problem: Prexisting or High Potential for Compromised Skin Integrity  Goal: Skin integrity is maintained or improved  Description: INTERVENTIONS:  - Identify patients at risk for skin breakdown  - Assess and monitor skin integrity  - Assess and monitor nutrition and hydration status  - Monitor labs   - Assess for incontinence   - Turn and reposition patient  - Assist with mobility/ambulation  - Relieve pressure over bony prominences  - Avoid friction and shearing  - Provide appropriate hygiene as needed including keeping skin clean and dry  - Evaluate need for skin moisturizer/barrier cream  - Collaborate with interdisciplinary team   - Patient/family teaching  Outcome: Progressing     Problem: Nutrition/Hydration-ADULT  Goal: Nutrient/Hydration intake appropriate for improving, restoring or maintaining nutritional needs  Description: Monitor and assess patient's nutrition/hydration status for malnutrition. Collaborate with interdisciplinary team and initiate plan and interventions as ordered. Monitor patient's weight and dietary intake as ordered or per policy. Utilize nutrition screening tool and intervene as necessary. Determine patient's food preferences and provide high-protein, high-caloric foods as appropriate.      INTERVENTIONS:  - Monitor intake, urinary output, labs, and treatment plans  - Assess nutrition and hydration status and recommend course of action  - Recommend/ encourage appropriate nutritional supplements, and vitamin/mineral supplements  - Recommend, monitor, and adjust tube feedings based on assessed needs  - Assess need for intravenous fluids  - Provide specific nutrition/hydration education as appropriate  - Include patient/family/caregiver in decisions related to nutrition  Outcome: Progressing

## 2023-10-18 NOTE — PROGRESS NOTES
Pt due for outreach. Pt remains hospitalized. In patient case management notes reviewed. Plan  is for patient to return home with services through [facility]. Referral for [facility] was placed through [address] [place] [address]. RN CM will continue to follow.

## 2023-10-18 NOTE — ASSESSMENT & PLAN NOTE
Pt has a chronic hx of parkinson's and functional quadriplegia secondary to severe dementia and Parkinson's a/e/b bedbound. Patient requires assistance with all of his ADLs. Contractures to be treated with increased assistance and total care. He resides with daughter who provides complete care.      Continue frequent repositioning   Check feeding tubes insertion site to make sure the patient is not pulling on that

## 2023-10-18 NOTE — PLAN OF CARE
Problem: RESPIRATORY - ADULT  Goal: Achieves optimal ventilation and oxygenation  Description: INTERVENTIONS:  - Assess for changes in respiratory status  - Assess for changes in mentation and behavior  - Position to facilitate oxygenation and minimize respiratory effort  - Oxygen administered by appropriate delivery if ordered  - Initiate smoking cessation education as indicated  - Encourage broncho-pulmonary hygiene including cough, deep breathe, Incentive Spirometry  - Assess the need for suctioning and aspirate as needed  - Assess and instruct to report SOB or any respiratory difficulty  - Respiratory Therapy support as indicated  - Nebulizer  Outcome: Progressing     Problem: MOBILITY - ADULT  Goal: Maintain or return to baseline ADL function  Description: INTERVENTIONS:  -  Assess patient's ability to carry out ADLs; assess patient's baseline for ADL function and identify physical deficits which impact ability to perform ADLs (bathing, care of mouth/teeth, toileting, grooming, dressing, etc.)  - Assess/evaluate cause of self-care deficits   - Assess range of motion  - Assess patient's mobility; develop plan if impaired  - Assess patient's need for assistive devices and provide as appropriate  - Encourage maximum independence but intervene and supervise when necessary  - Involve family in performance of ADLs  - Assess for home care needs following discharge   - Consider OT consult to assist with ADL evaluation and planning for discharge  - Provide patient education as appropriate  Outcome: Progressing  Goal: Maintains/Returns to pre admission functional level  Description: INTERVENTIONS:  - Perform BMAT or MOVE assessment daily.   - Set and communicate daily mobility goal to care team and patient/family/caregiver. - Collaborate with rehabilitation services on mobility goals if consulted  - Perform Range of Motion 3 times a day. - Reposition patient every 2 hours.   - Dangle patient 3 times a day  - Stand patient 3 times a day  - Ambulate patient 3 times a day  - Out of bed to chair 3 times a day   - Out of bed for meals 3 times a day  - Out of bed for toileting  - Record patient progress and toleration of activity level   Outcome: Progressing     Problem: Prexisting or High Potential for Compromised Skin Integrity  Goal: Skin integrity is maintained or improved  Description: INTERVENTIONS:  - Identify patients at risk for skin breakdown  - Assess and monitor skin integrity  - Assess and monitor nutrition and hydration status  - Monitor labs   - Assess for incontinence   - Turn and reposition patient  - Assist with mobility/ambulation  - Relieve pressure over bony prominences  - Avoid friction and shearing  - Provide appropriate hygiene as needed including keeping skin clean and dry  - Evaluate need for skin moisturizer/barrier cream  - Collaborate with interdisciplinary team   - Patient/family teaching  - Consider wound care consult   Outcome: Progressing     Problem: Nutrition/Hydration-ADULT  Goal: Nutrient/Hydration intake appropriate for improving, restoring or maintaining nutritional needs  Description: Monitor and assess patient's nutrition/hydration status for malnutrition. Collaborate with interdisciplinary team and initiate plan and interventions as ordered. Monitor patient's weight and dietary intake as ordered or per policy. Utilize nutrition screening tool and intervene as necessary. Determine patient's food preferences and provide high-protein, high-caloric foods as appropriate.      INTERVENTIONS:  - Monitor intake, urinary output, labs, and treatment plans  - Assess nutrition and hydration status and recommend course of action  - Recommend/ encourage appropriate nutritional supplements, and vitamin/mineral supplements  - Recommend, monitor, and adjust tube feedings based on assessed needs  - Assess need for intravenous fluids  - Provide specific nutrition/hydration education as appropriate  - Include patient/family/caregiver in decisions related to nutrition  Outcome: Progressing

## 2023-10-19 ENCOUNTER — TELEPHONE (OUTPATIENT)
Dept: FAMILY MEDICINE CLINIC | Facility: CLINIC | Age: 83
End: 2023-10-19

## 2023-10-19 PROBLEM — R82.90 ABNORMAL URINALYSIS: Status: ACTIVE | Noted: 2023-10-19

## 2023-10-19 LAB
ANION GAP SERPL CALCULATED.3IONS-SCNC: 6 MMOL/L
BASOPHILS # BLD AUTO: 0.05 THOUSANDS/ÂΜL (ref 0–0.1)
BASOPHILS NFR BLD AUTO: 1 % (ref 0–1)
BUN SERPL-MCNC: 33 MG/DL (ref 5–25)
CALCIUM SERPL-MCNC: 8.8 MG/DL (ref 8.4–10.2)
CHLORIDE SERPL-SCNC: 110 MMOL/L (ref 96–108)
CO2 SERPL-SCNC: 23 MMOL/L (ref 21–32)
CREAT SERPL-MCNC: 2.12 MG/DL (ref 0.6–1.3)
EOSINOPHIL # BLD AUTO: 0.48 THOUSAND/ÂΜL (ref 0–0.61)
EOSINOPHIL NFR BLD AUTO: 7 % (ref 0–6)
ERYTHROCYTE [DISTWIDTH] IN BLOOD BY AUTOMATED COUNT: 16.9 % (ref 11.6–15.1)
GFR SERPL CREATININE-BSD FRML MDRD: 27 ML/MIN/1.73SQ M
GLUCOSE SERPL-MCNC: 118 MG/DL (ref 65–140)
HCT VFR BLD AUTO: 26.4 % (ref 36.5–49.3)
HGB BLD-MCNC: 8.5 G/DL (ref 12–17)
IMM GRANULOCYTES # BLD AUTO: 0.01 THOUSAND/UL (ref 0–0.2)
IMM GRANULOCYTES NFR BLD AUTO: 0 % (ref 0–2)
LYMPHOCYTES # BLD AUTO: 2.25 THOUSANDS/ÂΜL (ref 0.6–4.47)
LYMPHOCYTES NFR BLD AUTO: 33 % (ref 14–44)
MCH RBC QN AUTO: 27.9 PG (ref 26.8–34.3)
MCHC RBC AUTO-ENTMCNC: 32.2 G/DL (ref 31.4–37.4)
MCV RBC AUTO: 87 FL (ref 82–98)
MONOCYTES # BLD AUTO: 0.66 THOUSAND/ÂΜL (ref 0.17–1.22)
MONOCYTES NFR BLD AUTO: 10 % (ref 4–12)
NEUTROPHILS # BLD AUTO: 3.3 THOUSANDS/ÂΜL (ref 1.85–7.62)
NEUTS SEG NFR BLD AUTO: 49 % (ref 43–75)
NRBC BLD AUTO-RTO: 0 /100 WBCS
PLATELET # BLD AUTO: 276 THOUSANDS/UL (ref 149–390)
PMV BLD AUTO: 9.3 FL (ref 8.9–12.7)
POTASSIUM SERPL-SCNC: 4 MMOL/L (ref 3.5–5.3)
RBC # BLD AUTO: 3.05 MILLION/UL (ref 3.88–5.62)
SODIUM SERPL-SCNC: 139 MMOL/L (ref 135–147)
WBC # BLD AUTO: 6.75 THOUSAND/UL (ref 4.31–10.16)

## 2023-10-19 PROCEDURE — 94760 N-INVAS EAR/PLS OXIMETRY 1: CPT

## 2023-10-19 PROCEDURE — 85025 COMPLETE CBC W/AUTO DIFF WBC: CPT

## 2023-10-19 PROCEDURE — 99232 SBSQ HOSP IP/OBS MODERATE 35: CPT | Performed by: FAMILY MEDICINE

## 2023-10-19 PROCEDURE — 80048 BASIC METABOLIC PNL TOTAL CA: CPT

## 2023-10-19 PROCEDURE — 94640 AIRWAY INHALATION TREATMENT: CPT

## 2023-10-19 RX ORDER — SIMETHICONE 80 MG
80 TABLET,CHEWABLE ORAL ONCE
Status: COMPLETED | OUTPATIENT
Start: 2023-10-19 | End: 2023-10-19

## 2023-10-19 RX ORDER — ACETAMINOPHEN 10 MG/ML
1000 INJECTION, SOLUTION INTRAVENOUS EVERY 8 HOURS PRN
Status: DISCONTINUED | OUTPATIENT
Start: 2023-10-19 | End: 2023-10-21 | Stop reason: HOSPADM

## 2023-10-19 RX ADMIN — FOLIC ACID 1 MG: 1 TABLET ORAL at 09:41

## 2023-10-19 RX ADMIN — OXYBUTYNIN CHLORIDE 5 MG: 5 TABLET ORAL at 22:25

## 2023-10-19 RX ADMIN — LABETALOL HYDROCHLORIDE 200 MG: 100 TABLET, FILM COATED ORAL at 18:52

## 2023-10-19 RX ADMIN — AZITHROMYCIN MONOHYDRATE 500 MG: 500 INJECTION, POWDER, LYOPHILIZED, FOR SOLUTION INTRAVENOUS at 17:55

## 2023-10-19 RX ADMIN — Medication 250 MG: at 09:41

## 2023-10-19 RX ADMIN — SIMETHICONE 80 MG: 80 TABLET, CHEWABLE ORAL at 22:25

## 2023-10-19 RX ADMIN — OXYBUTYNIN CHLORIDE 5 MG: 5 TABLET ORAL at 09:41

## 2023-10-19 RX ADMIN — MIRTAZAPINE 15 MG: 15 TABLET, FILM COATED ORAL at 22:25

## 2023-10-19 RX ADMIN — NYSTATIN: 100000 POWDER TOPICAL at 22:29

## 2023-10-19 RX ADMIN — SERTRALINE 100 MG: 100 TABLET, FILM COATED ORAL at 09:41

## 2023-10-19 RX ADMIN — BUDESONIDE 0.25 MG: 0.25 INHALANT ORAL at 07:14

## 2023-10-19 RX ADMIN — LAMOTRIGINE 25 MG: 25 TABLET ORAL at 18:51

## 2023-10-19 RX ADMIN — IPRATROPIUM BROMIDE AND ALBUTEROL SULFATE 3 ML: 2.5; .5 SOLUTION RESPIRATORY (INHALATION) at 20:25

## 2023-10-19 RX ADMIN — OXYBUTYNIN CHLORIDE 5 MG: 5 TABLET ORAL at 17:00

## 2023-10-19 RX ADMIN — LABETALOL HYDROCHLORIDE 200 MG: 100 TABLET, FILM COATED ORAL at 05:01

## 2023-10-19 RX ADMIN — CARBIDOPA AND LEVODOPA 1 TABLET: 25; 100 TABLET ORAL at 17:00

## 2023-10-19 RX ADMIN — Medication 250 MG: at 18:51

## 2023-10-19 RX ADMIN — NYSTATIN: 100000 POWDER TOPICAL at 17:00

## 2023-10-19 RX ADMIN — BUDESONIDE 0.25 MG: 0.25 INHALANT ORAL at 20:25

## 2023-10-19 RX ADMIN — LAMOTRIGINE 25 MG: 25 TABLET ORAL at 09:40

## 2023-10-19 RX ADMIN — CARBIDOPA AND LEVODOPA 1 TABLET: 25; 100 TABLET ORAL at 09:41

## 2023-10-19 RX ADMIN — CARBIDOPA AND LEVODOPA 1 TABLET: 25; 100 TABLET ORAL at 22:25

## 2023-10-19 RX ADMIN — AMLODIPINE BESYLATE 10 MG: 10 TABLET ORAL at 09:41

## 2023-10-19 RX ADMIN — LATANOPROST 1 DROP: 50 SOLUTION OPHTHALMIC at 22:28

## 2023-10-19 RX ADMIN — IPRATROPIUM BROMIDE AND ALBUTEROL SULFATE 3 ML: 2.5; .5 SOLUTION RESPIRATORY (INHALATION) at 07:13

## 2023-10-19 NOTE — PLAN OF CARE
Problem: RESPIRATORY - ADULT  Goal: Achieves optimal ventilation and oxygenation  Description: INTERVENTIONS:  - Assess for changes in respiratory status  - Assess for changes in mentation and behavior  - Position to facilitate oxygenation and minimize respiratory effort  - Oxygen administered by appropriate delivery if ordered  - Initiate smoking cessation education as indicated  - Encourage broncho-pulmonary hygiene including cough, deep breathe, Incentive Spirometry  - Assess the need for suctioning and aspirate as needed  - Assess and instruct to report SOB or any respiratory difficulty  - Respiratory Therapy support as indicated  - Nebulizer  Outcome: Progressing     Problem: MOBILITY - ADULT  Goal: Maintain or return to baseline ADL function  Description: INTERVENTIONS:  -  Assess patient's ability to carry out ADLs; assess patient's baseline for ADL function and identify physical deficits which impact ability to perform ADLs (bathing, care of mouth/teeth, toileting, grooming, dressing, etc.)  - Assess/evaluate cause of self-care deficits   - Assess range of motion  - Assess patient's mobility; develop plan if impaired  - Assess patient's need for assistive devices and provide as appropriate  - Encourage maximum independence but intervene and supervise when necessary  - Involve family in performance of ADLs  - Assess for home care needs following discharge   - Consider OT consult to assist with ADL evaluation and planning for discharge  - Provide patient education as appropriate  Outcome: Progressing     Problem: MOBILITY - ADULT  Goal: Maintains/Returns to pre admission functional level  Description: INTERVENTIONS:  - Perform BMAT or MOVE assessment daily.   - Set and communicate daily mobility goal to care team and patient/family/caregiver. - Collaborate with rehabilitation services on mobility goals if consulted  - Perform Range of Motion 3 times a day. - Reposition patient every 2 hours.   - Record patient progress and toleration of activity level   Outcome: Progressing     Problem: Prexisting or High Potential for Compromised Skin Integrity  Goal: Skin integrity is maintained or improved  Description: INTERVENTIONS:  - Identify patients at risk for skin breakdown  - Assess and monitor skin integrity  - Assess and monitor nutrition and hydration status  - Monitor labs   - Assess for incontinence   - Turn and reposition patient  - Assist with mobility/ambulation  - Relieve pressure over bony prominences  - Avoid friction and shearing  - Provide appropriate hygiene as needed including keeping skin clean and dry  - Evaluate need for skin moisturizer/barrier cream  - Collaborate with interdisciplinary team   - Patient/family teaching  - Consider wound care consult   Outcome: Progressing     Problem: Nutrition/Hydration-ADULT  Goal: Nutrient/Hydration intake appropriate for improving, restoring or maintaining nutritional needs  Description: Monitor and assess patient's nutrition/hydration status for malnutrition. Collaborate with interdisciplinary team and initiate plan and interventions as ordered. Monitor patient's weight and dietary intake as ordered or per policy. Utilize nutrition screening tool and intervene as necessary. Determine patient's food preferences and provide high-protein, high-caloric foods as appropriate.      INTERVENTIONS:  - Monitor intake, urinary output, labs, and treatment plans  - Assess nutrition and hydration status and recommend course of action  - Recommend/ encourage appropriate nutritional supplements, and vitamin/mineral supplements  - Recommend, monitor, and adjust tube feedings based on assessed needs  - Assess need for intravenous fluids  - Provide specific nutrition/hydration education as appropriate  - Include patient/family/caregiver in decisions related to nutrition  Outcome: Progressing

## 2023-10-19 NOTE — PROGRESS NOTES
Spiritual Care Progress Note    10/19/2023  Patient: Sohan Garcia : 1940  Admission Date & Time: 10/16/2023 1143  MRN: [id number] CSN: [id number]     met with pt's daughter Jo Ann Hurtado and CM Stephenie Bah outside pt's room.  introduced self & role, encouraged self-care, and informed pt's daughter that  has been providing emotional care for pt during previous hospitalization. Pt's dtr thanked  and CM for support. Spiritual care remains available.              Chaplaincy Interventions Utilized:   Empowerment: Provided chaplaincy education    Exploration: Explored emotional needs & resources    Collaboration: Consulted with interdisciplinary team    Relationship Building: Cultivated a relationship of care and support       10/19/23 1500   Clinical Encounter Type   Visited With Family   Routine Visit Introduction   Referral From

## 2023-10-19 NOTE — CASE MANAGEMENT
Case Management Progress Note    Patient name Rohit Living  Location [address]  [facility] MRN [id number]  : 1940 Date 10/19/2023       LOS (days): 3  Geometric Mean LOS (GMLOS) (days): 3.90  Days to GMLOS:1        OBJECTIVE:        Current admission status: Inpatient  Preferred Pharmacy:   [facility] [address] [facility], [address] [facility] [address] [facility]  Phone: [phone] Fax: [phone]    Primary Care Provider: ROSITA Monae    Primary Insurance: MEDICARE  Secondary Insurance: BLUE CROSS    PROGRESS NOTE:    Plan to have a [address] conversation in person this afternoon with patient's dtr Cedric Guallpa was decided at medical rounds this morning. SRINIVAS Torres texted the medical providers, KATE and Art Daigle when Cedric Saloni present. KATE and Sophie Heard met with patient's dtr Pao along with hospital  Sophie Heard, introduced self and role. Cedric Guallpa stated she was on phone with Dr. Ramila Gomez and Dr. Fuad Vega and it was decided to have [address] next day in afternoon when Cedric Guallpa is here again. Meanwhile KATE did inform Pao that [facility] does provide palliative and hospice care. KATE and Art Daigle provided emotional support. KATE informed Pao that KATE Thomas will be present tomm 10/20 for the [address] meeting. and T-bands given for progression of HEP     Goals:       Long term goals  Time Frame for Long term goals : 6 weeks  Long term goal 1: Pt will be indep and compliant with HEP to manage symtoms. Long term goal 2: Pt will increased L shoulder ROM WNL to facilitate improved ability to perform ADLs  Long term goal 3: Pt will increased L UE (including periscapular mm) >4+/5 strength to improve functional mobility  Long term goal 4: Pt will improve L  strength to equal that of R to improve functional activitiy tolerance with UEs  Long term goal 5: Pt will improve UEFS to > 70/80 to demonstrate improved functional activity tolerance  Long term goal 6: Pt will demonstrate improved postural control with <10% VC to decreased pain. Progress toward goals: On going     POST-PAIN  [x]No     []Yes      Location:    Pain Rating (0-10 pain scale): 0/10  Pain Description:   Action: [] NA  [] Call Physician  [] Perform HEP  [] Meds as prescribed    Frequency/Duration:  Plan  Times per week: 2  Plan weeks: 6  Current Treatment Recommendations: Strengthening, ROM, Neuromuscular Re-education, Manual Therapy - Joint Manipulation, Home Exercise Program, Equipment Evaluation, Education, & procurement, Modalities, Safety Education & Training, Patient/Caregiver Education & Training, Positioning, Integrated Dry Needling, Manual Therapy - Soft Tissue Mobilization     HEP  [] Continue current  [x] Added: T-Band     PT Individual Minutes  Time In: 9205  Time Out: 1000  Minutes: 40  Timed Code Treatment Minutes: 40 Minutes  Procedure Minutes:NA     Signature:  Electronically signed by Amarjit Bejarano  on 10/31/18 at 11:04 AM

## 2023-10-19 NOTE — PROGRESS NOTES
Daily Progress Note - [facility] Medicine Residency  Tiki Guardado 80 y.o. male MRN: [id number]  Unit/Bed#: 913 [facility] 407-01 Encounter: [id number]  Admitting Physician: Meagan Hobbs MD   PCP: ROSITA Monae  Date of Admission:  10/16/2023 11:43 AM    Assessment and Plan    * Chest pain  Assessment & Plan  Patient 80year-old with PMH of Parkinson's, dementia, minimally verbal. According to the daughter, patient had chest pain, chest tightness and heavy breathing since today morning. She gave him 1 treatment of albuterol and Pulmicort but it did not improve. Patient has no cough, but was a little short of breath when he presented to the ED. When the daughter asked him where the pain was he was pointing to her chest.  No other complaints by the daughter, ROS was unobtainable due to minimally verbal patient. In the ED, patient pointed epigastric region as a source of pain. LA: 2.1<1.6, Trops: (0)21;(2)18;(4)18; Hgb 10.3; Procal 0.09  EKG: Sinus rhythm w/sinus arrhythmia with 1st degree A-V block, Qtc 411  CT A/B: No acute abdominal or pelvic pathology within the aforementioned limitations. PEG tube in place with no associated fluid collections. No bowel obstruction or convincing inflammation. Normal appendix. Urinary bladder obscured by streak artifact from the bilateral hip arthroplasties. No hydronephrosis. Minimal groundglass opacities are seen in the upper lobe centrally, which may be infectious. Echo: EF 60%, mild to moderate concentric hypertrophy. Normal systolic/diastolic heart function    Continue to Monitor vital signs  Start Azithromycin 500mg Q24  Continue Duo-nebs Q8  Continue Pulmicort 0.25 twice daily  Stop eliquis      Chronic UTI  Assessment & Plan  Pt is a 79 yo male w/dementia, is + UA for large leukocytes, large occult blood, small bilirubin, trace ketones and small bilirubin.   On Admission, according to pt's daughter he has not complained about difficulty urinating but had decreased frequency. In the afternoon he would have increased residual. Bladder scan showed 25cc of urine. WBC 7.04, afebrile, no tachycardia, no tachypnea. 10/17:Pt has bloody urine and decreased urine output on 10/17. Post voidal scan showed 580ml, 22f Lieberman placed  Urine culture: Proteus mirabilis ESBL Abnormal     Continue to Monitor vital signs  Monitor I and Os  Nephrology consulted, recs appreciated  Discontinue Eliquis, due to hematuria    Chronic deep vein thrombosis (DVT) of femoral vein of both lower extremities (HCC)  Assessment & Plan  Pt has a hx of chronic DVTs w/IVC filter in place. During last admission he was found to have Acute right lower extremity DVT in EIV and CFV. Pt was started Eliquis loading dose of 5 mg BID for 7 days and than 2.5 mg BID. Pt developed hematuria after re-starting Eliquis 10/18. Discontinue Eliquis 2.5mg BID  Monitor signs for any new swelling on lower extremities    Stage 3 chronic kidney disease [facility])  Assessment & Plan  Lab Results   Component Value Date    EGFR 27 10/19/2023    EGFR 24 10/18/2023    EGFR 22 10/17/2023    CREATININE 2.12 (H) 10/19/2023    CREATININE 2.38 (H) 10/18/2023    CREATININE 2.49 (H) 10/17/2023     Pt has A hx of Stage 3 CKD, follows up with [facility] prior to the last admission. His baseline Creatinine is around 2.0 and EFGR of 20. Renal dose his medications  Avoid Nephrotoxic medications  Avoid hypotension  Trend Creatinine    Functional quadriplegia (Former[facility])  Assessment & Plan  Pt has a chronic hx of parkinson's and functional quadriplegia secondary to severe dementia and Parkinson's a/e/b bedbound. Patient requires assistance with all of his ADLs. Contractures to be treated with increased assistance and total care. He resides with daughter who provides complete care.      Continue frequent repositioning   Check feeding tubes insertion site to make sure the patient is not pulling on that    Depression  Assessment & Plan  Chronic, stable  Continue home medications: Mirtazapine 7.5 mg daily at bedtime, Zoloft 100 mg per G-tube daily    Dry eyes  Assessment & Plan  Chronic, stable  Continue artificial tears 1 drop in each eye 3 times daily as needed    Gastroesophageal reflux disease  Assessment & Plan  Chronic, stable  Continue home medication Pepcid 20 mg every other day    Constipation  Assessment & Plan  Chronic, stable    Pt has some episodes of diarrhea yesterday (10/18)    Hold home meds MiraLAX, senna per G-tube    Chronic obstructive pulmonary disease (HCC)  Assessment & Plan  Chronic, stable    Patient has a past medical history of COPD, stable on his home medications  Continue Pulmicort 0.25 twice daily  Continue DuoNebs 3 times daily    Dysphagia  Assessment & Plan  On prior admission patient was unable to swallow his medications and had a risk for aspiration this was placed on G tube. Patient receives all of his enteric feeds and medications via the G-tube. Patient's creatinine is 2.34. Check gastrotomy tube daily  Feeds consist of Glucerna 1.2, 75 ml/h continuous with water boluses every 4 hours with 120ml flush  Increase in water bolus due to creatinine trending up  Soft restraints, to avoid pulling out the G tube    Severe dementia [facility])  Assessment & Plan  Chronic,  Patient has a history of progressive dementia, secondary to Parkinson's disease. Soft restraints, to avoid pulling out the G tube and Lieberman        Hypertension  Assessment & Plan  Chronic, stable  BP 10/19: 130/59    Continue home medications Labetalol 200mg Q8  Continue amlodipine 10mg  Monitor blood pressure    PD (Parkinson's disease)  Assessment & Plan  Chronic, stable    Pt is minimally verbal, and will sometimes answer questions.      Continue carbidopa levodopa  mg 3 times daily per G-tube  Continue lamotrigine 25 mg twice daily per G-tube        VTE Pharmacologic Prophylaxis: VTE Score: 15 Moderate Risk (Score 3-4) - Pharmacological DVT Prophylaxis Contraindicated. Sequential Compression Devices Ordered. Patient Centered Rounds: I have performed bedside rounds with nursing staff today. Discussions with Specialists or Other Care Team Provider: Urology    Education and Discussions with Family / Patient:   Patient Information Sharing: With the consent of Christiano Carrasco , their loved ones Sridevi Moffett) were notified today by inpatient team of the patient’s condition and current plan. All questions answered. Time Spent for Care: 30 minutes. More than 50% of total time spent on counseling and coordination of care as described above. Current Length of Stay: 3 day(s)    Current Patient Status: Inpatient   Certification Statement: The patient will continue to require additional inpatient hospital stay due to Hematuria    Discharge Plan: Home    Code Status: Level 3 - DNAR and DNI    Subjective:   Pt seen and examined at bedside. Pt was minimally verbal and did not complain of any pain. According to the nurse, Patient had diarrhea. Pt's vazquez is draining "dark brown colored" urine. No other acute compliants at this time. Objective     Objective:   Vitals:   Temp (24hrs), Av.3 °F (36.8 °C), Min:98.1 °F (36.7 °C), Max:98.7 °F (37.1 °C)    Temp:  [98.1 °F (36.7 °C)-98.7 °F (37.1 °C)] 98.1 °F (36.7 °C)  HR:  [63-72] 64  Resp:  [17-18] 17  BP: (130-142)/(59-67) 130/59  SpO2:  [96 %-100 %] 100 %  Body mass index is 24.49 kg/m². Input and Output Summary (last 24 hours): Intake/Output Summary (Last 24 hours) at 10/19/2023 0916  Last data filed at 10/19/2023 0457  Gross per 24 hour   Intake 880 ml   Output 750 ml   Net 130 ml       Physical Exam:   Physical Exam  Constitutional:       Appearance: He is normal weight. Cardiovascular:      Rate and Rhythm: Normal rate and regular rhythm. Pulses: Normal pulses. Heart sounds: Normal heart sounds.    Pulmonary:      Effort: Pulmonary effort is normal.      Breath sounds: Normal breath sounds. Abdominal:      General: Abdomen is flat. Palpations: Abdomen is soft. Comments: G tube in place   Musculoskeletal:      Comments: Functional quadraplegia   Skin:     Capillary Refill: Capillary refill takes less than 2 seconds. Neurological:      Mental Status: Mental status is at baseline. He is disoriented. Additional Data:     Labs:  Results from last 7 days   Lab Units 10/19/23  0454   WBC Thousand/uL 6.75   HEMOGLOBIN g/dL 8.5*   HEMATOCRIT % 26.4*   PLATELETS Thousands/uL 276   NEUTROS PCT % 49   LYMPHS PCT % 33   MONOS PCT % 10   EOS PCT % 7*     Results from last 7 days   Lab Units 10/19/23  0454 10/17/23  0430 10/16/23  1207   POTASSIUM mmol/L 4.0   < > 4.4   CHLORIDE mmol/L 110*   < > 105   CO2 mmol/L 23   < > 24   BUN mg/dL 33*   < > 32*   CREATININE mg/dL 2.12*   < > 2.34*   CALCIUM mg/dL 8.8   < > 9.0   ALK PHOS U/L  --   --  45   ALT U/L  --   --  3*   AST U/L  --   --  18    < > = values in this interval not displayed. * I Have Reviewed All Lab Data Listed Above. * Additional Pertinent Lab Tests Reviewed:  [address] Admission Reviewed    Imaging:    Imaging Reports Reviewed Today Include: yes  Imaging Personally Reviewed by Myself Includes:  yes    Recent Cultures (last 7 days):     Results from last 7 days   Lab Units 10/16/23  1610   URINE CULTURE  50,000-59,000 cfu/ml Proteus mirabilis ESBL*       Last 24 Hours Medication List:   Current Facility-Administered Medications   Medication Dose Route Frequency Provider Last Rate    amLODIPine  10 mg Per G Tube Daily Lei Coto MD      apixaban  2.5 mg Oral BID Ntaali Lyn DO      ascorbic acid  250 mg Per G Tube Daily Lei Coto MD      azithromycin  500 mg Intravenous Q24H Lei Coto  mg (10/18/23 4615)    budesonide  0.25 mg Nebulization BID Lei Coto MD      carbidopa-levodopa  1 tablet Per G Tube TID Roseanna Lockett MD      famotidine  20 mg Per G Tube Every Other [place] MD Chano      folic acid  1 mg Per G Tube Daily Constantin Garvey MD      glycerin-hypromellose-  1 drop Both Eyes Q6H PRN Zoila Clay MD      ipratropium-albuterol  3 mL Nebulization TID Constantin Garvey MD      labetalol  200 mg Per Janell MD Andreas      lamoTRIgine  25 mg Per G Tube BID Constantin Garvey MD      latanoprost  1 drop Both Eyes HS Constantin Garvey MD      mirtazapine  15 mg Per G Tube HS Zoila Clay MD      nystatin   Topical TID Constantin Garvey MD      oxybutynin  5 mg Per G Tube TID Zoila Clay MD      polyethylene glycol  17 g Per PEG Tube Daily Constantin Garvey MD      saccharomyces boulardii  250 mg Per G Tube BID Constantin Garvey MD      senna  8.8 mg Per PEG Tube HS Constantin Garvey MD      sertraline  100 mg Per G Tube Daily Constantin Garvey MD               ** Please Note: Dictation voice to text software may have been used in the creation of this document.  Constantin Garvey MD  10/19/23  9:16 AM

## 2023-10-19 NOTE — PROGRESS NOTES
Progress Note - Urology      Patient: Raul Sessions   : 1940 Sex: male   MRN: [id number]     CSN: [id number]  Unit/Bed#: [address] [place] [address]     SUBJECTIVE:   Patient seen on evening rounds no issues with indwelling Lieberman catheter difficult cath most likely related to bladder masses obstructing the bladder neck status post transurethral resection bladder neck contracture  Discussion with hospital staff to be discharged with Lieberman catheter  May need office visit to be changed by myself  Since difficulty in getting Lieberman catheter over bladder masses  May need aggressive transurethral resection bladder masses to allow adequate passage of catheter for chronic change      Objective   Vitals: /57   Pulse 69   Temp 97.8 °F (36.6 °C)   Resp 18   Ht 6' 1" (1.854 m)   Wt 84.2 kg (185 lb 10 oz)   SpO2 95%   BMI 24.49 kg/m²     I/O last 24 hours:   In: 880 [I.V.:10; NG/GT:360; Feedings:510]  Out: 750 [Urine:750]      Physical Exam:   General blunted affect  Normocephalic atraumatic PERRLA  Lungs clear bilaterally  Cardiac normal S1 normal S2  Abdomen soft, flank pain  Extremities no edema      Lab Results: CBC:   Lab Results   Component Value Date    WBC 6.75 10/19/2023    HGB 8.5 (L) 10/19/2023    HCT 26.4 (L) 10/19/2023    MCV 87 10/19/2023     10/19/2023    RBC 3.05 (L) 10/19/2023    MCH 27.9 10/19/2023    MCHC 32.2 10/19/2023    RDW 16.9 (H) 10/19/2023    MPV 9.3 10/19/2023    NRBC 0 10/19/2023     CMP:   Lab Results   Component Value Date     (H) 10/19/2023    CO2 23 10/19/2023    BUN 33 (H) 10/19/2023    CREATININE 2.12 (H) 10/19/2023    CALCIUM 8.8 10/19/2023    AST 18 10/16/2023    ALT 3 (L) 10/16/2023    ALKPHOS 45 10/16/2023    EGFR 27 10/19/2023     Urinalysis:   Lab Results   Component Value Date    COLORU Alba 10/16/2023    CLARITYU Cloudy 10/16/2023    SPECGRAV 1.025 10/16/2023    PHUR 5.5 10/16/2023    LEUKOCYTESUR Large (A) 10/16/2023    NITRITE Negative 10/16/2023    GLUCOSEU Negative 10/16/2023    KETONESU Trace (A) 10/16/2023    BILIRUBINUR Small (A) 10/16/2023    BLOODU Large (A) 10/16/2023     Urine Culture:   Lab Results   Component Value Date    URINECX 50,000-59,000 cfu/ml Proteus mirabilis ESBL (A) 10/16/2023     PSA:   Lab Results   Component Value Date    PSA 5.61 (H) 10/06/2023         Assessment/ Plan:  Complicated UTI  Status post cystoscopy transurethral section bladder neck contracture  Renal failure creatinine trending down  Patient to be discharged with indwelling Lieberman will need to have it changed by visiting nurse would strongly recommend 18 [place] cou[address]é to get over elevated bladder neck          Behzad Naidu MD

## 2023-10-19 NOTE — ASSESSMENT & PLAN NOTE
On admission his UA was positive for large leukocytes, large occult blood, small bilirubin, trace ketones and small bilirubin. His Eliquis was restarted during his hospital stay, but then had to be discontinued indefinitely after having gross hematuria along with decreased urine output. At that time bladder scan was done which shows greater than 500 mL urinary retention. This case was discussed with urology who recommended placing a Lieberman catheter. Creatinine appeared at baseline throughout and this was not acute kidney injury but chronic kidney disease. UA was abnormal but in the setting of Lieberman catheter, will always be abnormal.  Patient was recently treated with IV antibiotics on prior admission. Per chart review patient does have IVC filter in place given his history of chronic DVTs. His Urine culture: Proteus mirabilis ESBL.   Patient was discharged home with [facility] instructions to monitor intake and output

## 2023-10-19 NOTE — PLAN OF CARE
Problem: RESPIRATORY - ADULT  Goal: Achieves optimal ventilation and oxygenation  Description: INTERVENTIONS:  - Assess for changes in respiratory status  - Assess for changes in mentation and behavior  - Position to facilitate oxygenation and minimize respiratory effort  - Oxygen administered by appropriate delivery if ordered  - Initiate smoking cessation education as indicated  - Encourage broncho-pulmonary hygiene including cough, deep breathe, Incentive Spirometry  - Assess the need for suctioning and aspirate as needed  - Assess and instruct to report SOB or any respiratory difficulty  - Respiratory Therapy support as indicated  - Nebulizer  10/19/2023 0150 by Jeremiah Davies RN  Outcome: Progressing  10/19/2023 0138 by Jeremiah Davies RN  Outcome: Progressing     Problem: MOBILITY - ADULT  Goal: Maintain or return to baseline ADL function  Description: INTERVENTIONS:  -  Assess patient's ability to carry out ADLs; assess patient's baseline for ADL function and identify physical deficits which impact ability to perform ADLs (bathing, care of mouth/teeth, toileting, grooming, dressing, etc.)  - Assess/evaluate cause of self-care deficits   - Assess range of motion  - Assess patient's mobility; develop plan if impaired  - Assess patient's need for assistive devices and provide as appropriate  - Encourage maximum independence but intervene and supervise when necessary  - Involve family in performance of ADLs  - Assess for home care needs following discharge   - Consider OT consult to assist with ADL evaluation and planning for discharge  - Provide patient education as appropriate  10/19/2023 0150 by Jeremiah Davies RN  Outcome: Progressing  10/19/2023 0138 by Jeremiah Davies RN  Outcome: Progressing     Problem: Prexisting or High Potential for Compromised Skin Integrity  Goal: Skin integrity is maintained or improved  Description: INTERVENTIONS:  - Identify patients at risk for skin breakdown  - Assess and monitor skin integrity  - Assess and monitor nutrition and hydration status  - Monitor labs   - Assess for incontinence   - Turn and reposition patient  - Assist with mobility/ambulation  - Relieve pressure over bony prominences  - Avoid friction and shearing  - Provide appropriate hygiene as needed including keeping skin clean and dry  - Evaluate need for skin moisturizer/barrier cream  - Collaborate with interdisciplinary team   - Patient/family teaching  - Consider wound care consult   10/19/2023 0150 by Pete Irizarry RN  Outcome: Progressing  10/19/2023 0138 by Pete Irizarry RN  Outcome: Progressing     Problem: Nutrition/Hydration-ADULT  Goal: Nutrient/Hydration intake appropriate for improving, restoring or maintaining nutritional needs  Description: Monitor and assess patient's nutrition/hydration status for malnutrition. Collaborate with interdisciplinary team and initiate plan and interventions as ordered. Monitor patient's weight and dietary intake as ordered or per policy. Utilize nutrition screening tool and intervene as necessary. Determine patient's food preferences and provide high-protein, high-caloric foods as appropriate.      INTERVENTIONS:  - Monitor intake, urinary output, labs, and treatment plans  - Assess nutrition and hydration status and recommend course of action  - Recommend/ encourage appropriate nutritional supplements, and vitamin/mineral supplements  - Recommend, monitor, and adjust tube feedings based on assessed needs  - Assess need for intravenous fluids  - Provide specific nutrition/hydration education as appropriate  - Include patient/family/caregiver in decisions related to nutrition  10/19/2023 0150 by Pete Irizarry RN  Outcome: Progressing  10/19/2023 0138 by Pete Irizarry RN  Outcome: Progressing     Problem: INFECTION - ADULT  Goal: Absence or prevention of progression during hospitalization  Description: INTERVENTIONS:  - Assess and monitor for signs and symptoms of infection  - Monitor lab/diagnostic results  - Monitor all insertion sites, i.e. indwelling lines, tubes, and drains  - Monitor endotracheal if appropriate and nasal secretions for changes in amount and color  - [place] appropriate cooling/warming therapies per order  - Administer medications as ordered  - Instruct and encourage patient and family to use good hand hygiene technique  - Identify and instruct in appropriate isolation precautions for identified infection/condition  Outcome: Progressing     Problem: INFECTION - ADULT  Goal: Absence of fever/infection during neutropenic period  Description: INTERVENTIONS:  - Monitor WBC    Outcome: Progressing     Problem: SAFETY ADULT  Goal: Patient will remain free of falls  Description: INTERVENTIONS:  - Educate patient/family on patient safety including physical limitations  - Instruct patient to call for assistance with activity   - Consult OT/PT to assist with strengthening/mobility   - Keep Call bell within reach  - Keep bed low and locked with side rails adjusted as appropriate  - Keep care items and personal belongings within reach  - Initiate and maintain comfort rounds  - Make Fall Risk Sign visible to staff  - Initiate/Maintain bed alarm  - Obtain necessary fall risk management equipment:   - Apply yellow socks and bracelet for high fall risk patients  - Consider moving patient to room near nurses station  Outcome: Progressing     Problem: DISCHARGE PLANNING  Goal: Discharge to home or other facility with appropriate resources  Description: INTERVENTIONS:  - Identify barriers to discharge w/patient and caregiver  - Arrange for needed discharge resources and transportation as appropriate  - Identify discharge learning needs (meds, wound care, etc.)  - Arrange for interpretive services to assist at discharge as needed  - Refer to Case Management Department for coordinating discharge planning if the patient needs post-hospital services based on physician/advanced practitioner order or complex needs related to functional status, cognitive ability, or social support system  Outcome: Progressing     Problem: Knowledge Deficit  Goal: Patient/family/caregiver demonstrates understanding of disease process, treatment plan, medications, and discharge instructions  Description: Complete learning assessment and assess knowledge base.   Interventions:  - Provide teaching at level of understanding  - Provide teaching via preferred learning methods  Outcome: Progressing     Problem: GENITOURINARY - ADULT  Goal: Maintains or returns to baseline urinary function  Description: INTERVENTIONS:  - Assess urinary function  - Encourage oral fluids to ensure adequate hydration if ordered  - Administer IV fluids as ordered to ensure adequate hydration  - Administer ordered medications as needed  - Offer frequent toileting  - Follow urinary retention protocol if ordered  Outcome: Progressing     Problem: GENITOURINARY - ADULT  Goal: Absence of urinary retention  Description: INTERVENTIONS:  - Assess patient’s ability to void and empty bladder  - Monitor I/O  - Bladder scan as needed  - Discuss with physician/AP medications to alleviate retention as needed  - Discuss catheterization for long term situations as appropriate  Outcome: Progressing     Problem: GENITOURINARY - ADULT  Goal: Urinary catheter remains patent  Description: INTERVENTIONS:  - Assess patency of urinary catheter  - If patient has a chronic vazquez, consider changing catheter if non-functioning  - Follow guidelines for intermittent irrigation of non-functioning urinary catheter  Outcome: Progressing     Problem: METABOLIC, FLUID AND ELECTROLYTES - ADULT  Goal: Glucose maintained within target range  Description: INTERVENTIONS:  - Monitor Blood Glucose as ordered  - Assess for signs and symptoms of hyperglycemia and hypoglycemia  - Administer ordered medications to maintain glucose within target range  - Assess nutritional intake and initiate nutrition service referral as needed  Outcome: Progressing     Problem: SKIN/TISSUE INTEGRITY - ADULT  Goal: Skin Integrity remains intact(Skin Breakdown Prevention)  Description: Assess:  -Perform Henry assessment every shift.  -Clean and moisturize skin every shift.  -Inspect skin when repositioning, toileting, and assisting with ADLS  -Assess under medical devices such as PEG tube site  every shift.  -Assess extremities for adequate circulation and sensation     Bed Management:  -Have minimal linens on bed & keep smooth, unwrinkled  -Change linens as needed when moist or perspiring  -Avoid sitting or lying in one position for more than 2 hours while in bed  -Keep HOB at 60 degrees     Toileting:  -Assess for incontinence every shift.  -Use incontinent care products after each incontinent episode such as moisture barrier.     Activity:  -Encourage or provide ROM exercises   -Turn and reposition patient every 2 Hours  -Use appropriate equipment to lift or move patient in bed  -Instruct/ Assist with weight shifting every 2 when out of bed in chair  -Consider limitation of chair time 2 hour intervals    Skin Care:  -Avoid use of baby powder, tape, friction and shearing, hot water or constrictive clothing  -Relieve pressure over bony prominences using wedge  -Do not massage red bony areas    Next Steps:   -Consider consults to  interdisciplinary teams such as Wound Care Team, Nutrition team.  Outcome: Progressing     Problem: SKIN/TISSUE INTEGRITY - ADULT  Goal: Pressure injury heals and does not worsen  Description: Interventions:  - Implement low air loss mattress or specialty surface (Criteria met)  - Apply silicone foam dressing  - Instruct/assist with weight shifting every 120 minutes when in chair   - Limit chair time to 2 hour intervals  - Use special pressure reducing interventions such as waffle cushion  when in chair   - Apply fecal or urinary incontinence containment device   - Perform passive or active ROM every shift.  - Turn and reposition patient & offload bony prominences every 2 hours   - Utilize friction reducing device or surface for transfers   - Consider consults to  interdisciplinary teams such as Wound Care Team  - Use incontinent care products after each incontinent episode such as moisture barrier.   - Consider nutrition services referral as needed  Outcome: Progressing

## 2023-10-19 NOTE — PLAN OF CARE
Problem: RESPIRATORY - ADULT  Goal: Achieves optimal ventilation and oxygenation  Description: INTERVENTIONS:  - Assess for changes in respiratory status  - Assess for changes in mentation and behavior  - Position to facilitate oxygenation and minimize respiratory effort  - Oxygen administered by appropriate delivery if ordered  - Initiate smoking cessation education as indicated  - Encourage broncho-pulmonary hygiene including cough, deep breathe, Incentive Spirometry  - Assess the need for suctioning and aspirate as needed  - Assess and instruct to report SOB or any respiratory difficulty  - Respiratory Therapy support as indicated  - Nebulizer  Outcome: Progressing

## 2023-10-20 PROBLEM — R07.9 CHEST PAIN: Status: RESOLVED | Noted: 2023-10-16 | Resolved: 2023-10-20

## 2023-10-20 LAB
ALBUMIN SERPL BCP-MCNC: 2.7 G/DL (ref 3.5–5)
ALP SERPL-CCNC: 44 U/L (ref 34–104)
ALT SERPL W P-5'-P-CCNC: 3 U/L (ref 7–52)
ANION GAP SERPL CALCULATED.3IONS-SCNC: 7 MMOL/L
AST SERPL W P-5'-P-CCNC: 12 U/L (ref 13–39)
BASOPHILS # BLD AUTO: 0.04 THOUSANDS/ÂΜL (ref 0–0.1)
BASOPHILS NFR BLD AUTO: 1 % (ref 0–1)
BILIRUB SERPL-MCNC: 0.19 MG/DL (ref 0.2–1)
BUN SERPL-MCNC: 36 MG/DL (ref 5–25)
CALCIUM ALBUM COR SERPL-MCNC: 9.5 MG/DL (ref 8.3–10.1)
CALCIUM SERPL-MCNC: 8.5 MG/DL (ref 8.4–10.2)
CHLORIDE SERPL-SCNC: 108 MMOL/L (ref 96–108)
CO2 SERPL-SCNC: 24 MMOL/L (ref 21–32)
CREAT SERPL-MCNC: 1.88 MG/DL (ref 0.6–1.3)
EOSINOPHIL # BLD AUTO: 0.4 THOUSAND/ÂΜL (ref 0–0.61)
EOSINOPHIL NFR BLD AUTO: 7 % (ref 0–6)
ERYTHROCYTE [DISTWIDTH] IN BLOOD BY AUTOMATED COUNT: 16.9 % (ref 11.6–15.1)
GFR SERPL CREATININE-BSD FRML MDRD: 32 ML/MIN/1.73SQ M
GLUCOSE SERPL-MCNC: 109 MG/DL (ref 65–140)
HCT VFR BLD AUTO: 27.5 % (ref 36.5–49.3)
HGB BLD-MCNC: 8.5 G/DL (ref 12–17)
IMM GRANULOCYTES # BLD AUTO: 0.01 THOUSAND/UL (ref 0–0.2)
IMM GRANULOCYTES NFR BLD AUTO: 0 % (ref 0–2)
LYMPHOCYTES # BLD AUTO: 1.73 THOUSANDS/ÂΜL (ref 0.6–4.47)
LYMPHOCYTES NFR BLD AUTO: 28 % (ref 14–44)
MAGNESIUM SERPL-MCNC: 1.8 MG/DL (ref 1.9–2.7)
MCH RBC QN AUTO: 27 PG (ref 26.8–34.3)
MCHC RBC AUTO-ENTMCNC: 30.9 G/DL (ref 31.4–37.4)
MCV RBC AUTO: 87 FL (ref 82–98)
MONOCYTES # BLD AUTO: 0.49 THOUSAND/ÂΜL (ref 0.17–1.22)
MONOCYTES NFR BLD AUTO: 8 % (ref 4–12)
NEUTROPHILS # BLD AUTO: 3.47 THOUSANDS/ÂΜL (ref 1.85–7.62)
NEUTS SEG NFR BLD AUTO: 56 % (ref 43–75)
NRBC BLD AUTO-RTO: 0 /100 WBCS
PLATELET # BLD AUTO: 248 THOUSANDS/UL (ref 149–390)
PMV BLD AUTO: 9.2 FL (ref 8.9–12.7)
POTASSIUM SERPL-SCNC: 4.4 MMOL/L (ref 3.5–5.3)
PROT SERPL-MCNC: 6.1 G/DL (ref 6.4–8.4)
RBC # BLD AUTO: 3.15 MILLION/UL (ref 3.88–5.62)
SODIUM SERPL-SCNC: 139 MMOL/L (ref 135–147)
WBC # BLD AUTO: 6.14 THOUSAND/UL (ref 4.31–10.16)

## 2023-10-20 PROCEDURE — 83735 ASSAY OF MAGNESIUM: CPT

## 2023-10-20 PROCEDURE — 94640 AIRWAY INHALATION TREATMENT: CPT

## 2023-10-20 PROCEDURE — 99232 SBSQ HOSP IP/OBS MODERATE 35: CPT | Performed by: FAMILY MEDICINE

## 2023-10-20 PROCEDURE — 85025 COMPLETE CBC W/AUTO DIFF WBC: CPT

## 2023-10-20 PROCEDURE — 99497 ADVNCD CARE PLAN 30 MIN: CPT | Performed by: FAMILY MEDICINE

## 2023-10-20 PROCEDURE — 80053 COMPREHEN METABOLIC PANEL: CPT

## 2023-10-20 PROCEDURE — 94760 N-INVAS EAR/PLS OXIMETRY 1: CPT

## 2023-10-20 RX ORDER — MAGNESIUM SULFATE 1 G/100ML
1 INJECTION INTRAVENOUS ONCE
Status: COMPLETED | OUTPATIENT
Start: 2023-10-20 | End: 2023-10-21

## 2023-10-20 RX ADMIN — OXYBUTYNIN CHLORIDE 5 MG: 5 TABLET ORAL at 22:31

## 2023-10-20 RX ADMIN — OXYBUTYNIN CHLORIDE 5 MG: 5 TABLET ORAL at 16:45

## 2023-10-20 RX ADMIN — NYSTATIN: 100000 POWDER TOPICAL at 16:53

## 2023-10-20 RX ADMIN — LATANOPROST 1 DROP: 50 SOLUTION OPHTHALMIC at 22:31

## 2023-10-20 RX ADMIN — FAMOTIDINE 20 MG: 20 TABLET ORAL at 08:27

## 2023-10-20 RX ADMIN — OXYBUTYNIN CHLORIDE 5 MG: 5 TABLET ORAL at 08:27

## 2023-10-20 RX ADMIN — BUDESONIDE 0.25 MG: 0.25 INHALANT ORAL at 07:13

## 2023-10-20 RX ADMIN — MAGNESIUM SULFATE HEPTAHYDRATE 1 G: 1 INJECTION, SOLUTION INTRAVENOUS at 06:39

## 2023-10-20 RX ADMIN — BUDESONIDE 0.25 MG: 0.25 INHALANT ORAL at 20:33

## 2023-10-20 RX ADMIN — Medication 250 MG: at 17:06

## 2023-10-20 RX ADMIN — LAMOTRIGINE 25 MG: 25 TABLET ORAL at 17:06

## 2023-10-20 RX ADMIN — IPRATROPIUM BROMIDE AND ALBUTEROL SULFATE 3 ML: 2.5; .5 SOLUTION RESPIRATORY (INHALATION) at 13:56

## 2023-10-20 RX ADMIN — MIRTAZAPINE 15 MG: 15 TABLET, FILM COATED ORAL at 22:31

## 2023-10-20 RX ADMIN — Medication 250 MG: at 08:27

## 2023-10-20 RX ADMIN — SERTRALINE 100 MG: 100 TABLET, FILM COATED ORAL at 08:27

## 2023-10-20 RX ADMIN — CARBIDOPA AND LEVODOPA 1 TABLET: 25; 100 TABLET ORAL at 16:45

## 2023-10-20 RX ADMIN — NYSTATIN: 100000 POWDER TOPICAL at 22:31

## 2023-10-20 RX ADMIN — FOLIC ACID 1 MG: 1 TABLET ORAL at 08:27

## 2023-10-20 RX ADMIN — LAMOTRIGINE 25 MG: 25 TABLET ORAL at 08:27

## 2023-10-20 RX ADMIN — LABETALOL HYDROCHLORIDE 200 MG: 100 TABLET, FILM COATED ORAL at 20:11

## 2023-10-20 RX ADMIN — IPRATROPIUM BROMIDE AND ALBUTEROL SULFATE 3 ML: 2.5; .5 SOLUTION RESPIRATORY (INHALATION) at 07:13

## 2023-10-20 RX ADMIN — LABETALOL HYDROCHLORIDE 200 MG: 100 TABLET, FILM COATED ORAL at 11:21

## 2023-10-20 RX ADMIN — IPRATROPIUM BROMIDE AND ALBUTEROL SULFATE 3 ML: 2.5; .5 SOLUTION RESPIRATORY (INHALATION) at 20:33

## 2023-10-20 RX ADMIN — NYSTATIN: 100000 POWDER TOPICAL at 08:40

## 2023-10-20 RX ADMIN — AMLODIPINE BESYLATE 10 MG: 10 TABLET ORAL at 08:27

## 2023-10-20 RX ADMIN — CARBIDOPA AND LEVODOPA 1 TABLET: 25; 100 TABLET ORAL at 22:31

## 2023-10-20 RX ADMIN — LABETALOL HYDROCHLORIDE 200 MG: 100 TABLET, FILM COATED ORAL at 03:47

## 2023-10-20 RX ADMIN — CARBIDOPA AND LEVODOPA 1 TABLET: 25; 100 TABLET ORAL at 08:27

## 2023-10-20 NOTE — PLAN OF CARE
Problem: GENITOURINARY - ADULT  Goal: Urinary catheter remains patent  Description: INTERVENTIONS:  - Assess patency of urinary catheter  - If patient has a chronic vazquez, consider changing catheter if non-functioning  - Follow guidelines for intermittent irrigation of non-functioning urinary catheter  Outcome: Progressing     Problem: SAFETY ADULT  Goal: Patient will remain free of falls  Description: INTERVENTIONS:  - Educate patient/family on patient safety including physical limitations  - Instruct patient to call for assistance with activity   - Consult OT/PT to assist with strengthening/mobility   - Keep Call bell within reach  - Keep bed low and locked with side rails adjusted as appropriate  - Keep care items and personal belongings within reach  - Initiate and maintain comfort rounds  - Make Fall Risk Sign visible to staff  - Offer Toileting every 2 Hours, in advance of need  - Initiate/Maintain bed/chair alarm  - Obtain necessary fall risk management equipment: fall risk sign  - Apply yellow socks and bracelet for high fall risk patients  - Consider moving patient to room near nurses station  Outcome: Progressing     Problem: MOBILITY - ADULT  Goal: Maintain or return to baseline ADL function  Description: INTERVENTIONS:  -  Assess patient's ability to carry out ADLs; assess patient's baseline for ADL function and identify physical deficits which impact ability to perform ADLs (bathing, care of mouth/teeth, toileting, grooming, dressing, etc.)  - Assess/evaluate cause of self-care deficits   - Assess range of motion  - Assess patient's mobility; develop plan if impaired  - Assess patient's need for assistive devices and provide as appropriate  - Encourage maximum independence but intervene and supervise when necessary  - Involve family in performance of ADLs  - Assess for home care needs following discharge   - Consider OT consult to assist with ADL evaluation and planning for discharge  - Provide patient education as appropriate  Outcome: Progressing

## 2023-10-20 NOTE — PLAN OF CARE
Problem: RESPIRATORY - ADULT  Goal: Achieves optimal ventilation and oxygenation  Description: INTERVENTIONS:  - Assess for changes in respiratory status  - Assess for changes in mentation and behavior  - Position to facilitate oxygenation and minimize respiratory effort  - Oxygen administered by appropriate delivery if ordered  - Initiate smoking cessation education as indicated  - Encourage broncho-pulmonary hygiene including cough, deep breathe, Incentive Spirometry  - Assess the need for suctioning and aspirate as needed  - Assess and instruct to report SOB or any respiratory difficulty  - Respiratory Therapy support as indicated  - Nebulizer  Outcome: Progressing     Problem: MOBILITY - ADULT  Goal: Maintain or return to baseline ADL function  Description: INTERVENTIONS:  -  Assess patient's ability to carry out ADLs; assess patient's baseline for ADL function and identify physical deficits which impact ability to perform ADLs (bathing, care of mouth/teeth, toileting, grooming, dressing, etc.)  - Assess/evaluate cause of self-care deficits   - Assess range of motion  - Assess patient's mobility; develop plan if impaired  - Assess patient's need for assistive devices and provide as appropriate  - Encourage maximum independence but intervene and supervise when necessary  - Involve family in performance of ADLs  - Assess for home care needs following discharge   - Consider OT consult to assist with ADL evaluation and planning for discharge  - Provide patient education as appropriate  Outcome: Progressing  Goal: Maintains/Returns to pre admission functional level  Description: INTERVENTIONS:  - Perform BMAT or MOVE assessment daily.   - Set and communicate daily mobility goal to care team and patient/family/caregiver. - Collaborate with rehabilitation services on mobility goals if consulted  - Perform Range of Motion  times a day. - Reposition patient every  hours.   - Dangle patient  times a day  - Stand patient  times a day  - Ambulate patient  times a day  - Out of bed to chair  times a day   - Out of bed for meals  times a day  - Out of bed for toileting  - Record patient progress and toleration of activity level   Outcome: Progressing     Problem: Prexisting or High Potential for Compromised Skin Integrity  Goal: Skin integrity is maintained or improved  Description: INTERVENTIONS:  - Identify patients at risk for skin breakdown  - Assess and monitor skin integrity  - Assess and monitor nutrition and hydration status  - Monitor labs   - Assess for incontinence   - Turn and reposition patient  - Assist with mobility/ambulation  - Relieve pressure over bony prominences  - Avoid friction and shearing  - Provide appropriate hygiene as needed including keeping skin clean and dry  - Evaluate need for skin moisturizer/barrier cream  - Collaborate with interdisciplinary team   - Patient/family teaching  - Consider wound care consult   Outcome: Progressing     Problem: Nutrition/Hydration-ADULT  Goal: Nutrient/Hydration intake appropriate for improving, restoring or maintaining nutritional needs  Description: Monitor and assess patient's nutrition/hydration status for malnutrition. Collaborate with interdisciplinary team and initiate plan and interventions as ordered. Monitor patient's weight and dietary intake as ordered or per policy. Utilize nutrition screening tool and intervene as necessary. Determine patient's food preferences and provide high-protein, high-caloric foods as appropriate.      INTERVENTIONS:  - Monitor intake, urinary output, labs, and treatment plans  - Assess nutrition and hydration status and recommend course of action  - Recommend/ encourage appropriate nutritional supplements, and vitamin/mineral supplements  - Recommend, monitor, and adjust tube feedings based on assessed needs  - Assess need for intravenous fluids  - Provide specific nutrition/hydration education as appropriate  - Include patient/family/caregiver in decisions related to nutrition  Outcome: Progressing     Problem: INFECTION - ADULT  Goal: Absence or prevention of progression during hospitalization  Description: INTERVENTIONS:  - Assess and monitor for signs and symptoms of infection  - Monitor lab/diagnostic results  - Monitor all insertion sites, i.e. indwelling lines, tubes, and drains  - Monitor endotracheal if appropriate and nasal secretions for changes in amount and color  - [place] appropriate cooling/warming therapies per order  - Administer medications as ordered  - Instruct and encourage patient and family to use good hand hygiene technique  - Identify and instruct in appropriate isolation precautions for identified infection/condition  Outcome: Progressing  Goal: Absence of fever/infection during neutropenic period  Description: INTERVENTIONS:  - Monitor WBC    Outcome: Progressing     Problem: SAFETY ADULT  Goal: Patient will remain free of falls  Description: INTERVENTIONS:  - Educate patient/family on patient safety including physical limitations  - Instruct patient to call for assistance with activity   - Consult OT/PT to assist with strengthening/mobility   - Keep Call bell within reach  - Keep bed low and locked with side rails adjusted as appropriate  - Keep care items and personal belongings within reach  - Initiate and maintain comfort rounds  - Make Fall Risk Sign visible to staff  - Offer Toileting every  Hours, in advance of need  - Initiate/Maintain alarm  - Obtain necessary fall risk management equipment:   - Apply yellow socks and bracelet for high fall risk patients  - Consider moving patient to room near nurses station  Outcome: Progressing     Problem: DISCHARGE PLANNING  Goal: Discharge to home or other facility with appropriate resources  Description: INTERVENTIONS:  - Identify barriers to discharge w/patient and caregiver  - Arrange for needed discharge resources and transportation as appropriate  - Identify discharge learning needs (meds, wound care, etc.)  - Arrange for interpretive services to assist at discharge as needed  - Refer to Case Management Department for coordinating discharge planning if the patient needs post-hospital services based on physician/advanced practitioner order or complex needs related to functional status, cognitive ability, or social support system  Outcome: Progressing     Problem: Knowledge Deficit  Goal: Patient/family/caregiver demonstrates understanding of disease process, treatment plan, medications, and discharge instructions  Description: Complete learning assessment and assess knowledge base.   Interventions:  - Provide teaching at level of understanding  - Provide teaching via preferred learning methods  Outcome: Progressing     Problem: GENITOURINARY - ADULT  Goal: Maintains or returns to baseline urinary function  Description: INTERVENTIONS:  - Assess urinary function  - Encourage oral fluids to ensure adequate hydration if ordered  - Administer IV fluids as ordered to ensure adequate hydration  - Administer ordered medications as needed  - Offer frequent toileting  - Follow urinary retention protocol if ordered  Outcome: Progressing  Goal: Absence of urinary retention  Description: INTERVENTIONS:  - Assess patient’s ability to void and empty bladder  - Monitor I/O  - Bladder scan as needed  - Discuss with physician/AP medications to alleviate retention as needed  - Discuss catheterization for long term situations as appropriate  Outcome: Progressing  Goal: Urinary catheter remains patent  Description: INTERVENTIONS:  - Assess patency of urinary catheter  - If patient has a chronic vazqeuz, consider changing catheter if non-functioning  - Follow guidelines for intermittent irrigation of non-functioning urinary catheter  Outcome: Progressing     Problem: METABOLIC, FLUID AND ELECTROLYTES - ADULT  Goal: Glucose maintained within target range  Description: INTERVENTIONS:  - Monitor Blood Glucose as ordered  - Assess for signs and symptoms of hyperglycemia and hypoglycemia  - Administer ordered medications to maintain glucose within target range  - Assess nutritional intake and initiate nutrition service referral as needed  Outcome: Progressing     Problem: SKIN/TISSUE INTEGRITY - ADULT  Goal: Skin Integrity remains intact(Skin Breakdown Prevention)  Description: Assess:  -Perform Henry assessment every   -Clean and moisturize skin every   -Inspect skin when repositioning, toileting, and assisting with ADLS  -Assess under medical devices such as  every   -Assess extremities for adequate circulation and sensation     Bed Management:  -Have minimal linens on bed & keep smooth, unwrinkled  -Change linens as needed when moist or perspiring  -Avoid sitting or lying in one position for more than  hours while in bed  -Keep HOB at degrees     Toileting:  -Offer bedside commode  -Assess for incontinence every   -Use incontinent care products after each incontinent episode such as     Activity:  -Mobilize patient  times a day  -Encourage activity and walks on unit  -Encourage or provide ROM exercises   -Turn and reposition patient every  Hours  -Use appropriate equipment to lift or move patient in bed  -Instruct/ Assist with weight shifting every  when out of bed in chair  -Consider limitation of chair time  hour intervals    Skin Care:  -Avoid use of baby powder, tape, friction and shearing, hot water or constrictive clothing  -Relieve pressure over bony prominences using   -Do not massage red bony areas    Next Steps:  -Teach patient strategies to minimize risks such as    -Consider consults to  interdisciplinary teams such as   Outcome: Progressing  Goal: Pressure injury heals and does not worsen  Description: Interventions:  - Implement low air loss mattress or specialty surface (Criteria met)  - Apply silicone foam dressing  - Instruct/assist with weight shifting every  minutes when in chair   - Limit chair time to  hour intervals  - Use special pressure reducing interventions such as  when in chair   - Apply fecal or urinary incontinence containment device   - Perform passive or active ROM every   - Turn and reposition patient & offload bony prominences every  hours   - Utilize friction reducing device or surface for transfers   - Consider consults to  interdisciplinary teams such as   - Use incontinent care products after each incontinent episode such as  - Consider nutrition services referral as needed  Outcome: Progressing

## 2023-10-20 NOTE — ACP (ADVANCE CARE PLANNING)
Urine our thing at 11. When she discharges but then because today with reported diarrhea get a monitor for admissions to advanced Care Planning Progress Note    Serious Illness Conversation    1. What is your understanding now of where you are with your illness? Daughter has appropriate understanding of prognosis     2. How much information about what is likely to be ahead with your illness would you like to have? Daughter would like to be fully informed     3. What did you (clinician) communicate to the patient? Discussed with daughter that given his chronic medical problems and acute medical issues, patient seems to be declining and may get sick again and have to return back to the hospital     4. If your health situation worsens, what are your most important goals? Daughter would want her father to be pain-free, comfortable if he were to continue to decline     5. What are the biggest fears and worries about the future and your health? 6. What abilities are so critical to your life that you cannot imagine living without them? 7. What gives you strength as you think about the future with your illness? 8. If you become sicker, how much are you willing to go through for the possibility of gaining more time? Be in the hospital: Yes    Be on a ventilator: No Be uncomfortable: No   Be on dialysis: No Undergo aggressive test and/or procedures: No   9. How much does your proxy and family know about your priorities and wishes? Daughter states, patient had outlined his wishes in the living well ahead of time     Leah Doshi heard you say that you have noticed your father is declining as well and if he continues to decline, keeping him comfortable, pain-free is really important to you. Keeping that in mind, and what we know about his illness, I recommend that we get him home and would strongly encourage you to consider hospice given his decline.   Daughter states that she is coming around to the idea of hospice and will discuss it with his VNA agency as they do provide hospice services as well. Daughter would also like to talk to the NP who does home visits for him. This will help us make sure that your treatment plans reflect what’s important to you. How does this plan sound to you? I will do everything I can to help you through this.   Patient verbalized understanding of the plan     I have spent 30 minutes speaking with my patient on advanced care planning today or during this visit     Advanced directives         Emily Powers, DO

## 2023-10-20 NOTE — CASE MANAGEMENT
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 ([address]) 10/06/2023    Chronic deep vein thrombosis (DVT) of femoral vein of both lower extremities ([address]) 10/06/2023    Functional quadriplegia ([address]) 10/02/2023    Gross hematuria 2023    Type 2 diabetes mellitus without complication, without long-term current use of insulin ([address]) 2023    Encounter for general adult medical examination with abnormal findings 2023    Catheter-associated urinary tract infection  2023    PD (Parkinson's disease) 2023    Hypertension 2023    Chronic kidney disease 2023    Severe dementia ([address]) 2023    Dysphagia 2023    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[address]) 2023    Bedbound 2023    Pressure injury of skin of right hip 2023    Skin ulcer of sacrum ([address]) 2023    H/O left mastectomy 2023    History of prostate cancer 2023    Arm DVT (deep venous thromboembolism), acute, left ([address]) 2023    Indwelling Lieberman catheter present 2023    Chronic obstructive pulmonary disease ([address]) 2023    Constipation 2023    Gastroesophageal reflux disease 2023    Glaucoma of both eyes 2023    Insomnia 2023    Fungal rash of torso 2023    Dry eyes 2023    Depression 2023    Benign prostatic hyperplasia 2023    Gram-positive bacteremia 7355    Acute metabolic encephalopathy     Hypernatremia 2023 Malignant neoplasm of central portion of left male breast ([address]) 07/04/2023    UTI (urinary tract infection) due to urinary indwelling Vazquez catheter  07/04/2023      LOS (days): 4  Geometric Mean LOS (GMLOS) (days): 3.90  Days to GMLOS:0     OBJECTIVE:  Risk of Unplanned Readmission Score: 32.24     Current admission status: Inpatient   Preferred Pharmacy:   [facility], [address] [facility] [address]   [address] [facility] [address]  Phone: [phone] Fax: [phone]    Primary Care Provider: ROSITA Monae    Primary Insurance: MEDICARE  Secondary Insurance: BLUE CROSS    DISCHARGE DETAILS:    Discharge planning discussed with[de-identified] Rocio Valiente, daughter  Freedom of Choice: Yes  Comments - Freedom of Choice: SW following to assist with planning. Per [facility] pt is stable for discharge. SW met with pt's daughter to review plans, transportation time and IMM. Daughter is planning on pt returning home with [facility] [address] [employer]. SW discussed transportation time with daughter, 9:00 pm. Daughter feels transport time is too late and requested discharge earlier tomorrow. SW will contact [facility] to discuss situation. [facility] team had requested SW provide daughter with information for non-emergent transportation to Dr. Powell Ish office if needed. SW provided daughter with name of one transportation company, [employer], that may be able to assist.  Daughter expressed concern about going out to appointments and stated that is why they have ROSITA Monae from [facility] doing home visits. SW explained urology concern about vazquez changes. Daughter said she will talk with physicians about that need. Daughter waiting to meet with [facility] Ortega now. SW offered to follow up with daughter about transportation timing.   CM contacted family/caregiver?: Yes  Were Treatment Team discharge recommendations reviewed with patient/caregiver?: Yes  Did patient/caregiver verbalize understanding of patient care needs?: Yes  Were patient/caregiver advised of the risks associated with not following Treatment Team discharge recommendations?: Yes    Contacts  Patient Contacts: Holden Gomez (daughter)  Relationship to Patient[de-identified] Family  Contact Method: In Person  Reason/Outcome: Discharge Planning    Other Referral/Resources/Interventions Provided:  Interventions: Transportation, Transportation to treatment, [facility]  Referral Comments: JENSEN placed call to [facility] to discuss transportation timing. Spoke with Rachel. Rachel indicated due to staffing the transportation time for this evening cannot be changed to earlier. Rachel said [facility] can do it tomorrow morning. JENSEN will change transport request to tomorrow morning and notify [facility]. Treatment Team Recommendation: Home with [address]   Discharge Destination Plan[de-identified] Home with [address] at Discharge : [facility]  Dispatcher Contacted: Yes  Number/Name of Dispatcher: [address] [facility] [employer] and Unit #): [facility]  ETA of Transport (Date): 10/21/23  ETA of Transport (Time):  (1000)    IMM Given (Date):: 10/20/23  IMM Given to[de-identified] Family (IMM #2 reviewed with daughter. Daughter verbalized understanding and agrees with discharge determination. Daughter signed IMM and copy given.  Copy also placed in scan bin for chart.)

## 2023-10-20 NOTE — DISCHARGE INSTR - AVS FIRST PAGE
STOP Eliquis  STOP polyethylene glycol 17gp packet due to Loose bowel movement/diarrhea  Take polyethylene glycol 17gm as needed only if becomes constipated  STOP senna 8.8ml syrup due to Loose bowel movement/diarrhea  If loose bowel movements are resolved, re-start the patient on polyethylene glycol and sennosides.   Outpatient urology follow-up appointment with Dr. Alirio Espinal

## 2023-10-20 NOTE — DISCHARGE SUMMARY
Discharge Summary - [address] [place] [address] Family Medicine Residency     Patient Information: Dani Santana 80 y.o. male MRN: [id number]  Unit/Bed#: [address] [place] [address] Encounter: [id number]     Admitting Physician: Shasha Naranjo DO  Discharging Physician/Practitioner: No att. providers found   PCP: ROSITA Monae  Admission Date:   Admission Orders (From admission, onward)       Ordered        10/16/23 1648  INPATIENT ADMISSION  Once                          Discharge Date: 10/21/23      Reason for Admission: Dehydration [E86.0]  UTI (urinary tract infection) [N39.0]  Chest pain [R07.9]  Abdominal pain [R10.9]  CKD (chronic kidney disease) [N18.9]  Generalized weakness [R53.1]     Discharge Diagnoses:      Principal Problem (Resolved):    Chest pain  Active Problems:    Hypertension    Abnormal urinalysis    PD (Parkinson's disease)    Stage 3 chronic kidney disease (HCC)    Severe dementia (HCC)    Dysphagia    Chronic obstructive pulmonary disease (HCC)    Constipation    Gastroesophageal reflux disease    Dry eyes    Depression    Functional quadriplegia (HCC)    Chronic deep vein thrombosis (DVT) of femoral vein of both lower extremities ([address])       Consultations During Hospital Stay:  ·       Urology and wound care     Procedures Performed:   ·       None     Significant Findings / Test Results:   10/20: WBC 6.14, hemoglobin 8.5, creatinine 1.88, magnesium 1.8, ,K 4.4, cl 108, AST 3, Alb 2.7  10/19:WBC 6.75, Hgb 8.5, Cr 2.12, K 4.0  10/17: WBC 6.83,Cr 2.49, Hgb 8.9,K 4.1,  10/16: Cr 2.34, BUN 32, K 4.4, Alb 3.2, LA 2.1, UA Large Leuk, Large Oc blood; UM innumerable RBC, WBC, Bacteria;  Trop 0 hr 21, 2 hr 18, Delta 2 hr -3, 4 hr 18, Delta 4 hr -3, Mag 1.8, WBC 7.04, Hgb 10.3, Plt 334, Lipase 34      Incidental Findings:   ·       None     Test Results Pending at Discharge (will require follow up):   ·       CBC, BMP      Outpatient Tests Requested:  ·       None     Outpatient follow-up Requested:  ·       Urology          PCP    Complications:  None    Things to address at first visit after hospitalization     Does the patient had any blood in urine? Patient follow-up with urology for Lieberman catheter to be changed? Is the patient have any chest or abdominal pain? Make sure he has discontinued his eliquis      Hospital Course:     Tiki Guardado is a 80 y.o. male patient w/pmh prostate cancer s/p radical prostatectomy, breast cancer s/p mastectomy, IVC filter, CKD, hypertension, dementia, Parkinson's, depression  who originally presented to the hospital on 10/16/2023 for evaluation of chest pain and shortness of breath. Patient is minimally verbal and poor historian. All history is obtained from the daughter, who is his primary caregiver at her home. Troponins were negative and EKG was negative for ischemia; with normal EF. Patient has IVC filter in place given his history of chronic DVTs. During his course in the hospital, patient developed gross hematuria along with decreased urine output. Scan was done which showed greater than 500 ml urinary retention. A Lieberman catheter was then placed due to urinary retention. Culture showed ESBL Proteus. Eliquis was restarted and the patient developed hematuria again. After discussion with daughter, may discontinue Eliquis indefinitely because of recurrent hematuria on it. Patient will continue with Lieberman catheter on discharge. Condition at Discharge: stable      Discharge Day Visit / Exam:      Vitals: Blood Pressure: 128/59 (10/21/23 0804)  Pulse: 70 (10/21/23 0804)  Temperature: 99.3 °F (37.4 °C) (10/21/23 0804)  Temp Source: Axillary (10/21/23 0804)  Respirations: 16 (10/21/23 0804)  Height: 6' 1" (185.4 cm) (10/17/23 1017)  Weight - Scale: 84.2 kg (185 lb 10 oz) (10/17/23 1017)  SpO2: 97 % (10/21/23 0804)    Review of Systems   Constitutional:  Negative for chills and fever. HENT:  Negative for ear pain and sore throat.     Eyes: Negative for pain and visual disturbance. Respiratory:  Negative for cough and shortness of breath. Cardiovascular:  Negative for chest pain and palpitations. Gastrointestinal:  Negative for abdominal distention, abdominal pain and vomiting. Endocrine: Negative. Genitourinary:  Negative for dysuria and hematuria. Musculoskeletal:  Negative for arthralgias and back pain. Skin:  Negative for color change and rash. Allergic/Immunologic: Positive for immunocompromised state. Neurological:  Negative for seizures and syncope. Hematological: Negative. Psychiatric/Behavioral:  Positive for decreased concentration and sleep disturbance. Difficult to assess   All other systems reviewed and are negative. Exam:   Physical Exam  Vitals reviewed. Constitutional:       Appearance: Normal appearance. He is normal weight. Cardiovascular:      Rate and Rhythm: Normal rate and regular rhythm. Pulses: Normal pulses. Heart sounds: Normal heart sounds. No murmur heard. Pulmonary:      Effort: Pulmonary effort is normal. No respiratory distress. Breath sounds: Normal breath sounds. Abdominal:      General: Bowel sounds are normal. There is no distension. Palpations: Abdomen is soft. Tenderness: There is no abdominal tenderness. Comments: PEG tube in place   Genitourinary:     Comments: Lieberman catheter in place  Musculoskeletal:         General: Normal range of motion. Skin:     General: Skin is warm. Capillary Refill: Capillary refill takes 2 to 3 seconds. Neurological:      General: No focal deficit present. Mental Status: He is alert and oriented to person, place, and time. Motor: No weakness. Psychiatric:         Mood and Affect: Mood normal.         Behavior: Behavior normal.         Thought Content:  Thought content normal.       Problem list and assessment and plan     Chest pain  Patient 80year-old with PMH of Parkinson's, dementia, minimally verbal. According to the daughter, patient had chest pain and tightness a/s with heavy breathing, She gave him 1 treatment of albuterol and Pulmicort with no improvement. His troponins were found to be elevated, possibly due to myocardial demand, hemoglobin was 10.3 and EKG showed sinus rhythm and first-degree AV block with a QTc of 411. Patient was given nebulization treatment to help with his breathing with an antibiotic coverage. He was started on azithromycin 500 mg every 12 hourly. Echo showed normal EF. Throughout his hospital stay his work of breathing was monitored, saturation was maintained and vital signs were monitoredHypertension  Chronic, stable  BP 10/19: 130/59    Continue home medications Labetalol 200mg Q8  Continue amlodipine 10mg  Monitor blood pressure    Abnormal urinalysis  On admission his UA was positive for large leukocytes, large occult blood, small bilirubin, trace ketones and small bilirubin. His Eliquis was restarted during his hospital stay, but then had to be discontinued indefinitely after having gross hematuria along with decreased urine output. At that time bladder scan was done which showed greater than 500 mL urinary retention. This case was discussed with urology who recommended placing a Lieberman catheter. Creatinine appeared at baseline throughout and this was not acute kidney injury but chronic kidney disease. UA was abnormal but in the setting of Lieberman catheter, will always be abnormal.  Patient was recently treated with IV antibiotics on prior admission. Per chart review patient does have IVC filter in place given his history of chronic DVTs. His Urine culture: Proteus mirabilis ESBL. Patient was discharged home with [facility] instructions to monitor intake and output      Chronic deep vein thrombosis (DVT) of femoral vein of both lower extremities (HCC)  Pt has a hx of chronic DVTs w/IVC filter in place.  During last admission he was found to have Acute right lower extremity DVT in EIV and CFV. Pt was started Eliquis loading dose of 5 mg BID for 7 days and than 2.5 mg BID. Pt developed hematuria after re-starting Eliquis 10/18. Discontinue Eliquis indefinitely  Monitor signs for any new swelling on lower extremities    Gastroesophageal reflux disease  Chronic, stable  Continue home medication Pepcid 20 mg every other day    Dysphagia  On prior admission patient was unable to swallow his medications and had a risk for aspiration this was placed on G tube. Patient receives all of his enteric feeds and medications via the G-tube. Check gastrotomy tube daily, to tube care  Feeds consist of Glucerna 1.2, 75 ml/h continuous with water boluses every 4 hours with 120ml flush  Increase in water bolus due to creatinine trending up  Soft restraints, to avoid pulling out the G tube    PD (Parkinson's disease)  Chronic, stable    Pt is minimally verbal, and will sometimes answer questions. Continue carbidopa levodopa  mg 3 times daily per G-tube  Continue lamotrigine 25 mg twice daily per G-tube    Severe dementia ([address])  Chronic,  Patient has a history of progressive dementia, secondary to Parkinson's disease. Try to put on soft restraints at home to avoid pulling out the G tube and Lieberman        Chronic obstructive pulmonary disease (HCC)  Chronic, stable    Patient has a past medical history of COPD, stable on his home medications  Continue Pulmicort 0.25 twice daily  Continue DuoNebs 3 times daily    Constipation  Chronic, stable  Once patient has diarrhea, continuous use of MiraLAX should be avoided. However MiraLAX can be given if he becomes constipated.   Continue probiotics    Dry eyes  Chronic, stable  Continue artificial tears 1 drop in each eye 3 times daily as needed    Depression  Chronic, stable  Continue home medications: Mirtazapine 7.5 mg daily at bedtime, Zoloft 100 mg per G-tube daily             Stage 3 chronic kidney disease ([address])  Lab Results Component Value Date    EGFR 27 10/19/2023    EGFR 24 10/18/2023    EGFR 22 10/17/2023    CREATININE 2.12 (H) 10/19/2023    CREATININE 2.38 (H) 10/18/2023    CREATININE 2.49 (H) 10/17/2023     Pt has A hx of Stage 3 CKD, follows up with [facility] prior to the last admission. His baseline Creatinine is around 2.0 and EFGR of 20. Continue to avoid Nephrotoxic medications      Functional quadriplegia (HCC)  Pt has a chronic hx of parkinson's and functional quadriplegia secondary to severe dementia and Parkinson's a/e/b bedbound. Patient requires assistance with all of his ADLs. Contractures to be treated with increased assistance and total care. He resides with daughter who provides complete care. Continue frequent repositioning   Check feeding tubes insertion site to make sure the patient is not pulling on that    Discussion with Family: Done  Patient Information Sharing: With the consent of Raul Anand, their loved ones Jennifer Pritchard were notified today by inpatient team of the patient’s condition and current plan. All questions answered. Discharge instructions/Information to patient and family:   See after visit summary for information provided to patient and family. Discharge Medications:     Medication List      START taking these medications     magnesium Oxide 400 mg Tabs; Commonly known as: MAG-OX; Take 1 tablet   (400 mg total) by mouth daily     CHANGE how you take these medications     polyethylene glycol 17 g packet; Commonly known as: Tammy Frankston; Take 17 g   by mouth daily as needed (as needed for constipation);  What changed: how   to take this, when to take this, reasons to take this     CONTINUE taking these medications     acetaminophen 325 mg tablet; Commonly known as: TYLENOL; 2 tablets (650   mg total) by Per G Tube route every 6 (six) hours as needed for mild pain   or headaches   amLODIPine 10 mg tablet; Commonly known as: NORVASC; 1 tablet (10 mg   total) by Per G Tube route daily Do not start before October 10, 2023. ascorbic acid 250 MG tablet; Commonly known as: VITAMIN C; 1 tablet (250   mg total) by Per G Tube route daily Do not start before October 10, 2023. Blood Glucose Monitoring Suppl Supplies Misc; Use every morning Check   FBG daily   budesonide 0.25 mg/2 mL nebulizer solution; Commonly known as:   Pulmicort; Take 2 mL (0.25 mg total) by nebulization 2 (two) times a day   Rinse mouth after use. carbidopa-levodopa  mg per disintegrating tablet; Commonly known   as: PARCOPA; Take 1 tablet by mouth 3 (three) times a day   famotidine 20 mg tablet; Commonly known as: PEPCID; 1 tablet (20 mg   total) by Per G Tube route every other day Do not start before October 10,   2023. folic acid 1 mg tablet; Commonly known as: KP Folic Acid; 1 tablet (1 mg   total) by Per G Tube route daily Do not start before October 10, 2023.   hydrocortisone 2.5 % rectal cream; Commonly known as: ANUSOL-HC; Apply   topically 2 (two) times a day for 14 days   ipratropium-albuterol 0.5-2.5 mg/3 mL nebulizer solution; Commonly known   as: DUO-NEB; Take 3 mL by nebulization 3 (three) times a day   labetalol 200 mg tablet; Commonly known as: NORMODYNE; 1 tablet (200 mg   total) by Per G Tube route every 8 (eight) hours   lamoTRIgine 25 mg tablet; Commonly known as: LaMICtal; 1 tablet (25 mg   total) by Per G Tube route 2 (two) times a day   latanoprost 0.005 % ophthalmic solution; Commonly known as: XALATAN;   Administer 1 drop to both eyes daily at bedtime   melatonin 3 mg; 1 tablet (3 mg total) by Per G Tube route daily at   bedtime   mirtazapine 7.5 MG tablet; Commonly known as: REMERON; Take 1 tablet   (7.5 mg total) by mouth daily at bedtime   nystatin powder; Commonly known as: MYCOSTATIN; Apply topically 3   (three) times a day   polyvinyl alcohol 1.4 % ophthalmic solution; Commonly known as:   LIQUIFILM TEARS;  Administer 1 drop to both eyes as needed for dry eyes saccharomyces boulardii 250 mg capsule; Commonly known as: FLORASTOR; 1   capsule (250 mg total) by Per G Tube route 2 (two) times a day   sertraline 100 mg tablet; Commonly known as: ZOLOFT; 1 tablet (100 mg   total) by Per G Tube route daily Do not start before October 10, 2023. tamsulosin 0.4 mg; Commonly known as: FLOMAX; Take 1 capsule (0.4 mg   total) by mouth daily with dinner     STOP taking these medications     apixaban 2.5 mg; Commonly known as: ELIQUIS   apixaban 5 mg; Commonly known as: ELIQUIS   senna 8.8 mg/5 mL oral syrup        Disposition:   Home with VNA Services (Reminder: Complete face to face encounter)     For Discharges to [address] [place] [address] SNF:   ·       Not Applicable to this Patient - Not Applicable to this Patient     Discharge Statement:  I spent 45 minutes discharging the patient. This time was spent on the day of discharge. I had direct contact with the patient on the day of discharge. Greater than 50% of the total time was spent examining patient, answering all patient questions, arranging and discussing plan of care with patient as well as directly providing post-discharge instructions. Additional time then spent on discharge activities.      ** Please Note: This note has been constructed using a voice recognition system **     Bob Irwin MD   10/21/23  8:39 PM

## 2023-10-21 VITALS
WEIGHT: 185.63 LBS | BODY MASS INDEX: 24.6 KG/M2 | HEIGHT: 73 IN | RESPIRATION RATE: 16 BRPM | HEART RATE: 70 BPM | DIASTOLIC BLOOD PRESSURE: 59 MMHG | OXYGEN SATURATION: 97 % | TEMPERATURE: 99.3 F | SYSTOLIC BLOOD PRESSURE: 128 MMHG

## 2023-10-21 PROBLEM — L89.216: Status: ACTIVE | Noted: 2023-09-06

## 2023-10-21 LAB
ANION GAP SERPL CALCULATED.3IONS-SCNC: 5 MMOL/L
BASOPHILS # BLD AUTO: 0.08 THOUSANDS/ÂΜL (ref 0–0.1)
BASOPHILS NFR BLD AUTO: 1 % (ref 0–1)
BUN SERPL-MCNC: 38 MG/DL (ref 5–25)
CALCIUM SERPL-MCNC: 8.6 MG/DL (ref 8.4–10.2)
CHLORIDE SERPL-SCNC: 106 MMOL/L (ref 96–108)
CO2 SERPL-SCNC: 26 MMOL/L (ref 21–32)
CREAT SERPL-MCNC: 1.87 MG/DL (ref 0.6–1.3)
EOSINOPHIL # BLD AUTO: 0.46 THOUSAND/ÂΜL (ref 0–0.61)
EOSINOPHIL NFR BLD AUTO: 6 % (ref 0–6)
ERYTHROCYTE [DISTWIDTH] IN BLOOD BY AUTOMATED COUNT: 16.8 % (ref 11.6–15.1)
GFR SERPL CREATININE-BSD FRML MDRD: 32 ML/MIN/1.73SQ M
GLUCOSE SERPL-MCNC: 100 MG/DL (ref 65–140)
HCT VFR BLD AUTO: 26.7 % (ref 36.5–49.3)
HGB BLD-MCNC: 8.6 G/DL (ref 12–17)
IMM GRANULOCYTES # BLD AUTO: 0.02 THOUSAND/UL (ref 0–0.2)
IMM GRANULOCYTES NFR BLD AUTO: 0 % (ref 0–2)
LYMPHOCYTES # BLD AUTO: 1.88 THOUSANDS/ÂΜL (ref 0.6–4.47)
LYMPHOCYTES NFR BLD AUTO: 23 % (ref 14–44)
MAGNESIUM SERPL-MCNC: 1.8 MG/DL (ref 1.9–2.7)
MCH RBC QN AUTO: 27.7 PG (ref 26.8–34.3)
MCHC RBC AUTO-ENTMCNC: 32.2 G/DL (ref 31.4–37.4)
MCV RBC AUTO: 86 FL (ref 82–98)
MONOCYTES # BLD AUTO: 0.57 THOUSAND/ÂΜL (ref 0.17–1.22)
MONOCYTES NFR BLD AUTO: 7 % (ref 4–12)
NEUTROPHILS # BLD AUTO: 5.17 THOUSANDS/ÂΜL (ref 1.85–7.62)
NEUTS SEG NFR BLD AUTO: 63 % (ref 43–75)
NRBC BLD AUTO-RTO: 0 /100 WBCS
PLATELET # BLD AUTO: 243 THOUSANDS/UL (ref 149–390)
PMV BLD AUTO: 9.5 FL (ref 8.9–12.7)
POTASSIUM SERPL-SCNC: 4.6 MMOL/L (ref 3.5–5.3)
RBC # BLD AUTO: 3.11 MILLION/UL (ref 3.88–5.62)
SODIUM SERPL-SCNC: 137 MMOL/L (ref 135–147)
WBC # BLD AUTO: 8.18 THOUSAND/UL (ref 4.31–10.16)

## 2023-10-21 PROCEDURE — 94760 N-INVAS EAR/PLS OXIMETRY 1: CPT

## 2023-10-21 PROCEDURE — 85025 COMPLETE CBC W/AUTO DIFF WBC: CPT

## 2023-10-21 PROCEDURE — 83735 ASSAY OF MAGNESIUM: CPT

## 2023-10-21 PROCEDURE — 94640 AIRWAY INHALATION TREATMENT: CPT

## 2023-10-21 PROCEDURE — 80048 BASIC METABOLIC PNL TOTAL CA: CPT

## 2023-10-21 PROCEDURE — 99239 HOSP IP/OBS DSCHRG MGMT >30: CPT | Performed by: FAMILY MEDICINE

## 2023-10-21 RX ORDER — MAGNESIUM SULFATE HEPTAHYDRATE 40 MG/ML
2 INJECTION, SOLUTION INTRAVENOUS ONCE
Status: DISCONTINUED | OUTPATIENT
Start: 2023-10-21 | End: 2023-10-21

## 2023-10-21 RX ORDER — LANOLIN ALCOHOL/MO/W.PET/CERES
400 CREAM (GRAM) TOPICAL 2 TIMES DAILY
Status: DISCONTINUED | OUTPATIENT
Start: 2023-10-21 | End: 2023-10-21 | Stop reason: HOSPADM

## 2023-10-21 RX ORDER — LANOLIN ALCOHOL/MO/W.PET/CERES
400 CREAM (GRAM) TOPICAL DAILY
Qty: 30 TABLET | Refills: 0 | Status: SHIPPED | OUTPATIENT
Start: 2023-10-21

## 2023-10-21 RX ORDER — POLYETHYLENE GLYCOL 3350 17 G/17G
17 POWDER, FOR SOLUTION ORAL DAILY PRN
Qty: 30 EACH | Refills: 0
Start: 2023-10-21

## 2023-10-21 RX ADMIN — IPRATROPIUM BROMIDE AND ALBUTEROL SULFATE 3 ML: 2.5; .5 SOLUTION RESPIRATORY (INHALATION) at 07:22

## 2023-10-21 RX ADMIN — LABETALOL HYDROCHLORIDE 200 MG: 100 TABLET, FILM COATED ORAL at 03:52

## 2023-10-21 RX ADMIN — AMLODIPINE BESYLATE 10 MG: 10 TABLET ORAL at 09:35

## 2023-10-21 RX ADMIN — FOLIC ACID 1 MG: 1 TABLET ORAL at 09:35

## 2023-10-21 RX ADMIN — NYSTATIN: 100000 POWDER TOPICAL at 09:36

## 2023-10-21 RX ADMIN — Medication 250 MG: at 09:35

## 2023-10-21 RX ADMIN — CARBIDOPA AND LEVODOPA 1 TABLET: 25; 100 TABLET ORAL at 09:35

## 2023-10-21 RX ADMIN — Medication 400 MG: at 09:35

## 2023-10-21 RX ADMIN — BUDESONIDE 0.25 MG: 0.25 INHALANT ORAL at 07:21

## 2023-10-21 RX ADMIN — OXYBUTYNIN CHLORIDE 5 MG: 5 TABLET ORAL at 09:35

## 2023-10-21 RX ADMIN — LAMOTRIGINE 25 MG: 25 TABLET ORAL at 09:35

## 2023-10-21 RX ADMIN — SERTRALINE 100 MG: 100 TABLET, FILM COATED ORAL at 09:35

## 2023-10-21 NOTE — CASE MANAGEMENT
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 ([address]) 10/06/2023    Chronic deep vein thrombosis (DVT) of femoral vein of both lower extremities ([address]) 10/06/2023    Functional quadriplegia ([address]) 10/02/2023    Gross hematuria 2023    Type 2 diabetes mellitus without complication, without long-term current use of insulin ([address]) 2023    Encounter for general adult medical examination with abnormal findings 2023    Catheter-associated urinary tract infection  2023    PD (Parkinson's disease) 2023    Hypertension 2023    Chronic kidney disease 2023    Severe dementia ([address]) 2023    Dysphagia 2023    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[address]) 2023    Bedbound 2023    Pressure injury of skin of right hip 2023    Skin ulcer of sacrum ([address]) 2023    H/O left mastectomy 2023    History of prostate cancer 2023    Arm DVT (deep venous thromboembolism), acute, left ([address]) 2023    Indwelling Lieberman catheter present 2023    Chronic obstructive pulmonary disease ([address]) 2023    Constipation 2023    Gastroesophageal reflux disease 2023    Glaucoma of both eyes 2023    Insomnia 2023    Fungal rash of torso 2023    Dry eyes 2023    Depression 2023    Benign prostatic hyperplasia 2023    Gram-positive bacteremia     Acute metabolic encephalopathy 4386    Hypernatremia 2023 Malignant neoplasm of central portion of left male breast ([address]) 07/04/2023    UTI (urinary tract infection) due to urinary indwelling Lieberman catheter  07/04/2023      LOS (days): 5  Geometric Mean LOS (GMLOS) (days): 3.90  Days to GMLOS:-0.9     OBJECTIVE:  Risk of Unplanned Readmission Score: 32.88     Current admission status: Inpatient   Preferred Pharmacy:   [facility], [address] [facility] [address] [facility]  [address] [facility] [address]  Phone: [phone] Fax: [phone]    Primary Care Provider: ROSITA Monae    Primary Insurance: MEDICARE  Secondary Insurance: BLUE CROSS    DISCHARGE DETAILS:    Other Referral/Resources/Interventions Provided:  Interventions: [facility]  Referral Comments: Discharge ordered. Pt will be returning home with daughter and [facility]. Daughter inquired about possible hospice services in future after talking with physician yesterday. Daughter aware that [facility] has hospice level of care and intends to talk with agency about hospice when they resume care. Daughter requested SW notify agency of daughter's request to talk with someone as well. SW notified agency by phone and via Aidin as requested. Transport in place for pt.     Treatment Team Recommendation: Home with [address]   Discharge Destination Plan[de-identified] Home with [address] [place] at Discharge : [place]

## 2023-10-21 NOTE — PLAN OF CARE
Problem: RESPIRATORY - ADULT  Goal: Achieves optimal ventilation and oxygenation  Description: INTERVENTIONS:  - Assess for changes in respiratory status  - Assess for changes in mentation and behavior  - Position to facilitate oxygenation and minimize respiratory effort  - Oxygen administered by appropriate delivery if ordered  - Initiate smoking cessation education as indicated  - Encourage broncho-pulmonary hygiene including cough, deep breathe, Incentive Spirometry  - Assess the need for suctioning and aspirate as needed  - Assess and instruct to report SOB or any respiratory difficulty  - Respiratory Therapy support as indicated  - Nebulizer  Outcome: Progressing     Problem: Prexisting or High Potential for Compromised Skin Integrity  Goal: Skin integrity is maintained or improved  Description: INTERVENTIONS:  - Identify patients at risk for skin breakdown  - Assess and monitor skin integrity  - Assess and monitor nutrition and hydration status  - Monitor labs   - Assess for incontinence   - Turn and reposition patient  - Assist with mobility/ambulation  - Relieve pressure over bony prominences  - Avoid friction and shearing  - Provide appropriate hygiene as needed including keeping skin clean and dry  - Evaluate need for skin moisturizer/barrier cream  - Collaborate with interdisciplinary team   - Patient/family teaching  - Consider wound care consult   Outcome: Progressing     Problem: Nutrition/Hydration-ADULT  Goal: Nutrient/Hydration intake appropriate for improving, restoring or maintaining nutritional needs  Description: Monitor and assess patient's nutrition/hydration status for malnutrition. Collaborate with interdisciplinary team and initiate plan and interventions as ordered. Monitor patient's weight and dietary intake as ordered or per policy. Utilize nutrition screening tool and intervene as necessary.  Determine patient's food preferences and provide high-protein, high-caloric foods as appropriate.      INTERVENTIONS:  - Monitor intake, urinary output, labs, and treatment plans  - Assess nutrition and hydration status and recommend course of action  - Recommend/ encourage appropriate nutritional supplements, and vitamin/mineral supplements  - Recommend, monitor, and adjust tube feedings based on assessed needs  - Assess need for intravenous fluids  - Provide specific nutrition/hydration education as appropriate  - Include patient/family/caregiver in decisions related to nutrition  Outcome: Progressing     Problem: INFECTION - ADULT  Goal: Absence or prevention of progression during hospitalization  Description: INTERVENTIONS:  - Assess and monitor for signs and symptoms of infection  - Monitor lab/diagnostic results  - Monitor all insertion sites, i.e. indwelling lines, tubes, and drains  - Monitor endotracheal if appropriate and nasal secretions for changes in amount and color  - [place] appropriate cooling/warming therapies per order  - Administer medications as ordered  - Instruct and encourage patient and family to use good hand hygiene technique  - Identify and instruct in appropriate isolation precautions for identified infection/condition  Outcome: Progressing     Problem: GENITOURINARY - ADULT  Goal: Maintains or returns to baseline urinary function  Description: INTERVENTIONS:  - Assess urinary function  - Encourage oral fluids to ensure adequate hydration if ordered  - Administer IV fluids as ordered to ensure adequate hydration  - Administer ordered medications as needed  - Offer frequent toileting  - Follow urinary retention protocol if ordered  Outcome: Progressing     Problem: GENITOURINARY - ADULT  Goal: Absence of urinary retention  Description: INTERVENTIONS:  - Assess patient’s ability to void and empty bladder  - Monitor I/O  - Bladder scan as needed  - Discuss with physician/AP medications to alleviate retention as needed  - Discuss catheterization for long term situations as appropriate  Outcome: Progressing     Problem: GENITOURINARY - ADULT  Goal: Urinary catheter remains patent  Description: INTERVENTIONS:  - Assess patency of urinary catheter  - If patient has a chronic vazquez, consider changing catheter if non-functioning  - Follow guidelines for intermittent irrigation of non-functioning urinary catheter  Outcome: Progressing     Problem: METABOLIC, FLUID AND ELECTROLYTES - ADULT  Goal: Glucose maintained within target range  Description: INTERVENTIONS:  - Monitor Blood Glucose as ordered  - Assess for signs and symptoms of hyperglycemia and hypoglycemia  - Administer ordered medications to maintain glucose within target range  - Assess nutritional intake and initiate nutrition service referral as needed  Outcome: Progressing

## 2023-10-21 NOTE — NURSING NOTE
Parisarani is here to transport pt to his daughter's home [facility]. Only belongings that went with pt, Triad paste, nystatin powder, prevalon boots, and Allevyns. Pt restrained to the stretcher Lieberman remains intact at time of departure. Paperwork given to transport team.  IV removed. Leo removed.

## 2023-10-21 NOTE — PLAN OF CARE
Problem: RESPIRATORY - ADULT  Goal: Achieves optimal ventilation and oxygenation  Description: INTERVENTIONS:  - Assess for changes in respiratory status  - Assess for changes in mentation and behavior  - Position to facilitate oxygenation and minimize respiratory effort  - Oxygen administered by appropriate delivery if ordered  - Initiate smoking cessation education as indicated  - Encourage broncho-pulmonary hygiene including cough, deep breathe, Incentive Spirometry  - Assess the need for suctioning and aspirate as needed  - Assess and instruct to report SOB or any respiratory difficulty  - Respiratory Therapy support as indicated  - Nebulizer  Outcome: Progressing     Problem: MOBILITY - ADULT  Goal: Maintain or return to baseline ADL function  Description: INTERVENTIONS:  -  Assess patient's ability to carry out ADLs; assess patient's baseline for ADL function and identify physical deficits which impact ability to perform ADLs (bathing, care of mouth/teeth, toileting, grooming, dressing, etc.)  - Assess/evaluate cause of self-care deficits   - Assess range of motion  - Assess patient's mobility; develop plan if impaired  - Assess patient's need for assistive devices and provide as appropriate  - Encourage maximum independence but intervene and supervise when necessary  - Involve family in performance of ADLs  - Assess for home care needs following discharge   - Consider OT consult to assist with ADL evaluation and planning for discharge  - Provide patient education as appropriate  Outcome: Progressing  Goal: Maintains/Returns to pre admission functional level  Description: INTERVENTIONS:  - Perform BMAT or MOVE assessment daily.   - Set and communicate daily mobility goal to care team and patient/family/caregiver. - Collaborate with rehabilitation services on mobility goals if consulted  - Perform Range of Motion  times a day. - Reposition patient every  hours.   - Dangle patient  times a day  - Stand patient  times a day  - Ambulate patient  times a day  - Out of bed to chair  times a day   - Out of bed for meals  times a day  - Out of bed for toileting  - Record patient progress and toleration of activity level   Outcome: Progressing     Problem: Prexisting or High Potential for Compromised Skin Integrity  Goal: Skin integrity is maintained or improved  Description: INTERVENTIONS:  - Identify patients at risk for skin breakdown  - Assess and monitor skin integrity  - Assess and monitor nutrition and hydration status  - Monitor labs   - Assess for incontinence   - Turn and reposition patient  - Assist with mobility/ambulation  - Relieve pressure over bony prominences  - Avoid friction and shearing  - Provide appropriate hygiene as needed including keeping skin clean and dry  - Evaluate need for skin moisturizer/barrier cream  - Collaborate with interdisciplinary team   - Patient/family teaching  - Consider wound care consult   Outcome: Progressing     Problem: Nutrition/Hydration-ADULT  Goal: Nutrient/Hydration intake appropriate for improving, restoring or maintaining nutritional needs  Description: Monitor and assess patient's nutrition/hydration status for malnutrition. Collaborate with interdisciplinary team and initiate plan and interventions as ordered. Monitor patient's weight and dietary intake as ordered or per policy. Utilize nutrition screening tool and intervene as necessary. Determine patient's food preferences and provide high-protein, high-caloric foods as appropriate.      INTERVENTIONS:  - Monitor intake, urinary output, labs, and treatment plans  - Assess nutrition and hydration status and recommend course of action  - Recommend/ encourage appropriate nutritional supplements, and vitamin/mineral supplements  - Recommend, monitor, and adjust tube feedings based on assessed needs  - Assess need for intravenous fluids  - Provide specific nutrition/hydration education as appropriate  - Include patient/family/caregiver in decisions related to nutrition  Outcome: Progressing     Problem: INFECTION - ADULT  Goal: Absence or prevention of progression during hospitalization  Description: INTERVENTIONS:  - Assess and monitor for signs and symptoms of infection  - Monitor lab/diagnostic results  - Monitor all insertion sites, i.e. indwelling lines, tubes, and drains  - Monitor endotracheal if appropriate and nasal secretions for changes in amount and color  - [place] appropriate cooling/warming therapies per order  - Administer medications as ordered  - Instruct and encourage patient and family to use good hand hygiene technique  - Identify and instruct in appropriate isolation precautions for identified infection/condition  Outcome: Progressing  Goal: Absence of fever/infection during neutropenic period  Description: INTERVENTIONS:  - Monitor WBC    Outcome: Progressing     Problem: SAFETY ADULT  Goal: Patient will remain free of falls  Description: INTERVENTIONS:  - Educate patient/family on patient safety including physical limitations  - Instruct patient to call for assistance with activity   - Consult OT/PT to assist with strengthening/mobility   - Keep Call bell within reach  - Keep bed low and locked with side rails adjusted as appropriate  - Keep care items and personal belongings within reach  - Initiate and maintain comfort rounds  - Make Fall Risk Sign visible to staff  - Offer Toileting every  Hours, in advance of need  - Initiate/Maintain alarm  - Obtain necessary fall risk management equipment:   - Apply yellow socks and bracelet for high fall risk patients  - Consider moving patient to room near nurses station  Outcome: Progressing     Problem: DISCHARGE PLANNING  Goal: Discharge to home or other facility with appropriate resources  Description: INTERVENTIONS:  - Identify barriers to discharge w/patient and caregiver  - Arrange for needed discharge resources and transportation as appropriate  - Identify discharge learning needs (meds, wound care, etc.)  - Arrange for interpretive services to assist at discharge as needed  - Refer to Case Management Department for coordinating discharge planning if the patient needs post-hospital services based on physician/advanced practitioner order or complex needs related to functional status, cognitive ability, or social support system  Outcome: Progressing     Problem: Knowledge Deficit  Goal: Patient/family/caregiver demonstrates understanding of disease process, treatment plan, medications, and discharge instructions  Description: Complete learning assessment and assess knowledge base.   Interventions:  - Provide teaching at level of understanding  - Provide teaching via preferred learning methods  Outcome: Progressing     Problem: GENITOURINARY - ADULT  Goal: Maintains or returns to baseline urinary function  Description: INTERVENTIONS:  - Assess urinary function  - Encourage oral fluids to ensure adequate hydration if ordered  - Administer IV fluids as ordered to ensure adequate hydration  - Administer ordered medications as needed  - Offer frequent toileting  - Follow urinary retention protocol if ordered  Outcome: Progressing  Goal: Absence of urinary retention  Description: INTERVENTIONS:  - Assess patient’s ability to void and empty bladder  - Monitor I/O  - Bladder scan as needed  - Discuss with physician/AP medications to alleviate retention as needed  - Discuss catheterization for long term situations as appropriate  Outcome: Progressing  Goal: Urinary catheter remains patent  Description: INTERVENTIONS:  - Assess patency of urinary catheter  - If patient has a chronic vazquez, consider changing catheter if non-functioning  - Follow guidelines for intermittent irrigation of non-functioning urinary catheter  Outcome: Progressing     Problem: METABOLIC, FLUID AND ELECTROLYTES - ADULT  Goal: Glucose maintained within target range  Description: INTERVENTIONS:  - Monitor Blood Glucose as ordered  - Assess for signs and symptoms of hyperglycemia and hypoglycemia  - Administer ordered medications to maintain glucose within target range  - Assess nutritional intake and initiate nutrition service referral as needed  Outcome: Progressing     Problem: SKIN/TISSUE INTEGRITY - ADULT  Goal: Skin Integrity remains intact(Skin Breakdown Prevention)  Description: Assess:  -Perform Henry assessment every   -Clean and moisturize skin every   -Inspect skin when repositioning, toileting, and assisting with ADLS  -Assess under medical devices such as  every   -Assess extremities for adequate circulation and sensation     Bed Management:  -Have minimal linens on bed & keep smooth, unwrinkled  -Change linens as needed when moist or perspiring  -Avoid sitting or lying in one position for more than  hours while in bed  -Keep HOB at degrees     Toileting:  -Offer bedside commode  -Assess for incontinence every   -Use incontinent care products after each incontinent episode such as     Activity:  -Mobilize patient  times a day  -Encourage activity and walks on unit  -Encourage or provide ROM exercises   -Turn and reposition patient every  Hours  -Use appropriate equipment to lift or move patient in bed  -Instruct/ Assist with weight shifting every  when out of bed in chair  -Consider limitation of chair time  hour intervals    Skin Care:  -Avoid use of baby powder, tape, friction and shearing, hot water or constrictive clothing  -Relieve pressure over bony prominences using   -Do not massage red bony areas    Next Steps:  -Teach patient strategies to minimize risks such as    -Consider consults to  interdisciplinary teams such as   Outcome: Progressing  Goal: Pressure injury heals and does not worsen  Description: Interventions:  - Implement low air loss mattress or specialty surface (Criteria met)  - Apply silicone foam dressing  - Instruct/assist with weight shifting every  minutes when in chair   - Limit chair time to  hour intervals  - Use special pressure reducing interventions such as  when in chair   - Apply fecal or urinary incontinence containment device   - Perform passive or active ROM every   - Turn and reposition patient & offload bony prominences every  hours   - Utilize friction reducing device or surface for transfers   - Consider consults to  interdisciplinary teams such as   - Use incontinent care products after each incontinent episode such as   - Consider nutrition services referral as needed  Outcome: Progressing   2

## 2023-10-21 NOTE — PROGRESS NOTES
Daily Progress Note - [facility] Medicine Residency  Asael Harris 80 y.o. male MRN: [id number]  Unit/Bed#: [address] Encounter: [id number]  Admitting Physician: Oniel Oconnell DO   PCP: ROSITA Monae  Date of Admission:  10/16/2023 11:43 AM    Assessment and Plan    Abnormal urinalysis  Assessment & Plan  Pt is a 79 yo male w/dementia, is + UA for large leukocytes, large occult blood, small bilirubin, trace ketones and small bilirubin. On Admission, according to pt's daughter he has not complained about difficulty urinating but had decreased frequency. In the afternoon he would have increased residual. Bladder scan showed 25cc of urine. WBC 7.04, afebrile, no tachycardia, no tachypnea. 10/17:Pt has bloody urine and decreased urine output on 10/17. Post voidal scan showed 580ml, 22f Lieberman placed  Urine culture: Proteus mirabilis ESBL Abnormal     Continue to Monitor vital signs  Monitor I and Os  Nephrology consulted, recs appreciated  Discontinue Eliquis, due to hematuria  Discharge home with Lieberman,    Chronic deep vein thrombosis (DVT) of femoral vein of both lower extremities ([address])  Assessment & Plan  Pt has a hx of chronic DVTs w/IVC filter in place. During last admission he was found to have Acute right lower extremity DVT in EIV and CFV. Pt was started Eliquis loading dose of 5 mg BID for 7 days and than 2.5 mg BID. Pt developed hematuria after re-starting Eliquis 10/18. Discontinue Eliquis 2.5mg BID  Monitor signs for any new swelling on lower extremities    Stage 3 chronic kidney disease [facility])  Assessment & Plan  Lab Results   Component Value Date    EGFR 27 10/19/2023    EGFR 24 10/18/2023    EGFR 22 10/17/2023    CREATININE 2.12 (H) 10/19/2023    CREATININE 2.38 (H) 10/18/2023    CREATININE 2.49 (H) 10/17/2023     Pt has A hx of Stage 3 CKD, follows up with [facility] prior to the last admission.  His baseline Creatinine is around 2.0 and EFGR of 20.      Renal dose his medications  Avoid Nephrotoxic medications  Avoid hypotension  Trend Creatinine    Functional quadriplegia (HCC)  Assessment & Plan  Pt has a chronic hx of parkinson's and functional quadriplegia secondary to severe dementia and Parkinson's a/e/b bedbound. Patient requires assistance with all of his ADLs. Contractures to be treated with increased assistance and total care. He resides with daughter who provides complete care. Continue frequent repositioning   Check feeding tubes insertion site to make sure the patient is not pulling on that    Depression  Assessment & Plan  Chronic, stable  Continue home medications: Mirtazapine 7.5 mg daily at bedtime, Zoloft 100 mg per G-tube daily    Dry eyes  Assessment & Plan  Chronic, stable  Continue artificial tears 1 drop in each eye 3 times daily as needed    Gastroesophageal reflux disease  Assessment & Plan  Chronic, stable  Continue home medication Pepcid 20 mg every other day    Constipation  Assessment & Plan  Chronic, stable    Pt has some episodes of diarrhea yesterday (10/18)    Hold home meds MiraLAX, senna per G-tube    Chronic obstructive pulmonary disease (HCC)  Assessment & Plan  Chronic, stable    Patient has a past medical history of COPD, stable on his home medications  Continue Pulmicort 0.25 twice daily  Continue DuoNebs 3 times daily    Dysphagia  Assessment & Plan  On prior admission patient was unable to swallow his medications and had a risk for aspiration this was placed on G tube. Patient receives all of his enteric feeds and medications via the G-tube. Patient's creatinine is 2.34.      Check gastrotomy tube daily  Feeds consist of Glucerna 1.2, 75 ml/h continuous with water boluses every 4 hours with 120ml flush  Increase in water bolus due to creatinine trending up  Soft restraints, to avoid pulling out the G tube    Severe dementia [facility])  Assessment & Plan  Chronic,  Patient has a history of progressive dementia, secondary to Parkinson's disease. Soft restraints, to avoid pulling out the G tube and Lieberman        Hypertension  Assessment & Plan  Chronic, stable  BP 10/19: 130/59    Continue home medications Labetalol 200mg Q8  Continue amlodipine 10mg  Monitor blood pressure    PD (Parkinson's disease)  Assessment & Plan  Chronic, stable    Pt is minimally verbal, and will sometimes answer questions. Continue carbidopa levodopa  mg 3 times daily per G-tube  Continue lamotrigine 25 mg twice daily per G-tube    * Chest pain-resolved as of 10/20/2023  Assessment & Plan  Patient 80year-old with PMH of Parkinson's, dementia, minimally verbal. According to the daughter, patient had chest pain, chest tightness and heavy breathing since today morning. She gave him 1 treatment of albuterol and Pulmicort but it did not improve. Patient has no cough, but was a little short of breath when he presented to the ED. When the daughter asked him where the pain was he was pointing to her chest.  No other complaints by the daughter, ROS was unobtainable due to minimally verbal patient. In the ED, patient pointed epigastric region as a source of pain. LA: 2.1<1.6, Trops: (0)21;(2)18;(4)18; Hgb 10.3; Procal 0.09  EKG: Sinus rhythm w/sinus arrhythmia with 1st degree A-V block, Qtc 411  CT A/B: No acute abdominal or pelvic pathology within the aforementioned limitations. PEG tube in place with no associated fluid collections. No bowel obstruction or convincing inflammation. Normal appendix. Urinary bladder obscured by streak artifact from the bilateral hip arthroplasties. No hydronephrosis. Minimal groundglass opacities are seen in the upper lobe centrally, which may be infectious. Echo: EF 60%, mild to moderate concentric hypertrophy.  Normal systolic/diastolic heart function    Continue to Monitor vital signs  Start Azithromycin 500mg Q24  Continue Duo-nebs Q8  Continue Pulmicort 0.25 twice daily  Stop eliquis        VTE Pharmacologic Prophylaxis: VTE Score: 15 High Risk (Score >/= 5) - Pharmacological DVT Prophylaxis Contraindicated. Sequential Compression Devices Ordered. Patient Centered Rounds: I have performed bedside rounds with nursing staff today. Discussions with Specialists or Other Care Team Provider: Urology    Education and Discussions with Family / Patient:   Patient Information Sharing: With the consent of Alejandra Hyatt , their loved ones Elaina Cisse were notified today by inpatient team of the patient’s condition and current plan. All questions answered. Time Spent for Care: 30 minutes. More than 50% of total time spent on counseling and coordination of care as described above. Current Length of Stay: 4 day(s)    Current Patient Status: Inpatient   Certification Statement: The patient will continue to require additional inpatient hospital stay due to Abnormal UA    Discharge Plan: Home w/VNA services    Code Status: Level 3 - DNAR and DNI    Subjective:   Pt seen and examined at bedside. According, to the nurse pt had a loose bm last night. No other acute complaints overnight. Pt denies any pain. Lieberman draining yellow clear urine, no blood. Objective     Objective:   Vitals:   Temp (24hrs), Av.4 °F (36.9 °C), Min:97.5 °F (36.4 °C), Max:99.1 °F (37.3 °C)    Temp:  [97.5 °F (36.4 °C)-99.1 °F (37.3 °C)] 99.1 °F (37.3 °C)  HR:  [60-74] 74  Resp:  [14-18] 16  BP: (126-143)/() 126/63  SpO2:  [96 %-98 %] 97 %  Body mass index is 24.49 kg/m². Input and Output Summary (last 24 hours): Intake/Output Summary (Last 24 hours) at 10/20/2023 2004  Last data filed at 10/20/2023 1701  Gross per 24 hour   Intake 1260 ml   Output 1700 ml   Net -440 ml       Physical Exam:   Physical Exam  Constitutional:       Appearance: He is normal weight. Cardiovascular:      Rate and Rhythm: Normal rate and regular rhythm. Pulses: Normal pulses. Heart sounds: Normal heart sounds.    Pulmonary: Effort: Pulmonary effort is normal.      Breath sounds: Normal breath sounds. Abdominal:      General: Abdomen is flat. Bowel sounds are normal.      Comments: G tube in place   Genitourinary:     Comments: Lieberman in place  Musculoskeletal:      Right lower leg: No edema. Left lower leg: No edema. Comments: Funtional quadraplegia   Skin:     General: Skin is warm. Capillary Refill: Capillary refill takes less than 2 seconds. Coloration: Skin is not jaundiced. Neurological:      Mental Status: Mental status is at baseline. He is disoriented. Additional Data:     Labs:  Results from last 7 days   Lab Units 10/20/23  0512   WBC Thousand/uL 6.14   HEMOGLOBIN g/dL 8.5*   HEMATOCRIT % 27.5*   PLATELETS Thousands/uL 248   NEUTROS PCT % 56   LYMPHS PCT % 28   MONOS PCT % 8   EOS PCT % 7*     Results from last 7 days   Lab Units 10/20/23  0512   POTASSIUM mmol/L 4.4   CHLORIDE mmol/L 108   CO2 mmol/L 24   BUN mg/dL 36*   CREATININE mg/dL 1.88*   CALCIUM mg/dL 8.5   ALK PHOS U/L 44   ALT U/L 3*   AST U/L 12*                   * I Have Reviewed All Lab Data Listed Above. * Additional Pertinent Lab Tests Reviewed:  [facility] Admission Reviewed    Imaging:    Imaging Reports Reviewed Today Include: yes  Imaging Personally Reviewed by Myself Includes:  yes    Recent Cultures (last 7 days):     Results from last 7 days   Lab Units 10/16/23  1610   URINE CULTURE  50,000-59,000 cfu/ml Proteus mirabilis ESBL*       Last 24 Hours Medication List:   Current Facility-Administered Medications   Medication Dose Route Frequency Provider Last Rate    acetaminophen  1,000 mg Intravenous Q8H PRN Alfredo Waldrop MD      amLODIPine  10 mg Per G Tube Daily Alfredo Waldrop MD      ascorbic acid  250 mg Per G Tube Daily Alfredo Waldrop MD      budesonide  0.25 mg Nebulization BID Alfredo Waldrop MD      carbidopa-levodopa  1 tablet Per G Tube TID Inez Cedeño MD      famotidine  20 mg Per G Tube Every Other Vanessa Clemons MD      folic acid  1 mg Per G Tube Daily Eunice Brown MD      glycerin-hypromellose-  1 drop Both Eyes Q6H PRN Derick Sun MD      ipratropium-albuterol  3 mL Nebulization TID Eunice Brown MD      labetalol  200 mg Per [place] MD Jose      lamoTRIgine  25 mg Per G Tube BID Eunice Brown MD      latanoprost  1 drop Both Eyes HS Eunice Brown MD      mirtazapine  15 mg Per G Tube HS Derick Sun MD      nystatin   Topical TID Eunice Brown MD      oxybutynin  5 mg Per G Tube TID Derick Sun MD      saccharomyces boulardii  250 mg Per G Tube BID Eunice Brown MD      sertraline  100 mg Per G Tube Daily Eunice Brown MD               ** Please Note: Dictation voice to text software may have been used in the creation of this document.  Eunice Brown MD  10/20/23  8:04 PM

## 2023-10-23 ENCOUNTER — PATIENT OUTREACH (OUTPATIENT)
Dept: FAMILY MEDICINE CLINIC | Facility: CLINIC | Age: 83
End: 2023-10-23

## 2023-10-23 NOTE — PROGRESS NOTES
Outreach to patients daughter. Discussed the following:   S/W intake from [facility], Has not received a phone call as to when an RN will be doing home visit. Tuesday Monica BECKER will be doing home visit tomorrow. Daughter will discuss hospice as an option after home visit with Tuesday. [facility] is aware that hospice is being considered. [facility]. Outreach to [facility]. RADHA requesting return call.

## 2023-10-24 ENCOUNTER — PATIENT OUTREACH (OUTPATIENT)
Dept: FAMILY MEDICINE CLINIC | Facility: CLINIC | Age: 83
End: 2023-10-24

## 2023-10-24 ENCOUNTER — IN HOME VISIT (OUTPATIENT)
Dept: FAMILY MEDICINE CLINIC | Facility: CLINIC | Age: 83
End: 2023-10-24
Payer: MEDICARE

## 2023-10-24 DIAGNOSIS — Z51.5 END OF LIFE CARE: Primary | ICD-10-CM

## 2023-10-24 DIAGNOSIS — N39.0 URINARY TRACT INFECTION ASSOCIATED WITH INDWELLING URETHRAL CATHETER, INITIAL ENCOUNTER: ICD-10-CM

## 2023-10-24 DIAGNOSIS — L98.421 SKIN ULCER OF SACRUM, LIMITED TO BREAKDOWN OF SKIN (HCC): ICD-10-CM

## 2023-10-24 DIAGNOSIS — T83.511D URINARY TRACT INFECTION ASSOCIATED WITH INDWELLING URETHRAL CATHETER, SUBSEQUENT ENCOUNTER: ICD-10-CM

## 2023-10-24 DIAGNOSIS — C50.122 MALIGNANT NEOPLASM OF CENTRAL PORTION OF LEFT BREAST IN MALE, ESTROGEN RECEPTOR POSITIVE: ICD-10-CM

## 2023-10-24 DIAGNOSIS — N18.30 STAGE 3 CHRONIC KIDNEY DISEASE, UNSPECIFIED WHETHER STAGE 3A OR 3B CKD (HCC): ICD-10-CM

## 2023-10-24 DIAGNOSIS — N39.0 URINARY TRACT INFECTION ASSOCIATED WITH INDWELLING URETHRAL CATHETER, SUBSEQUENT ENCOUNTER: ICD-10-CM

## 2023-10-24 DIAGNOSIS — T83.511A URINARY TRACT INFECTION ASSOCIATED WITH INDWELLING URETHRAL CATHETER, INITIAL ENCOUNTER: ICD-10-CM

## 2023-10-24 DIAGNOSIS — J44.9 CHRONIC OBSTRUCTIVE PULMONARY DISEASE, UNSPECIFIED COPD TYPE (HCC): ICD-10-CM

## 2023-10-24 DIAGNOSIS — I82.513 CHRONIC DEEP VEIN THROMBOSIS (DVT) OF FEMORAL VEIN OF BOTH LOWER EXTREMITIES (HCC): ICD-10-CM

## 2023-10-24 DIAGNOSIS — B36.9 FUNGAL RASH OF TORSO: ICD-10-CM

## 2023-10-24 DIAGNOSIS — F03.C0 SEVERE DEMENTIA, UNSPECIFIED DEMENTIA TYPE, UNSPECIFIED WHETHER BEHAVIORAL, PSYCHOTIC, OR MOOD DISTURBANCE OR ANXIETY (HCC): ICD-10-CM

## 2023-10-24 DIAGNOSIS — N39.0 CHRONIC UTI: ICD-10-CM

## 2023-10-24 DIAGNOSIS — E11.9 TYPE 2 DIABETES MELLITUS WITHOUT COMPLICATION, WITHOUT LONG-TERM CURRENT USE OF INSULIN (HCC): ICD-10-CM

## 2023-10-24 DIAGNOSIS — G20.A1 PARKINSON'S DISEASE, UNSPECIFIED WHETHER DYSKINESIA PRESENT, UNSPECIFIED WHETHER MANIFESTATIONS FLUCTUATE: ICD-10-CM

## 2023-10-24 DIAGNOSIS — C67.2 CARCINOMA OF LATERAL WALL OF URINARY BLADDER (HCC): Primary | ICD-10-CM

## 2023-10-24 DIAGNOSIS — Z17.0 MALIGNANT NEOPLASM OF CENTRAL PORTION OF LEFT BREAST IN MALE, ESTROGEN RECEPTOR POSITIVE: ICD-10-CM

## 2023-10-24 PROCEDURE — 99349 HOME/RES VST EST MOD MDM 40: CPT | Performed by: NURSE PRACTITIONER

## 2023-10-24 NOTE — PROGRESS NOTES
Received call from Tuesday Monica BECKER. Tuesday calling during home visit. Daughter is agreeable to start hospice services. Agency of choice is [facility]. RN KATE will reach out to [facility] and present case. S/W Gay Lebron Intake at [facility]. Case presented. Referral placed in Epic. Gay Lebron will reach out to patients daughter. Outreach to patients daughter. Advised of above. Advised  to expect a phone call from [facility]

## 2023-10-25 ENCOUNTER — PATIENT OUTREACH (OUTPATIENT)
Dept: FAMILY MEDICINE CLINIC | Facility: CLINIC | Age: 83
End: 2023-10-25

## 2023-10-25 ENCOUNTER — TELEPHONE (OUTPATIENT)
Dept: FAMILY MEDICINE CLINIC | Facility: CLINIC | Age: 83
End: 2023-10-25

## 2023-10-25 VITALS
DIASTOLIC BLOOD PRESSURE: 50 MMHG | HEART RATE: 60 BPM | SYSTOLIC BLOOD PRESSURE: 118 MMHG | TEMPERATURE: 97.6 F | OXYGEN SATURATION: 98 %

## 2023-10-25 NOTE — PROGRESS NOTES
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 ([address])  Hospice referral    Skin ulcer of sacrum, limited to breakdown of skin ([address])  Wound care    Parkinson's disease, unspecified whether dyskinesia present, unspecified whether manifestations fluctuate    Chronic deep vein thrombosis (DVT) of femoral vein of both lower extremities (HCC)  No elequis    Chronic obstructive pulmonary disease, unspecified COPD type ([address])    Type 2 diabetes mellitus without complication, without long-term current use of insulin ([address])    Stage 3 chronic kidney disease, unspecified whether stage 3a or 3b CKD ([address])    Urinary tract infection associated with indwelling urethral catheter, subsequent encounter    Chronic UTI    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 male. Patient seen today at home following discharge from hospital.  He is in bed with soft restraints to both arms to prevent him from pulling out his gastrostomy tube. I sat next to Jordin Bhat and asked him questions which he answers coherently and concisely. He answered questions regarding the gastrostomy tube and stated he does not want it even if it means he will die. He stated he knows he will die with out tube feedings and fluids. He stated he does not want to live the way he is living now. He stated he is happy to be with his daughter but not this way. We discussed hospice and that he will die and he is agreeable. His daughter stated she has his living will in which he did not want a feeding tube. Referral to be made for hospice. Daughter prefers agency that specializes in hospice. Names of agencies given to daughter. Jordin Bhat has history of prostate and breast Ca  and B/L DVT, as well as advanced PD with functional quad.   He is bed bound. He is diagnosed with advanced dementia but can talk and answer questions clearly. He has no orientation to time because of lack of exposure but is oriented to person and where he is. He has stage 2 sacral wounds a cluster-I taught his daughter how to cleanse and dress the wound. His urine has sediment but no hematuria. It is hopeful only that VN can change his vazquez due to damage from the radical prostatectomy. Urology wants to change it in office monthly. Brendan Mirza has not been OOB so getting him to Md office virtually impossible. Chad Merritt RN in touch with Clara Kramer patient daughter for hospice. > 45 minutes spent with patient on detailed history, physical exam and assessment, planning and diagnosing and hospice discussion, and wound care. The following portions of the patient's history were reviewed and updated as appropriate: allergies, current medications, past family history, past medical history, past social history, past surgical history, and problem list.    Review of Systems   Constitutional: Negative. HENT: Negative. Eyes: Negative. Respiratory: Negative. Cardiovascular: Negative. Negative for chest pain. Patient denies chestpain   Gastrointestinal:         Brendan Mirza stated he does not like the tube in his stomach   Endocrine: Negative. Genitourinary: Negative. Musculoskeletal: Negative. Skin: Negative. Allergic/Immunologic: Negative. Neurological: Negative. Hematological: Negative. Psychiatric/Behavioral: Negative. Objective:      /50   Pulse 60   Temp 97.6 °F (36.4 °C)   SpO2 98%          Physical Exam  Constitutional:       General: He is not in acute distress. Appearance: He is not toxic-appearing or diaphoretic. HENT:      Head: Normocephalic and atraumatic. Right Ear: External ear normal.      Left Ear: External ear normal.      Nose: Nose normal. No congestion.       Mouth/Throat:      Mouth: Mucous membranes are dry. Pharynx: Oropharynx is clear. Comments: Tongue dry and scaley  Eyes:      General:         Right eye: No discharge. Left eye: No discharge. Conjunctiva/sclera: Conjunctivae normal.   Neck:      Comments: Neck stiff due to limited mobility  Cardiovascular:      Rate and Rhythm: Normal rate and regular rhythm. Pulses: no weak pulses          Dorsalis pedis pulses are 2+ on the right side and 2+ on the left side. Heart sounds: Normal heart sounds. No murmur heard. Pulmonary:      Effort: Pulmonary effort is normal. No respiratory distress. Breath sounds: Normal breath sounds. No wheezing, rhonchi or rales. Abdominal:      General: Abdomen is flat. Bowel sounds are normal.      Palpations: Abdomen is soft. Tenderness: There is no abdominal tenderness. There is no guarding. Comments: Stool soft   Genitourinary:     Penis: Normal.       Comments: Urine vazquez with sediment-no hematuria  Musculoskeletal:         General: Normal range of motion. Cervical back: Normal range of motion. Rigidity present. Right lower leg: No edema. Left lower leg: No edema. Feet:      Right foot:      Skin integrity: No ulcer, skin breakdown, erythema, warmth, callus or dry skin. Left foot:      Skin integrity: No ulcer, skin breakdown, erythema, warmth, callus or dry skin. Skin:     General: Skin is warm and dry. Findings: Lesion present. Comments: Sacral cluster of stage 2 wounds   Neurological:      Mental Status: He is alert. Motor: Weakness present. Gait: Gait abnormal.      Comments: Oriented to self-daughter-living at daughters-medical condition  Bed bound due to PD     Psychiatric:         Mood and Affect: Mood normal.         Diabetic Foot Exam    Patient's shoes and socks removed. Right Foot/Ankle   Right Foot Inspection  Skin Exam: skin normal and skin intact.  No dry skin, no warmth, no callus, no erythema, no maceration, no abnormal color, no pre-ulcer, no ulcer and no callus. Toe Exam: No swelling, no tenderness, erythema and  no right toe deformity    Sensory   Monofilament testing: diminished    Vascular  The right DP pulse is 2+. Left Foot/Ankle  Left Foot Inspection  Skin Exam: skin normal and skin intact. No dry skin, no warmth, no erythema, no maceration, normal color, no pre-ulcer, no ulcer and no callus. Toe Exam: No swelling, no tenderness, no erythema and no left toe deformity. Sensory   Monofilament testing: diminished    Vascular  The left DP pulse is 2+.      Assign Risk Category  No deformity present  Loss of protective sensation  No weak pulses  Risk: 1

## 2023-10-25 NOTE — PROGRESS NOTES
Outreach to [employer]. S/W Jessika. Confirmed SOC for hospice. RN will be starting services today. Tuesday Monica BECKER will continue to manage patients care. RN CM will continue to follow.

## 2023-10-25 NOTE — TELEPHONE ENCOUNTER
Vm on clinical line:     Hi, my name is Maria G Thurman. I'm calling from [facility], Hospice in reference to Maryann Ramos. Date of birth 1940. The daughter would like to initiate Hospice services today. We have a nurse going out. I need to know who has the collaborative agreement this Tuesday. Florian, the nurse practitioner. The family does want to keep her, but unfortunately with Medicare, we can't use the nurse practitioner to sign off on the certificate of terminal illness. If somebody could just give me a call back at [phone] so we can discuss the case. Thank you so much. Routing to Tuesday & Ragini Lopez for assistance.

## 2023-10-27 ENCOUNTER — TELEPHONE (OUTPATIENT)
Dept: FAMILY MEDICINE CLINIC | Facility: CLINIC | Age: 83
End: 2023-10-27

## 2023-10-27 NOTE — TELEPHONE ENCOUNTER
[facility] [place] [address] [place] [address]  [id number]  Scanned into encounter  Placed in PCP's folder  CWI:[phone]

## 2023-10-30 ENCOUNTER — TELEPHONE (OUTPATIENT)
Dept: FAMILY MEDICINE CLINIC | Facility: CLINIC | Age: 83
End: 2023-10-30

## 2023-10-31 ENCOUNTER — TELEPHONE (OUTPATIENT)
Dept: FAMILY MEDICINE CLINIC | Facility: CLINIC | Age: 83
End: 2023-10-31

## 2023-10-31 NOTE — TELEPHONE ENCOUNTER
CHARLES on provider line:    Hi, my name is [employer]. I'm an RN with [facility]. I'm calling regarding a mutual patient by the name of Virajfreddie High. A Steven's last name is Harsh Carroll. His YOB: 1940. That's 1940. He started here with us on the 25th of October last week. He is a patient of Doctor Art Guzmán. He has a fully catheter that was in place before he came on to service. It was just put in. It's got quite a bit of sediment but there's a lot of nucleus and drainage and exudate coming from the tip of the penis is running a low grade temp of 100.4 on times two and the last 12 hours. My call back number is [phone] or you can reach our office at [phone] and ask to speak to [facility]. We're just the daughter is wondering if Doctor Pola Brody would order an antibiotic for him. He's not allergic to any antibiotics. The only thing I see him allergic to is lactose. Again, my number is [phone]. All we need is the order for Medicaid, the antibiotic that we can call it into our pharmacy under Doctor Gopal's name so it can be delivered to the patient or we can get a local fill. OK. Thank you so much. I appreciate your call back. Bye, bye. Tuesday - are you able to order antibiotics?

## 2023-11-01 ENCOUNTER — TELEPHONE (OUTPATIENT)
Dept: FAMILY MEDICINE CLINIC | Facility: CLINIC | Age: 83
End: 2023-11-01

## 2023-11-01 NOTE — PROGRESS NOTES
Spoke to [place] from hospice. She will restart zoloft. Explained that patient expressed to me he does not want tube feeding. ABT ordered for probable UTI. Augmentin ordered for patient.

## 2023-11-03 ENCOUNTER — TELEPHONE (OUTPATIENT)
Dept: FAMILY MEDICINE CLINIC | Facility: CLINIC | Age: 83
End: 2023-11-03

## 2023-11-03 NOTE — TELEPHONE ENCOUNTER
[facility]  Order date 10/13 - PEG TUBE CARE  Order date 10/11 - HOME MEDICATION RECONCILIATION  Scanned into encounter  Placed in pcp's folder  Fax: [phone]

## 2023-11-08 NOTE — TELEPHONE ENCOUNTER
Fax received from [facility]. Faxed was signed and faxed back and placed in to be scanned bin.

## 2023-11-13 ENCOUNTER — TELEPHONE (OUTPATIENT)
Dept: FAMILY MEDICINE CLINIC | Facility: CLINIC | Age: 83
End: 2023-11-13

## 2023-11-13 ENCOUNTER — PATIENT OUTREACH (OUTPATIENT)
Dept: FAMILY MEDICINE CLINIC | Facility: CLINIC | Age: 83
End: 2023-11-13

## 2023-11-13 DIAGNOSIS — G89.4 CHRONIC PAIN SYNDROME: ICD-10-CM

## 2023-11-13 DIAGNOSIS — G20.A1 PARKINSON'S DISEASE, UNSPECIFIED WHETHER DYSKINESIA PRESENT, UNSPECIFIED WHETHER MANIFESTATIONS FLUCTUATE: ICD-10-CM

## 2023-11-13 DIAGNOSIS — L89.219 PRESSURE INJURY OF SKIN OF RIGHT HIP, UNSPECIFIED INJURY STAGE: Primary | ICD-10-CM

## 2023-11-13 DIAGNOSIS — L89.216 PRESSURE INJURY OF DEEP TISSUE OF RIGHT HIP: ICD-10-CM

## 2023-11-13 DIAGNOSIS — L98.421 SKIN ULCER OF SACRUM, LIMITED TO BREAKDOWN OF SKIN (HCC): ICD-10-CM

## 2023-11-13 DIAGNOSIS — N18.30 STAGE 3 CHRONIC KIDNEY DISEASE, UNSPECIFIED WHETHER STAGE 3A OR 3B CKD (HCC): ICD-10-CM

## 2023-11-13 RX ORDER — MORPHINE SULFATE 100 MG/5ML
10 SOLUTION, CONCENTRATE ORAL
Qty: 30 ML | Refills: 0 | Status: SHIPPED | OUTPATIENT
Start: 2023-11-13 | End: 2023-11-15 | Stop reason: CLARIF

## 2023-11-13 NOTE — PROGRESS NOTES
Received a call from Osmin King from [address] [place] [address] about this patient that patient is terminal known case of severe Parkinson's disease and other comorbidities also has a sacral decubitus pressure ulcer patient is in significant pain. They are not able to reach patient's primary care physician. Requesting morphine. 32 pages paper given to them were reviewed. Per the request as a medical director for hospice I will order morphine sulfate 10 mg equal to 0.5 mL every 3 hours as needed as a emergency dispensing 2P    Pharmacy.

## 2023-11-13 NOTE — TELEPHONE ENCOUNTER
Dr. Loida Mcfarlane or any preceptor     We need to prescribe morphina  concentrate 20 mg/ml to give 0.5/ml (10 mg every three hours ans needed for Pain(   Need E-prescribe  [facility] [place]      He is in the lot of pain the wound progressive.   Family will  asap pharmacy  close 7 pm.    STAF      Nurse from [facility]

## 2023-11-13 NOTE — PROGRESS NOTES
Outreach to patients daughter to discuss status. LVM for daughter requesting return call. RN CM contact information provided. Outreach to Tuesday [facility] ROSITA. Advised that patient does remain on hospice through [address] [facility]. RN CM will close episode at this time as patient is being followed by hospice. Will reopen if new referral is received.

## 2023-11-14 ENCOUNTER — TELEPHONE (OUTPATIENT)
Dept: FAMILY MEDICINE CLINIC | Facility: CLINIC | Age: 83
End: 2023-11-14

## 2023-11-14 NOTE — TELEPHONE ENCOUNTER
Completed form has been faxed to the number listed below copy has been attached to this encounter. [phone]        I do not see an encounter for this form.

## 2023-11-14 NOTE — TELEPHONE ENCOUNTER
Message left on Clinical Line-     This is Melinda Garcia calling from [employer]. I'm calling in reference to Juan Manuel Ellis. You can call me back to get the message at [phone]. And again, it's in reference to Juan Manuel Ellis, patient of Doctor Art Guzmán. And my return call number is [phone]. Thank you. You received a voice mail from [email or web address]. This message has been addressed; Returning call to Melinda Garcia at [employer]. She is calling to report that patient passed away last night. Death certificate may be singed online, Case ID number is listed below;     Case RM#[id number]

## 2023-11-16 ENCOUNTER — TELEPHONE (OUTPATIENT)
Dept: FAMILY MEDICINE CLINIC | Facility: CLINIC | Age: 83
End: 2023-11-16

## 2023-11-16 NOTE — TELEPHONE ENCOUNTER
Fax received from [facility]. PT has passed but form is for services rendered prior to passing. Placed in Tuesdays folder in precepting room for completion.

## 2023-11-20 NOTE — TELEPHONE ENCOUNTER
Matt on provider line:     Hi, my name is Kayla Calzada. I'm calling from the [facility] [address], and I'm following up hopefully with Luis A Clarke. She and I've spoken a couple of times. We're trying to get back in order signed by [place] Tuesday, [place] and the shared patient is Eliza Rosenberg birthday 1940. The order number is [id number] to early October. We just we needed to sign back by Tuesday Tuesday this week. Hopefully fax number [phone]. My number is [phone]. Thank you. Tuesday- can you fill out this form? It should be in your folder.

## 2023-12-14 ENCOUNTER — TELEPHONE (OUTPATIENT)
Dept: FAMILY MEDICINE CLINIC | Facility: CLINIC | Age: 83
End: 2023-12-14

## (undated) DEVICE — CATH URETERAL 5FR X 70 CM FLEX TIP POLYUR BARD

## (undated) DEVICE — ELECTRODE,CUTTING,STERILE.24FR: Brand: N.A.

## (undated) DEVICE — SPONGE STICK WITH PVP-I: Brand: KENDALL

## (undated) DEVICE — GLOVE SRG BIOGEL 8

## (undated) DEVICE — POUCH UR CATCHER STERILE

## (undated) DEVICE — GROUNDING PAD UNIVERSAL SLW

## (undated) DEVICE — CYSTOSCOPY PACK: Brand: CONVERTORS

## (undated) DEVICE — STANDARD SURGICAL GOWN, L: Brand: CONVERTORS

## (undated) DEVICE — EVACUATOR BLADDER ELLIK DISP STRL

## (undated) DEVICE — CYSTO TUBING TUR Y IRRIGATION

## (undated) DEVICE — STERILE SURGICAL LUBRICANT,  TUBE: Brand: SURGILUBE